# Patient Record
Sex: FEMALE | Race: WHITE | NOT HISPANIC OR LATINO | Employment: OTHER | URBAN - METROPOLITAN AREA
[De-identification: names, ages, dates, MRNs, and addresses within clinical notes are randomized per-mention and may not be internally consistent; named-entity substitution may affect disease eponyms.]

---

## 2017-01-04 ENCOUNTER — TELEPHONE (OUTPATIENT)
Dept: UROLOGY | Facility: CLINIC | Age: 82
End: 2017-01-04

## 2017-01-04 NOTE — TELEPHONE ENCOUNTER
I left a message on the pt's voicemail to please call us back so that an appointment can be scheduled. This appointment would be a f/u from [facility] and in response to the referral that was sent by Dr Christian on 12/30/16. I also called [facility] and requested the records from the consult on 12/19/16.The records are in the folder.

## 2017-12-05 ENCOUNTER — OFFICE VISIT (OUTPATIENT)
Dept: SURGERY | Facility: CLINIC | Age: 82
End: 2017-12-05
Payer: MEDICARE

## 2017-12-05 VITALS
WEIGHT: 229.94 LBS | DIASTOLIC BLOOD PRESSURE: 88 MMHG | SYSTOLIC BLOOD PRESSURE: 155 MMHG | HEIGHT: 61 IN | BODY MASS INDEX: 43.41 KG/M2

## 2017-12-05 DIAGNOSIS — K57.20 DIVERTICULITIS OF LARGE INTESTINE WITH ABSCESS WITHOUT BLEEDING: Primary | ICD-10-CM

## 2017-12-05 PROCEDURE — 99213 OFFICE O/P EST LOW 20 MIN: CPT | Mod: ,,, | Performed by: SURGERY

## 2017-12-05 RX ORDER — GLUCOSAM/CHON-MSM1/C/MANG/BOSW 500-416.6
1 TABLET ORAL 2 TIMES DAILY
COMMUNITY
Start: 2017-10-24

## 2017-12-05 RX ORDER — SIMVASTATIN 20 MG/1
20 TABLET, FILM COATED ORAL
COMMUNITY
End: 2017-12-05 | Stop reason: SDUPTHER

## 2017-12-05 RX ORDER — ALPRAZOLAM 0.25 MG/1
0.25 TABLET ORAL DAILY
Status: ON HOLD | COMMUNITY
Start: 2017-02-10 | End: 2023-11-20 | Stop reason: HOSPADM

## 2017-12-05 RX ORDER — TALC
400 POWDER (GRAM) TOPICAL DAILY
COMMUNITY

## 2017-12-05 RX ORDER — DIGOXIN 250 MCG
125 TABLET ORAL DAILY
COMMUNITY
Start: 2017-11-11 | End: 2020-09-04

## 2017-12-05 RX ORDER — TRAZODONE HYDROCHLORIDE 100 MG/1
50 TABLET ORAL NIGHTLY
Status: ON HOLD | COMMUNITY
End: 2023-11-20 | Stop reason: HOSPADM

## 2017-12-05 RX ORDER — INSULIN ASPART 100 [IU]/ML
20 INJECTION, SUSPENSION SUBCUTANEOUS
COMMUNITY
End: 2017-12-05 | Stop reason: SDUPTHER

## 2017-12-05 RX ORDER — HYDROCODONE BITARTRATE AND ACETAMINOPHEN 5; 325 MG/1; MG/1
TABLET ORAL
COMMUNITY
Start: 2017-11-20 | End: 2018-03-23

## 2017-12-05 RX ORDER — BLOOD-GLUCOSE METER
EACH MISCELLANEOUS
COMMUNITY
Start: 2017-09-05

## 2017-12-05 RX ORDER — PANTOPRAZOLE SODIUM 20 MG/1
20 TABLET, DELAYED RELEASE ORAL DAILY
COMMUNITY

## 2017-12-05 RX ORDER — LEVOTHYROXINE SODIUM 175 UG/1
175 TABLET ORAL
COMMUNITY
End: 2017-12-05 | Stop reason: SDUPTHER

## 2017-12-05 RX ORDER — LANCETS 28 GAUGE
1 EACH MISCELLANEOUS 2 TIMES DAILY
COMMUNITY
Start: 2017-09-27

## 2017-12-05 RX ORDER — ONDANSETRON HYDROCHLORIDE 8 MG/1
TABLET, FILM COATED ORAL
COMMUNITY
End: 2017-12-05 | Stop reason: SDUPTHER

## 2017-12-05 RX ORDER — ALBUTEROL SULFATE 90 UG/1
AEROSOL, METERED RESPIRATORY (INHALATION)
COMMUNITY
End: 2017-12-05 | Stop reason: SDUPTHER

## 2017-12-05 RX ORDER — FUROSEMIDE 40 MG/1
40 TABLET ORAL
COMMUNITY
Start: 2017-10-31 | End: 2017-12-05 | Stop reason: SDUPTHER

## 2017-12-05 RX ORDER — ASPIRIN 81 MG/1
81 TABLET ORAL
COMMUNITY
End: 2017-12-05 | Stop reason: SDUPTHER

## 2017-12-05 RX ORDER — METOPROLOL SUCCINATE 100 MG/1
100 TABLET, EXTENDED RELEASE ORAL
COMMUNITY
End: 2017-12-05 | Stop reason: SDUPTHER

## 2017-12-05 RX ORDER — INSULIN ASPART 100 [IU]/ML
INJECTION, SUSPENSION SUBCUTANEOUS 2 TIMES DAILY
COMMUNITY
Start: 2017-10-21

## 2017-12-05 RX ORDER — ERTAPENEM SODIUM 1 G/1
INJECTION, POWDER, LYOPHILIZED, FOR SOLUTION INTRAMUSCULAR; INTRAVENOUS
COMMUNITY
Start: 2017-11-27 | End: 2018-03-19

## 2017-12-05 RX ORDER — MULTIVIT WITH IRON,MINERALS
3 TABLET ORAL 2 TIMES DAILY
COMMUNITY

## 2017-12-05 RX ORDER — BENAZEPRIL HYDROCHLORIDE 10 MG/1
10 TABLET ORAL
COMMUNITY
End: 2017-12-05 | Stop reason: SDUPTHER

## 2017-12-05 RX ORDER — MONTELUKAST SODIUM 5 MG/1
10 TABLET, CHEWABLE ORAL
COMMUNITY
End: 2018-03-19

## 2017-12-05 RX ORDER — ESCITALOPRAM OXALATE 20 MG/1
20 TABLET ORAL DAILY
Status: ON HOLD | COMMUNITY
End: 2018-02-23

## 2017-12-05 RX ORDER — IPRATROPIUM BROMIDE AND ALBUTEROL SULFATE 2.5; .5 MG/3ML; MG/3ML
SOLUTION RESPIRATORY (INHALATION)
COMMUNITY
End: 2017-12-05 | Stop reason: SDUPTHER

## 2017-12-05 RX ORDER — LEVOTHYROXINE SODIUM 125 UG/1
125 TABLET ORAL
COMMUNITY
Start: 2017-10-12 | End: 2020-12-17

## 2017-12-05 NOTE — PROGRESS NOTES
Subjective:       Patient ID: Nu Lee is a 82 y.o. female.    Chief Complaint: Other (Follow Up Diverticulitis)      HPI:  Patient presents for follow up.  She was admitted with acute sigmoid diverticulitis with 2cm abscess.  She was started on IV antibiotics.  Her symptoms resolved after several days in the hospital.  She was discharged on home IV antibiotics via PICC line.  She is feeling well.  She has no complaints this morning.  She has been afebrile.  She is tolerating diet.  Her last colonoscopy was three years ago.        Past Medical History:   Diagnosis Date    Arthritis     Coronary artery disease     Diverticulitis     GERD (gastroesophageal reflux disease)     Hyperlipidemia     Hypertension      Past Surgical History:   Procedure Laterality Date    CARDIAC PACEMAKER PLACEMENT  2007    CHOLECYSTECTOMY      CORONARY ARTERY BYPASS GRAFT      HYSTERECTOMY      TOTAL KNEE ARTHROPLASTY Right     TOTAL KNEE ARTHROPLASTY Left      Review of patient's allergies indicates:  No Known Allergies  Medication List with Changes/Refills   Current Medications    ALBUTEROL (VENTOLIN HFA) 90 MCG/ACTUATION INHALER    Inhale into the lungs.    ALPRAZOLAM (XANAX) 0.25 MG TABLET        APIXABAN 2.5 MG TAB    Take by mouth.    ASPIRIN (ECOTRIN) 81 MG EC TABLET    Take 81 mg by mouth once daily.    BENAZEPRIL (LOTENSIN) 10 MG TABLET    Take 10 mg by mouth.    CONTOUR TEST STRIPS STRP    USE TO TEST BLOOD SUGAR BID    CYANOCOBALAMIN 500 MCG TABLET    Take 1 tablet by mouth.    DIGOXIN (LANOXIN) 250 MCG TABLET        DOCUSATE SODIUM (COLACE) 50 MG CAPSULE    Take by mouth 2 (two) times daily.    ESCITALOPRAM OXALATE (LEXAPRO) 20 MG TABLET    Take 20 mg by mouth.    FUROSEMIDE (LASIX) 40 MG TABLET    Take 40 mg by mouth.    HYDROCODONE-ACETAMINOPHEN 5-325MG (NORCO) 5-325 MG PER TABLET        INVANZ 1 GRAM INJECTION        IPRATROPIUM-ALBUTEROL (COMBIVENT)  MCG/ACTUATION INHALER    Inhale into  "the lungs.    LEVOTHYROXINE (SYNTHROID) 125 MCG TABLET        MAGNESIUM OXIDE (MAG-OX) 250 MG TAB    Take 400 mg by mouth.    METOPROLOL SUCCINATE (TOPROL-XL) 100 MG 24 HR TABLET    Take 100 mg by mouth.    MONTELUKAST (SINGULAIR) 5 MG CHEWABLE TABLET    Take 10 mg by mouth.    NOVOLOG MIX 70-30 FLEXPEN 100 UNIT/ML (70-30) INPN PEN        ONDANSETRON (ZOFRAN) 8 MG TABLET    Take by mouth every 8 (eight) hours as needed for Nausea.    PANTOPRAZOLE (PROTONIX) 20 MG TABLET    Take 20 mg by mouth.    POTASSIUM GLUCONATE 2.5 MEQ TAB    Take 1 tablet by mouth.    SIMVASTATIN (ZOCOR) 20 MG TABLET    Take 20 mg by mouth.    TRAZODONE (DESYREL) 100 MG TABLET    Take 50 mg by mouth.    TRUE METRIX GLUCOSE METER MISC        TRUEPLUS LANCETS 28 GAUGE MISC        TRUEPLUS PEN NEEDLE 31 GAUGE X 1/4" NDLE       Discontinued Medications    ALBUTEROL 90 MCG/ACTUATION INHALER    Inhale into the lungs.    ALBUTEROL-IPRATROPIUM 2.5MG-0.5MG/3ML (DUO-NEB) 0.5 MG-3 MG(2.5 MG BASE)/3 ML NEBULIZER SOLUTION    Inhale into the lungs.    ASPIRIN (ECOTRIN) 81 MG EC TABLET    Take 81 mg by mouth.    BENAZEPRIL (LOTENSIN) 10 MG TABLET    Take 10 mg by mouth.    BLOOD SUGAR DIAGNOSTIC (BLOOD GLUCOSE TEST) STRP    USE TO TEST BLOOD SUGAR BID    DOCUSATE SODIUM (COLACE) 50 MG CAPSULE    Take by mouth.    FUROSEMIDE (LASIX) 40 MG TABLET    Take 40 mg by mouth.    INSULIN ASP PRT-INSULIN ASPART, NOVOLOG 70/30, (NOVOLOG MIX 70-30) 100 UNIT/ML (70-30) SOLN    Inject 20 Units into the skin.    LEVOTHYROXINE (SYNTHROID, LEVOTHROID) 175 MCG TABLET    Take 175 mcg by mouth once daily.    LEVOTHYROXINE (SYNTHROID, LEVOTHROID) 175 MCG TABLET    Take 175 mcg by mouth.    MAGNESIUM OXIDE (MAG-OX) 250 MG TAB    Take by mouth.    MELOXICAM (MOBIC) 15 MG TABLET    Take 15 mg by mouth once daily.    METOPROLOL SUCCINATE (TOPROL-XL) 100 MG 24 HR TABLET    Take 100 mg by mouth.    ONDANSETRON (ZOFRAN) 8 MG TABLET    Take by mouth.    PANTOPRAZOLE (PROTONIX) 20 MG " TABLET    Take 20 mg by mouth once daily.    SIMVASTATIN (ZOCOR) 20 MG TABLET    Take 20 mg by mouth.     History reviewed. No pertinent family history.  Social History     Social History    Marital status:      Spouse name: N/A    Number of children: N/A    Years of education: N/A     Social History Main Topics    Smoking status: Former Smoker    Smokeless tobacco: None    Alcohol use No    Drug use: No    Sexual activity: Not Asked     Other Topics Concern    None     Social History Narrative    None         Review of Systems   Constitutional: Negative for appetite change, chills, fever and unexpected weight change.   HENT: Negative for hearing loss, rhinorrhea, sore throat and voice change.    Eyes: Negative for photophobia and visual disturbance.   Respiratory: Negative for cough, choking and shortness of breath.    Cardiovascular: Negative for chest pain, palpitations and leg swelling.   Gastrointestinal: Negative for abdominal pain, blood in stool, constipation, diarrhea, nausea and vomiting.   Endocrine: Negative for cold intolerance, heat intolerance and polyphagia.   Genitourinary: Negative for dysuria.   Musculoskeletal: Negative for arthralgias and back pain.   Skin: Negative for color change.   Neurological: Negative for dizziness, seizures, syncope and headaches.   Hematological: Negative for adenopathy. Does not bruise/bleed easily.       Objective:      Physical Exam   Constitutional: She is oriented to person, place, and time. She appears well-developed and well-nourished.  Non-toxic appearance. No distress.   HENT:   Head: Normocephalic and atraumatic. Head is without abrasion and without laceration.   Right Ear: External ear normal.   Left Ear: External ear normal.   Nose: Nose normal.   Mouth/Throat: Oropharynx is clear and moist.   Eyes: EOM are normal. Pupils are equal, round, and reactive to light.   Neck: Trachea normal. Neck supple. No tracheal deviation and normal range of  motion present.   Cardiovascular: Normal rate and regular rhythm.    Pulmonary/Chest: Effort normal. No accessory muscle usage. No tachypnea. No respiratory distress.   Abdominal: Soft. Normal appearance and bowel sounds are normal. She exhibits no distension and no mass. There is no tenderness.   Lymphadenopathy:        Right: No inguinal adenopathy present.        Left: No inguinal adenopathy present.   Neurological: She is alert and oriented to person, place, and time. Coordination and gait normal.   Skin: Skin is warm and intact.   Psychiatric: She has a normal mood and affect. Her speech is normal and behavior is normal.       Assessment/Plan:   Nu was seen today for other.    Diagnoses and all orders for this visit:    Diverticulitis of large intestine with abscess without bleeding  Comments:  She is pain free and afebrile.  She has completed antibiotics.  Will order CT abd and pelvis for follow up evaluation.    Orders:  -     CT Abdomen Pelvis With Contrast; Future      She will return to the office for results and to have PICC removed if no further IV medication is needed.

## 2017-12-14 ENCOUNTER — TELEPHONE (OUTPATIENT)
Dept: SURGERY | Facility: CLINIC | Age: 82
End: 2017-12-14

## 2017-12-14 NOTE — TELEPHONE ENCOUNTER
Per , CT OK    PT Notified.   Ailyn: Home Health nurse notified ok to D/C PICC ([phone])  Left Message w/ [employer] (Pia) ([phone])

## 2018-02-20 ENCOUNTER — DOCUMENTATION ONLY (OUTPATIENT)
Dept: CARDIOLOGY | Facility: CLINIC | Age: 83
End: 2018-02-20

## 2018-02-20 ENCOUNTER — HOSPITAL ENCOUNTER (INPATIENT)
Facility: HOSPITAL | Age: 83
LOS: 3 days | Discharge: HOME | DRG: 287 | End: 2018-02-23
Attending: HOSPITALIST | Admitting: HOSPITALIST
Payer: MEDICARE

## 2018-02-20 DIAGNOSIS — I10 ESSENTIAL HYPERTENSION: ICD-10-CM

## 2018-02-20 DIAGNOSIS — Z95.2 H/O PROSTHETIC AORTIC VALVE REPLACEMENT: ICD-10-CM

## 2018-02-20 DIAGNOSIS — E11.22 TYPE 2 DIABETES MELLITUS WITH STAGE 2 CHRONIC KIDNEY DISEASE, WITHOUT LONG-TERM CURRENT USE OF INSULIN: ICD-10-CM

## 2018-02-20 DIAGNOSIS — I48.21 PERMANENT ATRIAL FIBRILLATION: Primary | ICD-10-CM

## 2018-02-20 DIAGNOSIS — I35.0 AORTIC VALVE STENOSIS, ETIOLOGY OF CARDIAC VALVE DISEASE UNSPECIFIED: ICD-10-CM

## 2018-02-20 DIAGNOSIS — K21.9 GASTROESOPHAGEAL REFLUX DISEASE, ESOPHAGITIS PRESENCE NOT SPECIFIED: ICD-10-CM

## 2018-02-20 DIAGNOSIS — I25.10 CAD (CORONARY ARTERY DISEASE): ICD-10-CM

## 2018-02-20 DIAGNOSIS — I48.91 ATRIAL FIBRILLATION, UNSPECIFIED TYPE: ICD-10-CM

## 2018-02-20 DIAGNOSIS — I35.0 NONRHEUMATIC AORTIC VALVE STENOSIS: ICD-10-CM

## 2018-02-20 DIAGNOSIS — E78.49 OTHER HYPERLIPIDEMIA: ICD-10-CM

## 2018-02-20 DIAGNOSIS — N18.2 TYPE 2 DIABETES MELLITUS WITH STAGE 2 CHRONIC KIDNEY DISEASE, WITHOUT LONG-TERM CURRENT USE OF INSULIN: ICD-10-CM

## 2018-02-20 DIAGNOSIS — I71.21 ASCENDING AORTIC ANEURYSM: Chronic | ICD-10-CM

## 2018-02-20 DIAGNOSIS — I35.0 AORTIC STENOSIS: ICD-10-CM

## 2018-02-20 DIAGNOSIS — I10 ESSENTIAL (PRIMARY) HYPERTENSION: ICD-10-CM

## 2018-02-20 LAB
ALBUMIN SERPL BCP-MCNC: 3.2 G/DL
ALP SERPL-CCNC: 65 U/L
ALT SERPL W/O P-5'-P-CCNC: 23 U/L
ANION GAP SERPL CALC-SCNC: 10 MMOL/L
APTT BLDCRRT: 25.5 SEC
AST SERPL-CCNC: 41 U/L
BASOPHILS # BLD AUTO: 0.08 K/UL
BASOPHILS NFR BLD: 1.1 %
BILIRUB SERPL-MCNC: 0.8 MG/DL
BUN SERPL-MCNC: 15 MG/DL
CALCIUM SERPL-MCNC: 9.5 MG/DL
CHLORIDE SERPL-SCNC: 103 MMOL/L
CO2 SERPL-SCNC: 26 MMOL/L
CREAT SERPL-MCNC: 1 MG/DL
DIFFERENTIAL METHOD: ABNORMAL
EOSINOPHIL # BLD AUTO: 0.2 K/UL
EOSINOPHIL NFR BLD: 2.6 %
ERYTHROCYTE [DISTWIDTH] IN BLOOD BY AUTOMATED COUNT: 15.7 %
EST. GFR  (AFRICAN AMERICAN): >60 ML/MIN/1.73 M^2
EST. GFR  (NON AFRICAN AMERICAN): 53 ML/MIN/1.73 M^2
GLUCOSE SERPL-MCNC: 111 MG/DL
HCT VFR BLD AUTO: 32 %
HGB BLD-MCNC: 10.9 G/DL
IMM GRANULOCYTES # BLD AUTO: 0.04 K/UL
IMM GRANULOCYTES NFR BLD AUTO: 0.6 %
INR PPP: 1
LYMPHOCYTES # BLD AUTO: 2 K/UL
LYMPHOCYTES NFR BLD: 28.1 %
MCH RBC QN AUTO: 29.9 PG
MCHC RBC AUTO-ENTMCNC: 34.1 G/DL
MCV RBC AUTO: 88 FL
MONOCYTES # BLD AUTO: 0.6 K/UL
MONOCYTES NFR BLD: 7.8 %
NEUTROPHILS # BLD AUTO: 4.3 K/UL
NEUTROPHILS NFR BLD: 59.8 %
NRBC BLD-RTO: 0 /100 WBC
PLATELET # BLD AUTO: 190 K/UL
PMV BLD AUTO: 12 FL
POCT GLUCOSE: 137 MG/DL (ref 70–110)
POTASSIUM SERPL-SCNC: 4 MMOL/L
PROT SERPL-MCNC: 6.7 G/DL
PROTHROMBIN TIME: 10.7 SEC
RBC # BLD AUTO: 3.64 M/UL
SODIUM SERPL-SCNC: 139 MMOL/L
WBC # BLD AUTO: 7.22 K/UL

## 2018-02-20 PROCEDURE — 80053 COMPREHEN METABOLIC PANEL: CPT

## 2018-02-20 PROCEDURE — 93010 ELECTROCARDIOGRAM REPORT: CPT | Mod: ,,, | Performed by: INTERNAL MEDICINE

## 2018-02-20 PROCEDURE — 94640 AIRWAY INHALATION TREATMENT: CPT

## 2018-02-20 PROCEDURE — 99223 1ST HOSP IP/OBS HIGH 75: CPT | Mod: ,,, | Performed by: INTERNAL MEDICINE

## 2018-02-20 PROCEDURE — 93005 ELECTROCARDIOGRAM TRACING: CPT

## 2018-02-20 PROCEDURE — B41F1ZZ FLUOROSCOPY OF RIGHT LOWER EXTREMITY ARTERIES USING LOW OSMOLAR CONTRAST: ICD-10-PCS | Performed by: INTERNAL MEDICINE

## 2018-02-20 PROCEDURE — 94761 N-INVAS EAR/PLS OXIMETRY MLT: CPT

## 2018-02-20 PROCEDURE — 85610 PROTHROMBIN TIME: CPT

## 2018-02-20 PROCEDURE — B2111ZZ FLUOROSCOPY OF MULTIPLE CORONARY ARTERIES USING LOW OSMOLAR CONTRAST: ICD-10-PCS | Performed by: INTERNAL MEDICINE

## 2018-02-20 PROCEDURE — 85025 COMPLETE CBC W/AUTO DIFF WBC: CPT

## 2018-02-20 PROCEDURE — 85730 THROMBOPLASTIN TIME PARTIAL: CPT

## 2018-02-20 PROCEDURE — 25000242 PHARM REV CODE 250 ALT 637 W/ HCPCS: Performed by: INTERNAL MEDICINE

## 2018-02-20 PROCEDURE — 25000003 PHARM REV CODE 250: Performed by: INTERNAL MEDICINE

## 2018-02-20 PROCEDURE — B2121ZZ FLUOROSCOPY OF SINGLE CORONARY ARTERY BYPASS GRAFT USING LOW OSMOLAR CONTRAST: ICD-10-PCS | Performed by: INTERNAL MEDICINE

## 2018-02-20 PROCEDURE — 20600001 HC STEP DOWN PRIVATE ROOM

## 2018-02-20 PROCEDURE — 36415 COLL VENOUS BLD VENIPUNCTURE: CPT

## 2018-02-20 RX ORDER — ALPRAZOLAM 0.25 MG/1
0.25 TABLET ORAL 2 TIMES DAILY PRN
Status: DISCONTINUED | OUTPATIENT
Start: 2018-02-20 | End: 2018-02-23 | Stop reason: HOSPADM

## 2018-02-20 RX ORDER — TRAZODONE HYDROCHLORIDE 50 MG/1
50 TABLET ORAL NIGHTLY PRN
Status: DISCONTINUED | OUTPATIENT
Start: 2018-02-20 | End: 2018-02-23 | Stop reason: HOSPADM

## 2018-02-20 RX ORDER — INSULIN ASPART 100 [IU]/ML
6 INJECTION, SOLUTION INTRAVENOUS; SUBCUTANEOUS
Status: DISCONTINUED | OUTPATIENT
Start: 2018-02-21 | End: 2018-02-23 | Stop reason: HOSPADM

## 2018-02-20 RX ORDER — ASPIRIN 81 MG/1
81 TABLET ORAL DAILY
Status: DISCONTINUED | OUTPATIENT
Start: 2018-02-21 | End: 2018-02-23 | Stop reason: HOSPADM

## 2018-02-20 RX ORDER — IPRATROPIUM BROMIDE AND ALBUTEROL SULFATE 2.5; .5 MG/3ML; MG/3ML
3 SOLUTION RESPIRATORY (INHALATION)
Status: DISCONTINUED | OUTPATIENT
Start: 2018-02-20 | End: 2018-02-22

## 2018-02-20 RX ORDER — ESCITALOPRAM OXALATE 5 MG/1
10 TABLET ORAL DAILY
Status: DISCONTINUED | OUTPATIENT
Start: 2018-02-21 | End: 2018-02-23 | Stop reason: HOSPADM

## 2018-02-20 RX ORDER — MONTELUKAST SODIUM 5 MG/1
5 TABLET, CHEWABLE ORAL NIGHTLY
Status: DISCONTINUED | OUTPATIENT
Start: 2018-02-20 | End: 2018-02-23 | Stop reason: HOSPADM

## 2018-02-20 RX ORDER — GLUCAGON 1 MG
1 KIT INJECTION
Status: DISCONTINUED | OUTPATIENT
Start: 2018-02-20 | End: 2018-02-23 | Stop reason: HOSPADM

## 2018-02-20 RX ORDER — ONDANSETRON 8 MG/1
8 TABLET, ORALLY DISINTEGRATING ORAL EVERY 8 HOURS PRN
Status: DISCONTINUED | OUTPATIENT
Start: 2018-02-20 | End: 2018-02-23 | Stop reason: HOSPADM

## 2018-02-20 RX ORDER — HYDROCODONE BITARTRATE AND ACETAMINOPHEN 5; 325 MG/1; MG/1
1 TABLET ORAL EVERY 6 HOURS PRN
Status: DISCONTINUED | OUTPATIENT
Start: 2018-02-20 | End: 2018-02-23 | Stop reason: HOSPADM

## 2018-02-20 RX ORDER — CYANOCOBALAMIN (VITAMIN B-12) 250 MCG
500 TABLET ORAL DAILY
Status: DISCONTINUED | OUTPATIENT
Start: 2018-02-21 | End: 2018-02-23 | Stop reason: HOSPADM

## 2018-02-20 RX ORDER — PANTOPRAZOLE SODIUM 40 MG/1
40 TABLET, DELAYED RELEASE ORAL DAILY
Status: DISCONTINUED | OUTPATIENT
Start: 2018-02-21 | End: 2018-02-23 | Stop reason: HOSPADM

## 2018-02-20 RX ORDER — FUROSEMIDE 40 MG/1
40 TABLET ORAL 2 TIMES DAILY
Status: DISCONTINUED | OUTPATIENT
Start: 2018-02-21 | End: 2018-02-23 | Stop reason: HOSPADM

## 2018-02-20 RX ORDER — POTASSIUM CHLORIDE 20 MEQ/15ML
40 SOLUTION ORAL
Status: DISCONTINUED | OUTPATIENT
Start: 2018-02-20 | End: 2018-02-23 | Stop reason: HOSPADM

## 2018-02-20 RX ORDER — SIMVASTATIN 20 MG/1
20 TABLET, FILM COATED ORAL NIGHTLY
Status: DISCONTINUED | OUTPATIENT
Start: 2018-02-20 | End: 2018-02-22

## 2018-02-20 RX ORDER — DIGOXIN 250 MCG
0.25 TABLET ORAL DAILY
Status: DISCONTINUED | OUTPATIENT
Start: 2018-02-21 | End: 2018-02-23 | Stop reason: HOSPADM

## 2018-02-20 RX ORDER — INSULIN ASPART 100 [IU]/ML
1-10 INJECTION, SOLUTION INTRAVENOUS; SUBCUTANEOUS
Status: DISCONTINUED | OUTPATIENT
Start: 2018-02-20 | End: 2018-02-23 | Stop reason: HOSPADM

## 2018-02-20 RX ORDER — IBUPROFEN 200 MG
24 TABLET ORAL
Status: DISCONTINUED | OUTPATIENT
Start: 2018-02-20 | End: 2018-02-23 | Stop reason: HOSPADM

## 2018-02-20 RX ORDER — IBUPROFEN 200 MG
16 TABLET ORAL
Status: DISCONTINUED | OUTPATIENT
Start: 2018-02-20 | End: 2018-02-23 | Stop reason: HOSPADM

## 2018-02-20 RX ORDER — METOPROLOL SUCCINATE 100 MG/1
100 TABLET, EXTENDED RELEASE ORAL DAILY
Status: DISCONTINUED | OUTPATIENT
Start: 2018-02-21 | End: 2018-02-23 | Stop reason: HOSPADM

## 2018-02-20 RX ORDER — BENAZEPRIL HYDROCHLORIDE 10 MG/1
10 TABLET ORAL DAILY
Status: DISCONTINUED | OUTPATIENT
Start: 2018-02-21 | End: 2018-02-23 | Stop reason: HOSPADM

## 2018-02-20 RX ORDER — SODIUM CHLORIDE 0.9 % (FLUSH) 0.9 %
5 SYRINGE (ML) INJECTION
Status: DISCONTINUED | OUTPATIENT
Start: 2018-02-20 | End: 2018-02-23 | Stop reason: HOSPADM

## 2018-02-20 RX ADMIN — DOCUSATE SODIUM 50 MG: 50 CAPSULE, LIQUID FILLED ORAL at 09:02

## 2018-02-20 RX ADMIN — APIXABAN 2.5 MG: 2.5 TABLET, FILM COATED ORAL at 09:02

## 2018-02-20 RX ADMIN — HYDROCODONE BITARTRATE AND ACETAMINOPHEN 1 TABLET: 5; 325 TABLET ORAL at 09:02

## 2018-02-20 RX ADMIN — SIMVASTATIN 20 MG: 20 TABLET, FILM COATED ORAL at 09:02

## 2018-02-20 RX ADMIN — MAGNESIUM OXIDE TAB 400 MG (241.3 MG ELEMENTAL MG) 400 MG: 400 (241.3 MG) TAB at 09:02

## 2018-02-20 RX ADMIN — IPRATROPIUM BROMIDE AND ALBUTEROL SULFATE 3 ML: .5; 3 SOLUTION RESPIRATORY (INHALATION) at 09:02

## 2018-02-20 RX ADMIN — MONTELUKAST SODIUM 5 MG: 5 TABLET, CHEWABLE ORAL at 09:02

## 2018-02-20 NOTE — PLAN OF CARE
Outside Transfer Acceptance Note    Transferring Physician or Mid Level Provider/Speciality: Dr De La Fuente - hospitalist    Accepting Physician: Denise Walker     Date of Acceptance: 02/20/2018     Code Status: Full    Transferring Facility/Hospital: [facility]    Reason for Transfer to [facility]: TAVR eval    Report from Transferring Physician or Mid-Level provider/Hospital course: 82 F with a PMH of HTN, HLD DM2, CAD s/p CABG, Aortic stenosis s/p prosthetic valve replacement years ago, GERD, and arthritis.  She presented with chest pain.  No changes on EKG. Troponins mildly elevated and flat (at her baseline). Echo showed Aortic valve area 0.61cm.      Dr Romero with Cardiology would like the pt to be transferred for a TAVR evaluation    To do list upon patient arrival: consult interventional cardiology    Please call extension [phone] upon patient arrival to floor for Hospital Medicine admit team assignment and for additional admit orders. If patient is coming from another [facility] facility please also call [phone] to inform the admit team/office that patient has arrived from the [facility] facility to the floor so patient can be evaluated.

## 2018-02-20 NOTE — PROGRESS NOTES
Patient referred to Dr. Romero for TAVR evaluation.  Currently inpatient at [facility].  Transfer center notified and will initiate transfer to hospital medicine service.

## 2018-02-21 PROBLEM — Z95.0 PACEMAKER: Status: ACTIVE | Noted: 2018-02-21

## 2018-02-21 PROBLEM — E11.29 TYPE 2 DIABETES MELLITUS WITH RENAL COMPLICATION: Status: ACTIVE | Noted: 2018-02-21

## 2018-02-21 PROBLEM — Z95.2 H/O PROSTHETIC AORTIC VALVE REPLACEMENT: Status: ACTIVE | Noted: 2018-02-21

## 2018-02-21 PROBLEM — Z95.1 HX OF CABG: Status: ACTIVE | Noted: 2018-02-21

## 2018-02-21 LAB
AORTIC VALVE REGURGITATION: NORMAL
BNP SERPL-MCNC: 284 PG/ML
DIGOXIN SERPL-MCNC: 0.7 NG/ML
ESTIMATED PA SYSTOLIC PRESSURE: 21.15
MITRAL VALVE REGURGITATION: NORMAL
POCT GLUCOSE: 144 MG/DL (ref 70–110)
POCT GLUCOSE: 177 MG/DL (ref 70–110)
POCT GLUCOSE: 188 MG/DL (ref 70–110)
POCT GLUCOSE: 196 MG/DL (ref 70–110)
RETIRED EF AND QEF - SEE NOTES: 55 (ref 55–65)
TRICUSPID VALVE REGURGITATION: NORMAL
TROPONIN I SERPL DL<=0.01 NG/ML-MCNC: 0.04 NG/ML

## 2018-02-21 PROCEDURE — 80162 ASSAY OF DIGOXIN TOTAL: CPT

## 2018-02-21 PROCEDURE — 25000003 PHARM REV CODE 250: Performed by: INTERNAL MEDICINE

## 2018-02-21 PROCEDURE — 36415 COLL VENOUS BLD VENIPUNCTURE: CPT

## 2018-02-21 PROCEDURE — 93306 TTE W/DOPPLER COMPLETE: CPT

## 2018-02-21 PROCEDURE — 25000003 PHARM REV CODE 250: Performed by: NURSE PRACTITIONER

## 2018-02-21 PROCEDURE — 99222 1ST HOSP IP/OBS MODERATE 55: CPT | Mod: AI,,, | Performed by: NURSE PRACTITIONER

## 2018-02-21 PROCEDURE — 93306 TTE W/DOPPLER COMPLETE: CPT | Mod: 26,,, | Performed by: INTERNAL MEDICINE

## 2018-02-21 PROCEDURE — 25000242 PHARM REV CODE 250 ALT 637 W/ HCPCS: Performed by: INTERNAL MEDICINE

## 2018-02-21 PROCEDURE — 63600175 PHARM REV CODE 636 W HCPCS: Performed by: INTERNAL MEDICINE

## 2018-02-21 PROCEDURE — 99223 1ST HOSP IP/OBS HIGH 75: CPT | Mod: ,,, | Performed by: NURSE PRACTITIONER

## 2018-02-21 PROCEDURE — 83880 ASSAY OF NATRIURETIC PEPTIDE: CPT

## 2018-02-21 PROCEDURE — 27000221 HC OXYGEN, UP TO 24 HOURS

## 2018-02-21 PROCEDURE — 99223 1ST HOSP IP/OBS HIGH 75: CPT | Mod: ,,, | Performed by: INTERNAL MEDICINE

## 2018-02-21 PROCEDURE — 84484 ASSAY OF TROPONIN QUANT: CPT

## 2018-02-21 PROCEDURE — 20600001 HC STEP DOWN PRIVATE ROOM

## 2018-02-21 PROCEDURE — 94640 AIRWAY INHALATION TREATMENT: CPT

## 2018-02-21 PROCEDURE — 94761 N-INVAS EAR/PLS OXIMETRY MLT: CPT

## 2018-02-21 RX ORDER — BENZONATATE 100 MG/1
100 CAPSULE ORAL 3 TIMES DAILY PRN
Status: DISCONTINUED | OUTPATIENT
Start: 2018-02-21 | End: 2018-02-23 | Stop reason: HOSPADM

## 2018-02-21 RX ORDER — CLOPIDOGREL 300 MG/1
300 TABLET, FILM COATED ORAL ONCE
Status: COMPLETED | OUTPATIENT
Start: 2018-02-21 | End: 2018-02-21

## 2018-02-21 RX ORDER — CLOPIDOGREL BISULFATE 75 MG/1
75 TABLET ORAL DAILY
Status: DISCONTINUED | OUTPATIENT
Start: 2018-02-22 | End: 2018-02-22

## 2018-02-21 RX ORDER — CLOPIDOGREL BISULFATE 75 MG/1
75 TABLET ORAL DAILY
Status: DISCONTINUED | OUTPATIENT
Start: 2018-02-22 | End: 2018-02-21

## 2018-02-21 RX ADMIN — FUROSEMIDE 40 MG: 40 TABLET ORAL at 06:02

## 2018-02-21 RX ADMIN — INSULIN ASPART 2 UNITS: 100 INJECTION, SOLUTION INTRAVENOUS; SUBCUTANEOUS at 10:02

## 2018-02-21 RX ADMIN — DIGOXIN 0.25 MG: 250 TABLET ORAL at 10:02

## 2018-02-21 RX ADMIN — IPRATROPIUM BROMIDE AND ALBUTEROL SULFATE 3 ML: .5; 3 SOLUTION RESPIRATORY (INHALATION) at 03:02

## 2018-02-21 RX ADMIN — INSULIN DETEMIR 17 UNITS: 100 INJECTION, SOLUTION SUBCUTANEOUS at 10:02

## 2018-02-21 RX ADMIN — IPRATROPIUM BROMIDE AND ALBUTEROL SULFATE 3 ML: .5; 3 SOLUTION RESPIRATORY (INHALATION) at 08:02

## 2018-02-21 RX ADMIN — PANTOPRAZOLE SODIUM 40 MG: 40 TABLET, DELAYED RELEASE ORAL at 10:02

## 2018-02-21 RX ADMIN — APIXABAN 2.5 MG: 2.5 TABLET, FILM COATED ORAL at 10:02

## 2018-02-21 RX ADMIN — MAGNESIUM OXIDE TAB 400 MG (241.3 MG ELEMENTAL MG) 400 MG: 400 (241.3 MG) TAB at 09:02

## 2018-02-21 RX ADMIN — BENAZEPRIL HYDROCHLORIDE 10 MG: 10 TABLET, FILM COATED ORAL at 10:02

## 2018-02-21 RX ADMIN — INSULIN ASPART 6 UNITS: 100 INJECTION, SOLUTION INTRAVENOUS; SUBCUTANEOUS at 06:02

## 2018-02-21 RX ADMIN — DOCUSATE SODIUM 50 MG: 50 CAPSULE, LIQUID FILLED ORAL at 09:02

## 2018-02-21 RX ADMIN — METOPROLOL SUCCINATE 100 MG: 100 TABLET, EXTENDED RELEASE ORAL at 10:02

## 2018-02-21 RX ADMIN — MAGNESIUM OXIDE TAB 400 MG (241.3 MG ELEMENTAL MG) 400 MG: 400 (241.3 MG) TAB at 10:02

## 2018-02-21 RX ADMIN — CLOPIDOGREL BISULFATE 300 MG: 300 TABLET, FILM COATED ORAL at 06:02

## 2018-02-21 RX ADMIN — ASPIRIN 81 MG: 81 TABLET, COATED ORAL at 10:02

## 2018-02-21 RX ADMIN — DOCUSATE SODIUM 50 MG: 50 CAPSULE, LIQUID FILLED ORAL at 10:02

## 2018-02-21 RX ADMIN — LEVOTHYROXINE SODIUM 125 MCG: 25 TABLET ORAL at 05:02

## 2018-02-21 RX ADMIN — SIMVASTATIN 20 MG: 20 TABLET, FILM COATED ORAL at 09:02

## 2018-02-21 RX ADMIN — CYANOCOBALAMIN TAB 250 MCG 500 MCG: 250 TAB at 10:02

## 2018-02-21 RX ADMIN — BENZONATATE 100 MG: 100 CAPSULE ORAL at 01:02

## 2018-02-21 RX ADMIN — MONTELUKAST SODIUM 5 MG: 5 TABLET, CHEWABLE ORAL at 09:02

## 2018-02-21 RX ADMIN — FUROSEMIDE 40 MG: 40 TABLET ORAL at 10:02

## 2018-02-21 RX ADMIN — INSULIN ASPART 6 UNITS: 100 INJECTION, SOLUTION INTRAVENOUS; SUBCUTANEOUS at 10:02

## 2018-02-21 NOTE — CONSULTS
[facility]  Cardiothoracic Surgery  Consult Note    Patient Name: Nu Lee  MRN: [id number]  Admission Date: 2/20/2018  Attending Physician: Katya Charles MD  Referring Provider: Referring, Unknown    Patient information was obtained from patient and past medical records.     Consults  Subjective:     Principal Problem: Severe AS    History of Present Illness: Mrs. Lee is a 82 year old female who presents to [facility] for evaluation for AVR for her severe AS.  PMHx includes HTN, HLD, DM2, CKD II/III, chronic A fib s/p pacemaker, DVT, CAD s/p CABG, Aortic stenosis s/p prosthetic valve replacement in  2009, GERD, and arthritis.  She originally presented with chest pain to [facility] on 2/19/18, described as pressure like sensation, associated with SOB.  No changes on EKG. Troponins mildly elevated and flat (at her baseline). Echo showed Aortic valve area 0.61cm. Dr. Romero was also consulted for transfer for TAVR evaluation, and accepted patient. Patient currently without dyspnea, or chest pain.     No current facility-administered medications on file prior to encounter.      Current Outpatient Prescriptions on File Prior to Encounter   Medication Sig    albuterol (VENTOLIN HFA) 90 mcg/actuation inhaler Inhale into the lungs.    ALPRAZolam (XANAX) 0.25 MG tablet Take 0.25 mg by mouth once daily.     apixaban 2.5 mg Tab Take 2.5 mg by mouth 2 (two) times daily.     aspirin (ECOTRIN) 81 MG EC tablet Take 81 mg by mouth once daily.    benazepril (LOTENSIN) 10 MG tablet Take 10 mg by mouth once daily.     CONTOUR TEST STRIPS Strp USE TO TEST BLOOD SUGAR BID    digoxin (LANOXIN) 250 mcg tablet     furosemide (LASIX) 40 MG tablet Take 40 mg by mouth 2 (two) times daily.     magnesium oxide (MAG-OX) 250 mg Tab Take 400 mg by mouth.    metoprolol succinate (TOPROL-XL) 100 MG 24 hr tablet Take 150 mg by mouth 2 (two) times daily.     NOVOLOG MIX 70-30 FLEXPEN 100 unit/mL (70-30) InPn pen  "Inject into the skin 2 (two) times daily. 35 units if glucose <150  40 units if glucose >150    pantoprazole (PROTONIX) 20 MG tablet Take 20 mg by mouth once daily.     cyanocobalamin 500 MCG tablet Take 1 tablet by mouth.    docusate sodium (COLACE) 50 MG capsule Take by mouth 2 (two) times daily.    escitalopram oxalate (LEXAPRO) 20 MG tablet Take 20 mg by mouth once daily.     hydrocodone-acetaminophen 5-325mg (NORCO) 5-325 mg per tablet     INVANZ 1 gram injection     ipratropium-albuterol (COMBIVENT)  mcg/actuation inhaler Inhale into the lungs.    levothyroxine (SYNTHROID) 125 MCG tablet Take 125 mcg by mouth before breakfast.     montelukast (SINGULAIR) 5 MG chewable tablet Take 10 mg by mouth.    ondansetron (ZOFRAN) 8 MG tablet Take by mouth every 8 (eight) hours as needed for Nausea.    potassium gluconate 2.5 mEq Tab Take 1 tablet by mouth.    simvastatin (ZOCOR) 20 MG tablet Take 20 mg by mouth once daily.     traZODone (DESYREL) 100 MG tablet Take 50 mg by mouth.    TRUE METRIX GLUCOSE METER Misc     TRUEPLUS LANCETS 28 gauge Misc     TRUEPLUS PEN NEEDLE 31 gauge x 1/4" Ndle        Review of patient's allergies indicates:  No Known Allergies    Past Medical History:   Diagnosis Date    Arthritis     Coronary artery disease     Diverticulitis     GERD (gastroesophageal reflux disease)     Hyperlipidemia     Hypertension      Past Surgical History:   Procedure Laterality Date    CARDIAC PACEMAKER PLACEMENT  2007    CHOLECYSTECTOMY      CORONARY ARTERY BYPASS GRAFT      HYSTERECTOMY      TOTAL KNEE ARTHROPLASTY Right     TOTAL KNEE ARTHROPLASTY Left      Family History     None        Social History Main Topics    Smoking status: Former Smoker    Smokeless tobacco: Not on file    Alcohol use No    Drug use: No    Sexual activity: Not on file     Review of Systems   Constitutional: Positive for fatigue. Negative for activity change, appetite change and fever.   HENT: " Negative for congestion, dental problem, sneezing and sore throat.    Eyes: Negative for pain, discharge and visual disturbance.   Respiratory: Positive for shortness of breath. Negative for cough and wheezing.    Cardiovascular: Negative for chest pain, palpitations and leg swelling.   Gastrointestinal: Negative for abdominal distention, abdominal pain, constipation, diarrhea, nausea and vomiting.   Endocrine: Negative for polydipsia, polyphagia and polyuria.   Genitourinary: Negative for dysuria, frequency and urgency.   Musculoskeletal: Negative for back pain and gait problem.   Skin: Negative for rash and wound.   Neurological: Negative for dizziness, seizures, syncope and headaches.   Hematological: Does not bruise/bleed easily.   Psychiatric/Behavioral: Negative for agitation and confusion. The patient is not nervous/anxious.      Objective:     Vital Signs (Most Recent):  Temp: 98 °F (36.7 °C) (02/21/18 1203)  Pulse: 89 (02/21/18 1203)  Resp: 20 (02/21/18 1203)  BP: (!) 124/58 (02/21/18 1203)  SpO2: (!) 94 % (02/21/18 1203) Vital Signs (24h Range):  Temp:  [96.6 °F (35.9 °C)-98.3 °F (36.8 °C)] 98 °F (36.7 °C)  Pulse:  [71-89] 89  Resp:  [16-20] 20  SpO2:  [92 %-99 %] 94 %  BP: (104-124)/(53-58) 124/58     Weight: 88 kg (194 lb 0.1 oz)  Body mass index is 35.48 kg/m².    SpO2: (!) 94 %  O2 Device (Oxygen Therapy): room air     Intake/Output - Last 3 Shifts       02/19 0700 - 02/20 0659 02/20 0700 - 02/21 0659 02/21 0700 - 02/22 0659    P.O.  540     Total Intake(mL/kg)  540 (6.1)     Urine (mL/kg/hr)  650     Stool  0     Total Output   650      Net   -110             Urine Occurrence  2 x     Stool Occurrence  0 x            Lines/Drains/Airways     Peripheral Intravenous Line                 Peripheral IV - Single Lumen 02/20/18 1200 Left Antecubital 1 day                 STS Risk Score: 7.328% without LHC     Physical Exam   Constitutional: She is oriented to person, place, and time. She appears  well-developed and well-nourished.   HENT:   Head: Normocephalic and atraumatic.   Eyes: Pupils are equal, round, and reactive to light.   Neck: Normal range of motion. Neck supple.   Cardiovascular: Normal rate and regular rhythm.    Murmur heard.  Pulmonary/Chest: Effort normal and breath sounds normal.   Abdominal: Soft. Bowel sounds are normal.   Musculoskeletal: Normal range of motion.   Neurological: She is alert and oriented to person, place, and time.   Skin: Skin is warm and dry.   Psychiatric: She has a normal mood and affect. Her behavior is normal.       Significant Labs:  CBC:   Recent Labs  Lab 02/20/18 1957   WBC 7.22   RBC 3.64*   HGB 10.9*   HCT 32.0*      MCV 88   MCH 29.9   MCHC 34.1     CMP:   Recent Labs  Lab 02/20/18 1957   *   CALCIUM 9.5   ALBUMIN 3.2*   PROT 6.7      K 4.0   CO2 26      BUN 15   CREATININE 1.0   ALKPHOS 65   ALT 23   AST 41*   BILITOT 0.8       Significant Diagnostics:  2D ECHO 2/21/18   1 - Eccentric LVH with normal left ventricular systolic function (EF 55-60%).     2 - Severe left atrial enlargement.     3 - Normal right ventricular systolic function .     4 - S/P surgical AVR, [place] = 0.8 cm2, AVAi = 0.42 cm2/m2, peak velocity = 4.7 m/s, mean gradient = 60 mmHg.     5 - Mild aortic regurgitation.     6 - Mild tricuspid regurgitation.     Assessment/Plan:     Aortic stenosis     Mrs. Lee is a 82 year old female who presents to [facility] for evaluation for AVR for her severe AS.  PMHx includes HTN, HLD, DM2, CKD II/III, chronic A fib s/p pacemaker, DVT, CAD s/p CABG, Aortic stenosis s/p prosthetic valve replacement in  2009, GERD, and arthritis.     Pt is considered high risk for SAVR based on STS, age, redo status and comorbidities. Please continue TAVR evaluation.             Thank you for your consult. I will sign off. Please contact us if you have any additional questions.    Francine Ambrose NP  Cardiothoracic Surgery  [facility]  High risk for SAVR

## 2018-02-21 NOTE — PLAN OF CARE
Problem: Patient Care Overview  Goal: Plan of Care Review  Outcome: Ongoing (interventions implemented as appropriate)  Pt is AAOx4 and VSS.  Pt does not have any skin breakdown and position self independently.  Pt is on telemetry A. Fib and A paced.  Lt cw pacer. 2L NC. Nighttime BG was 132, no coverage needed. Bed is in lowest locked position, call light is within reach.  Family is at bedside and assists pt as needed.  Plan for TAVR eval in the morning.  C/o pain once during night and was relieved with Norco PO. Will continue to monitor.

## 2018-02-21 NOTE — H&P
History and Physical  Hospital Medicine       Patient Name: Nu Lee  MRN:  [id number]  Hospital Medicine Team: [facility] [place] [facility] Jonathan Badillo MD  Date of Admission:  2/20/2018     Principal Problem:  <principal problem not specified>   Primary care Physician: Michela Christian MD      History of Present Illness:     Patient information was obtained from patient, past medical records and ER records.     82 F with a PMH of HTN, HLD DM2, CKD II/III, chronic A fib s/p pacemaker, DVT, CAD s/p CABG, Aortic stenosis s/p prosthetic valve replacement 2009, GERD, and arthritis.  She presented with chest pain to [facility] 2/19/18, described as pressure like sensation, associated with SOB.  No changes on EKG. Troponins mildly elevated and flat (at her baseline). Echo showed Aortic valve area 0.61cm. Dr. Romero with cards was consulted for transfer for TAVR evaluation, and accepted patient. Patient currently without dyspnea, or chest pain.      Review of Systems   Constitutional: Negative for chills, fatigue and fever.   HENT: Negative for sore throat and trouble swallowing.    Eyes: Negative for photophobia and visual disturbance.   Respiratory: Negative for cough and positive for shortness of breath.    Cardiovascular: Positive for chest pain, negative for palpitations and negative for leg swelling.   Gastrointestinal: Negative for abdominal pain, constipation, diarrhea, nausea and vomiting.   Endocrine: Negative for cold intolerance and heat intolerance.   Genitourinary: Negative for dysuria and frequency.   Musculoskeletal: Negative for arthralgias and myalgias.   Skin: Negative for rash and wound.   Neurological: Negative for dizziness, syncope, weakness and light-headedness.   Psychiatric/Behavioral: Negative for confusion and hallucinations.   All other systems reviewed and are negative.      Past Medical History: Patient has a past medical history of Arthritis; Coronary artery disease; Diverticulitis; GERD  (gastroesophageal reflux disease); Hyperlipidemia; and Hypertension.    Past Surgical History: Patient has a past surgical history that includes Cardiac pacemaker placement (2007); Coronary artery bypass graft; Hysterectomy; Cholecystectomy; Total knee arthroplasty (Right); and Total knee arthroplasty (Left).    Social History: Patient reports that she has quit smoking. She does not have any smokeless tobacco history on file. She reports that she does not drink alcohol or use drugs.    Family History: family history is not on file.    Medications: Scheduled Meds:   albuterol-ipratropium 2.5mg-0.5mg/3mL  3 mL Nebulization Q6H WAKE    apixaban  2.5 mg Oral BID    [START ON 2/21/2018] aspirin  81 mg Oral Daily    [START ON 2/21/2018] benazepril  10 mg Oral Daily    [START ON 2/21/2018] cyanocobalamin  500 mcg Oral Daily    [START ON 2/21/2018] digoxin  0.25 mg Oral Daily    docusate sodium  50 mg Oral BID    [START ON 2/21/2018] escitalopram oxalate  10 mg Oral Daily    [START ON 2/21/2018] furosemide  40 mg Oral BID    [START ON 2/21/2018] insulin aspart U-100  6 Units Subcutaneous TIDWM    [START ON 2/21/2018] insulin detemir U-100  17 Units Subcutaneous Daily    [START ON 2/21/2018] levothyroxine  125 mcg Oral Before breakfast    magnesium oxide  500 mg Oral BID    [START ON 2/21/2018] metoprolol succinate  100 mg Oral Daily    montelukast  5 mg Oral QHS    [START ON 2/21/2018] pantoprazole  40 mg Oral Daily    simvastatin  20 mg Oral QHS     Continuous Infusions:  PRN Meds:.ALPRAZolam, dextrose 50%, dextrose 50%, glucagon (human recombinant), glucose, glucose, hydrocodone-acetaminophen 5-325mg, insulin aspart U-100, ondansetron, potassium chloride 10%, potassium chloride 10%, sodium chloride 0.9%, traZODone    Allergies: Patient has No Known Allergies.    Physical Exam:     Vital Signs (Most Recent):  Temp: 98.3 °F (36.8 °C) (02/20/18 1953)  Pulse: 75 (02/20/18 1953)  Resp: 18 (02/20/18 1953)  BP:  (!) 117/58 (02/20/18 1953)  SpO2: (!) 94 % (02/20/18 1953) Vital Signs Range (Last 24H):  Temp:  [98.3 °F (36.8 °C)]   Pulse:  [75]   Resp:  [18]   BP: (117)/(58)   SpO2:  [94 %]    There is no height or weight on file to calculate BMI.     Constitutional: Oriented to person, place, and time. Appears well-developed and obese.  Head: Normocephalic and atraumatic.   Mouth/Throat: Oropharynx is clear and moist.   Eyes: EOM are normal. Pupils are equal, round, and reactive to light. No scleral icterus.   Neck: Normal range of motion. Neck supple.   Cardiovascular: Normal rate and regular rhythm.  3/6 systolic murmur noted R upper sternal border.  Pulmonary/Chest: Effort normal and breath sounds normal. No respiratory distress. No wheezes, rales, or rhonchi  Abdominal: Soft. Bowel sounds are normal.  No distension or tenderness  Musculoskeletal: Normal range of motion. No edema.   Neurological: Alert and oriented to person, place, and time.   Skin: Skin is warm and dry.   Psychiatric: Normal mood and affect. Behavior is normal.   Vitals reviewed.    No results for input(s): WBC, HGB, HCT, PLT in the last 168 hours.    No results for input(s): NA, K, CL, CO2, BUN, CREATININE, GLU, CALCIUM, MG, PHOS, LIPASE, AMYLASE in the last 168 hours.  No results for input(s): ALKPHOS, ALT, AST, ALBUMIN, PROT, BILITOT, INR in the last 168 hours.   No results for input(s): POCTGLUCOSE in the last 168 hours.      Assessment and Plan:     Ms. Nu Lee is a 82 y.o. female who presented to [facility] on 2/20/2018 with     Active Hospital Problems    Diagnosis  POA    Aortic stenosis [I35.0]  Yes      Resolved Hospital Problems    Diagnosis Date Resolved POA   No resolved problems to display.     Severe Aortic stenosis:  Per signout patient with Aortic valve area 0.61cm, not transferred with ECHO report or disc, currently HDS, sent for TAVR evaluation  -ECHO in AM  -Request records in AM  -Consult interventional cards in  AM    HTN:  Stable  -Continue benazepril 10mg daily  -Continue metoprolol 100mg daily  -Continue lasix 40mg BID    HLD:  -Continue simvastatin 20mg QHS    H/o HFpEF, ICM s/p CABG  Per chart  -F/u ECHO  -Continue metoprolol, ASA, simvastatin, benazepril    H/o A fib s/p pacemaker  Stable, on anticoagulation  -f/u EKG  -Cont Metoprolol  -continue Eliquis 2.5mg BID    COPD, chronic  Stable  -Continue duonebs Q6H WA  -Continue monteleukast    Hypothyroidism  Stable, check TSH in AM  -Continue levothyroxine 125mcg daily    GERD  Stable  -Continue protonix 40mg daily     DVT Prophylaxis: Eliquis    Disposition:  Pending TAVR james Badillo MD  [facility]  Spectra:  [phone]  Pager:  [phone]

## 2018-02-21 NOTE — NURSING
Pt AA0x3 and VSS.  Two side rails up, bed locked, call light within reach. No falls noted as these precautions remain. Pt free of skin breakdown as the pt moves well independently. Patient had exploratory angioplasty today, to determine  TAVR, Blood glucose remained stabled, continues on 2L O2, no complaints of pain through out the shift, Hourly rounds made and no complaints at this time noted. No adverse events during shift,  will resume with plan of care and continue to monitor.

## 2018-02-21 NOTE — SUBJECTIVE & OBJECTIVE
Past Medical History:   Diagnosis Date    Arthritis     Coronary artery disease     Diverticulitis     GERD (gastroesophageal reflux disease)     Hyperlipidemia     Hypertension        Past Surgical History:   Procedure Laterality Date    CARDIAC PACEMAKER PLACEMENT  2007    CHOLECYSTECTOMY      CORONARY ARTERY BYPASS GRAFT      HYSTERECTOMY      TOTAL KNEE ARTHROPLASTY Right     TOTAL KNEE ARTHROPLASTY Left        Review of patient's allergies indicates:  No Known Allergies    PTA Medications   Medication Sig    albuterol (VENTOLIN HFA) 90 mcg/actuation inhaler Inhale into the lungs.    ALPRAZolam (XANAX) 0.25 MG tablet Take 0.25 mg by mouth once daily.     apixaban 2.5 mg Tab Take 2.5 mg by mouth 2 (two) times daily.     aspirin (ECOTRIN) 81 MG EC tablet Take 81 mg by mouth once daily.    benazepril (LOTENSIN) 10 MG tablet Take 10 mg by mouth once daily.     CONTOUR TEST STRIPS Strp USE TO TEST BLOOD SUGAR BID    digoxin (LANOXIN) 250 mcg tablet     furosemide (LASIX) 40 MG tablet Take 40 mg by mouth 2 (two) times daily.     magnesium oxide (MAG-OX) 250 mg Tab Take 400 mg by mouth.    metoprolol succinate (TOPROL-XL) 100 MG 24 hr tablet Take 150 mg by mouth 2 (two) times daily.     NOVOLOG MIX 70-30 FLEXPEN 100 unit/mL (70-30) InPn pen Inject into the skin 2 (two) times daily. 35 units if glucose <150  40 units if glucose >150    pantoprazole (PROTONIX) 20 MG tablet Take 20 mg by mouth once daily.     cyanocobalamin 500 MCG tablet Take 1 tablet by mouth.    docusate sodium (COLACE) 50 MG capsule Take by mouth 2 (two) times daily.    escitalopram oxalate (LEXAPRO) 20 MG tablet Take 20 mg by mouth once daily.     hydrocodone-acetaminophen 5-325mg (NORCO) 5-325 mg per tablet     INVANZ 1 gram injection     ipratropium-albuterol (COMBIVENT)  mcg/actuation inhaler Inhale into the lungs.    levothyroxine (SYNTHROID) 125 MCG tablet Take 125 mcg by mouth before breakfast.      "montelukast (SINGULAIR) 5 MG chewable tablet Take 10 mg by mouth.    ondansetron (ZOFRAN) 8 MG tablet Take by mouth every 8 (eight) hours as needed for Nausea.    potassium gluconate 2.5 mEq Tab Take 1 tablet by mouth.    simvastatin (ZOCOR) 20 MG tablet Take 20 mg by mouth once daily.     traZODone (DESYREL) 100 MG tablet Take 50 mg by mouth.    TRUE METRIX GLUCOSE METER Misc     TRUEPLUS LANCETS 28 gauge Misc     TRUEPLUS PEN NEEDLE 31 gauge x 1/4" Ndle      Family History     None        Social History Main Topics    Smoking status: Former Smoker    Smokeless tobacco: Not on file    Alcohol use No    Drug use: No    Sexual activity: Not on file     Review of Systems   Constitution: Negative for chills, diaphoresis, fever, weakness, weight gain and weight loss.   HENT: Negative for sore throat.    Eyes: Negative for blurred vision, vision loss in left eye, vision loss in right eye and visual disturbance.   Cardiovascular: Positive for chest pain, dyspnea on exertion and orthopnea. Negative for claudication, leg swelling, near-syncope, palpitations, paroxysmal nocturnal dyspnea and syncope.   Respiratory: Positive for shortness of breath. Negative for cough, hemoptysis, sputum production and wheezing.    Endocrine: Negative for cold intolerance and heat intolerance.   Hematologic/Lymphatic: Negative for adenopathy. Does not bruise/bleed easily.   Skin: Negative for rash.   Musculoskeletal: Negative for falls, muscle weakness and myalgias.        Fibromyalgia    Gastrointestinal: Negative for abdominal pain, change in bowel habit, constipation, diarrhea, melena and nausea.   Genitourinary: Negative for bladder incontinence.   Neurological: Negative for dizziness, focal weakness, headaches, light-headedness and numbness.   Psychiatric/Behavioral: Negative for altered mental status.     Objective:     Vital Signs (Most Recent):  Temp: 98 °F (36.7 °C) (02/21/18 1203)  Pulse: 75 (02/21/18 1534)  Resp: 20 " (02/21/18 1534)  BP: (!) 124/58 (02/21/18 1203)  SpO2: 95 % (02/21/18 1534) Vital Signs (24h Range):  Temp:  [96.6 °F (35.9 °C)-98.3 °F (36.8 °C)] 98 °F (36.7 °C)  Pulse:  [71-89] 75  Resp:  [16-20] 20  SpO2:  [92 %-99 %] 95 %  BP: (104-124)/(53-58) 124/58     Weight: 88 kg (194 lb 0.1 oz)  Body mass index is 35.48 kg/m².    SpO2: 95 %  O2 Device (Oxygen Therapy): nasal cannula w/ humidification      Intake/Output Summary (Last 24 hours) at 02/21/18 1546  Last data filed at 02/21/18 0500   Gross per 24 hour   Intake              540 ml   Output              650 ml   Net             -110 ml       Lines/Drains/Airways     Peripheral Intravenous Line                 Peripheral IV - Single Lumen 02/20/18 1200 Left Antecubital 1 day                Physical Exam   Constitutional: She is oriented to person, place, and time. She appears well-developed and well-nourished. No distress.   HENT:   Head: Normocephalic and atraumatic.   Mouth/Throat: Oropharynx is clear and moist.   Eyes: Conjunctivae and EOM are normal. Pupils are equal, round, and reactive to light. No scleral icterus.   Neck: Neck supple. No JVD present. No tracheal deviation present.   Cardiovascular: Exam reveals no gallop and no friction rub.    Murmur heard.  Pulmonary/Chest: Effort normal. No respiratory distress. She has no wheezes. She has rales. She exhibits no tenderness.   Abdominal: Soft. Bowel sounds are normal. She exhibits no distension. There is no hepatosplenomegaly. There is no tenderness.   Musculoskeletal: She exhibits no edema or tenderness.   Neurological: She is alert and oriented to person, place, and time.   Skin: Skin is warm and dry. No rash noted. No erythema.   Psychiatric: She has a normal mood and affect. Her behavior is normal.       Significant Labs:   ABG: No results for input(s): PH, PCO2, HCO3, POCSATURATED, BE in the last 48 hours., BMP:   Recent Labs  Lab 02/20/18 1957   *      K 4.0      CO2 26   BUN 15    CREATININE 1.0   CALCIUM 9.5    and CMP   Recent Labs  Lab 02/20/18 1957      K 4.0      CO2 26   *   BUN 15   CREATININE 1.0   CALCIUM 9.5   PROT 6.7   ALBUMIN 3.2*   BILITOT 0.8   ALKPHOS 65   AST 41*   ALT 23   ANIONGAP 10   ESTGFRAFRICA >60.0   EGFRNONAA 53.0*       Significant Imaging: CXR  Portable AP view of the chest was obtained.  Comparison -- none. A pacemaker is present on the left with electrodes to right atrium and ventricle. Postoperative changes from sternotomy are present, likely aortic valve replacement. The heart size is mildly enlarged. Mediastinum shows aortic atherosclerosis. The lungs are expanded and clear. No lung consolidation or pleural fluid is identified. Skeletal structures show no acute finding, noting wire sutures in the sternum which are aligned.   Impression      Status post cardiac surgery. Mild cardiomegaly. No acute process.      Electronically signed by: MARK CARBONE MD  Date: 02/21/18  Time: 07:30

## 2018-02-21 NOTE — CONSULTS
[facility]  Interventional Cardiology  Consult Note    Patient Name: Nu Lee  MRN: [id number]  Admission Date: 2/20/2018  Hospital Length of Stay: 1 days  Code Status: Full Code   Attending Provider: Katya Charles MD  Consulting Provider: Pat Frank NP  Primary Care Physician: Michela Christian MD  Principal Problem:<principal problem not specified>    Patient information was obtained from patient, relative(s), past medical records and ER records.     Inpatient consult to Interventional Cardiology  Consult performed by: PAT FRANK.  Consult ordered by: CASTRO GUO        Subjective:     Chief Complaint:  SOB and CP     HPI:  82 F with a PMH of HTN, HLD DM2, CKD II/III, chronic A fib (on eliquis) s/p pacemaker, DVT (although patient is unsure if she ever had a DVT), CAD s/p CABG, Aortic stenosis s/p AVR (25mm Magna) by Dr. Ramos 2009,  Asc Aortic Aneurysm (4.8cm per patient) s/p repair (2009), GERD, fibromyalgia, bilateral knee replacement and arthritis.     This past Sunday she was SOB and her home health RN called Dr. Hooper who suggested that she go to the ED. She presented with chest pain to [facility] 2/19/18, described as pressure like sensation, associated with SOB.  No changes on EKG. Troponins mildly elevated and flat (at her baseline). Echo showed Aortic valve area 0.61cm. Dr. Romero  was consulted for transfer for TAVR evaluation, and accepted patient. Patient currently without dyspnea, or chest pain.     She was hospitalized in November of 2017 with diverticulitis when echo revealed severe AS of bioprosthetic valve.  She formerly followed with Dr. Benitez but now follows with Dr. Hooper. Dr. Hooper referred her for Delisa TAVR evaluation. She reports she has been increasingly SOB and experiences PAINTER when ambulating ~100ft. She has 2 pillow orthopnea. She uses a walker at home due to instability and fear of falling. She reports CP diffuse retrosternal  with radiation to both sides of her neck and jaw with taking deep breath. She denies current SOB or CP. She is currently on 2 L O2 though she is not on home O2. She denies syncope, pre-syncope, palpitations, LE edema. She denies coronary angiogram since CABG. Notably she also has a 4.8 cm Ascending Aortic Aneurysm per patient.     Her AVR was performed by Dr. Ramos in [facility] in [place] and operative report will be scanned into [facility].        Past Medical History:   Diagnosis Date    Arthritis     Coronary artery disease     Diverticulitis     GERD (gastroesophageal reflux disease)     Hyperlipidemia     Hypertension        Past Surgical History:   Procedure Laterality Date    CARDIAC PACEMAKER PLACEMENT  2007    CHOLECYSTECTOMY      CORONARY ARTERY BYPASS GRAFT      HYSTERECTOMY      TOTAL KNEE ARTHROPLASTY Right     TOTAL KNEE ARTHROPLASTY Left        Review of patient's allergies indicates:  No Known Allergies    PTA Medications   Medication Sig    albuterol (VENTOLIN HFA) 90 mcg/actuation inhaler Inhale into the lungs.    ALPRAZolam (XANAX) 0.25 MG tablet Take 0.25 mg by mouth once daily.     apixaban 2.5 mg Tab Take 2.5 mg by mouth 2 (two) times daily.     aspirin (ECOTRIN) 81 MG EC tablet Take 81 mg by mouth once daily.    benazepril (LOTENSIN) 10 MG tablet Take 10 mg by mouth once daily.     CONTOUR TEST STRIPS Strp USE TO TEST BLOOD SUGAR BID    digoxin (LANOXIN) 250 mcg tablet     furosemide (LASIX) 40 MG tablet Take 40 mg by mouth 2 (two) times daily.     magnesium oxide (MAG-OX) 250 mg Tab Take 400 mg by mouth.    metoprolol succinate (TOPROL-XL) 100 MG 24 hr tablet Take 150 mg by mouth 2 (two) times daily.     NOVOLOG MIX 70-30 FLEXPEN 100 unit/mL (70-30) InPn pen Inject into the skin 2 (two) times daily. 35 units if glucose <150  40 units if glucose >150    pantoprazole (PROTONIX) 20 MG tablet Take 20 mg by mouth once daily.     cyanocobalamin 500 MCG tablet Take 1 tablet by  "mouth.    docusate sodium (COLACE) 50 MG capsule Take by mouth 2 (two) times daily.    escitalopram oxalate (LEXAPRO) 20 MG tablet Take 20 mg by mouth once daily.     hydrocodone-acetaminophen 5-325mg (NORCO) 5-325 mg per tablet     INVANZ 1 gram injection     ipratropium-albuterol (COMBIVENT)  mcg/actuation inhaler Inhale into the lungs.    levothyroxine (SYNTHROID) 125 MCG tablet Take 125 mcg by mouth before breakfast.     montelukast (SINGULAIR) 5 MG chewable tablet Take 10 mg by mouth.    ondansetron (ZOFRAN) 8 MG tablet Take by mouth every 8 (eight) hours as needed for Nausea.    potassium gluconate 2.5 mEq Tab Take 1 tablet by mouth.    simvastatin (ZOCOR) 20 MG tablet Take 20 mg by mouth once daily.     traZODone (DESYREL) 100 MG tablet Take 50 mg by mouth.    TRUE METRIX GLUCOSE METER Misc     TRUEPLUS LANCETS 28 gauge Misc     TRUEPLUS PEN NEEDLE 31 gauge x 1/4" Ndle      Family History     None        Social History Main Topics    Smoking status: Former Smoker    Smokeless tobacco: Not on file    Alcohol use No    Drug use: No    Sexual activity: Not on file     Review of Systems   Constitution: Negative for chills, diaphoresis, fever, weakness, weight gain and weight loss.   HENT: Negative for sore throat.    Eyes: Negative for blurred vision, vision loss in left eye, vision loss in right eye and visual disturbance.   Cardiovascular: Positive for chest pain, dyspnea on exertion and orthopnea. Negative for claudication, leg swelling, near-syncope, palpitations, paroxysmal nocturnal dyspnea and syncope.   Respiratory: Positive for shortness of breath. Negative for cough, hemoptysis, sputum production and wheezing.    Endocrine: Negative for cold intolerance and heat intolerance.   Hematologic/Lymphatic: Negative for adenopathy. Does not bruise/bleed easily.   Skin: Negative for rash.   Musculoskeletal: Negative for falls, muscle weakness and myalgias.        Fibromyalgia  "   Gastrointestinal: Negative for abdominal pain, change in bowel habit, constipation, diarrhea, melena and nausea.   Genitourinary: Negative for bladder incontinence.   Neurological: Negative for dizziness, focal weakness, headaches, light-headedness and numbness.   Psychiatric/Behavioral: Negative for altered mental status.     Objective:     Vital Signs (Most Recent):  Temp: 98 °F (36.7 °C) (02/21/18 1203)  Pulse: 75 (02/21/18 1534)  Resp: 20 (02/21/18 1534)  BP: (!) 124/58 (02/21/18 1203)  SpO2: 95 % (02/21/18 1534) Vital Signs (24h Range):  Temp:  [96.6 °F (35.9 °C)-98.3 °F (36.8 °C)] 98 °F (36.7 °C)  Pulse:  [71-89] 75  Resp:  [16-20] 20  SpO2:  [92 %-99 %] 95 %  BP: (104-124)/(53-58) 124/58     Weight: 88 kg (194 lb 0.1 oz)  Body mass index is 35.48 kg/m².    SpO2: 95 %  O2 Device (Oxygen Therapy): nasal cannula w/ humidification      Intake/Output Summary (Last 24 hours) at 02/21/18 1546  Last data filed at 02/21/18 0500   Gross per 24 hour   Intake              540 ml   Output              650 ml   Net             -110 ml       Lines/Drains/Airways     Peripheral Intravenous Line                 Peripheral IV - Single Lumen 02/20/18 1200 Left Antecubital 1 day                Physical Exam   Constitutional: She is oriented to person, place, and time. She appears well-developed and well-nourished. No distress.   HENT:   Head: Normocephalic and atraumatic.   Mouth/Throat: Oropharynx is clear and moist.   Eyes: Conjunctivae and EOM are normal. Pupils are equal, round, and reactive to light. No scleral icterus.   Neck: Neck supple. No JVD present. No tracheal deviation present.   Cardiovascular: Exam reveals no gallop and no friction rub.    Murmur heard.  Pulmonary/Chest: Effort normal. No respiratory distress. She has no wheezes. She has rales. She exhibits no tenderness.   Abdominal: Soft. Bowel sounds are normal. She exhibits no distension. There is no hepatosplenomegaly. There is no tenderness.    Musculoskeletal: She exhibits no edema or tenderness.   Neurological: She is alert and oriented to person, place, and time.   Skin: Skin is warm and dry. No rash noted. No erythema.   Psychiatric: She has a normal mood and affect. Her behavior is normal.       Significant Labs:   ABG: No results for input(s): PH, PCO2, HCO3, POCSATURATED, BE in the last 48 hours., BMP:   Recent Labs  Lab 02/20/18 1957   *      K 4.0      CO2 26   BUN 15   CREATININE 1.0   CALCIUM 9.5    and CMP   Recent Labs  Lab 02/20/18 1957      K 4.0      CO2 26   *   BUN 15   CREATININE 1.0   CALCIUM 9.5   PROT 6.7   ALBUMIN 3.2*   BILITOT 0.8   ALKPHOS 65   AST 41*   ALT 23   ANIONGAP 10   ESTGFRAFRICA >60.0   EGFRNONAA 53.0*       Significant Imaging: CXR  Portable AP view of the chest was obtained.  Comparison -- none. A pacemaker is present on the left with electrodes to right atrium and ventricle. Postoperative changes from sternotomy are present, likely aortic valve replacement. The heart size is mildly enlarged. Mediastinum shows aortic atherosclerosis. The lungs are expanded and clear. No lung consolidation or pleural fluid is identified. Skeletal structures show no acute finding, noting wire sutures in the sternum which are aligned.   Impression      Status post cardiac surgery. Mild cardiomegaly. No acute process.      Electronically signed by: MARK CARBONE MD  Date: 02/21/18  Time: 07:30          Assessment and Plan:     Aortic stenosis    Nu Lee is a 82 y.o. female referred by Dr Hooper for evaluation of severe AS (NYHA Class III sx).    she has undergone the following Delisa TAVR work-up:   S/p AVR (25mm Magna) by Dr. Ramos 2009  ECHO (Date 2/21/2018): CHARLOTTE= 0.8 cm2, iAVA 0.42 cm2/m2, MG= 60 mmHg, Peak Tyler= 4.7m/s, EF= 55%.   [facility] (Date): Needs   STS: 7.4%   Frailty: 2/4   Iliacs are pending  LVOT area by CTA is pending   CTS risk assessment: Pending   PFTs: Needs      CTS consult today, appreciate their assistance     Will need the following to complete Delisa TAVR work-up:  -TAVR CTA  -Coronary Angiogram +/- PCI per Dr. Romero  -PFTs (ordered)                    VTE Risk Mitigation         Ordered     apixaban tablet 2.5 mg  2 times daily     Route:  Oral        02/20/18 2011          Thank you for your consult. I will follow-up with patient. Please contact us if you have any additional questions.    Hayde Frank NP  Interventional Cardiology   [facility]

## 2018-02-21 NOTE — HPI
Mrs. Lee is a 82 year old female who presents to [facility] for evaluation for AVR for her severe AS.  PMHx includes HTN, HLD, DM2, CKD II/III, chronic A fib s/p pacemaker, DVT, CAD s/p CABG, Aortic stenosis s/p prosthetic valve replacement in  2009, GERD, and arthritis.  She originally presented with chest pain to [facility] on 2/19/18, described as pressure like sensation, associated with SOB.  No changes on EKG. Troponins mildly elevated and flat (at her baseline). Echo showed Aortic valve area 0.61cm. Dr. Romero was also consulted for transfer for TAVR evaluation, and accepted patient. Patient currently without dyspnea, or chest pain.

## 2018-02-21 NOTE — SUBJECTIVE & OBJECTIVE
No current facility-administered medications on file prior to encounter.      Current Outpatient Prescriptions on File Prior to Encounter   Medication Sig    albuterol (VENTOLIN HFA) 90 mcg/actuation inhaler Inhale into the lungs.    ALPRAZolam (XANAX) 0.25 MG tablet Take 0.25 mg by mouth once daily.     apixaban 2.5 mg Tab Take 2.5 mg by mouth 2 (two) times daily.     aspirin (ECOTRIN) 81 MG EC tablet Take 81 mg by mouth once daily.    benazepril (LOTENSIN) 10 MG tablet Take 10 mg by mouth once daily.     CONTOUR TEST STRIPS Str USE TO TEST BLOOD SUGAR BID    digoxin (LANOXIN) 250 mcg tablet     furosemide (LASIX) 40 MG tablet Take 40 mg by mouth 2 (two) times daily.     magnesium oxide (MAG-OX) 250 mg Tab Take 400 mg by mouth.    metoprolol succinate (TOPROL-XL) 100 MG 24 hr tablet Take 150 mg by mouth 2 (two) times daily.     NOVOLOG MIX 70-30 FLEXPEN 100 unit/mL (70-30) InPn pen Inject into the skin 2 (two) times daily. 35 units if glucose <150  40 units if glucose >150    pantoprazole (PROTONIX) 20 MG tablet Take 20 mg by mouth once daily.     cyanocobalamin 500 MCG tablet Take 1 tablet by mouth.    docusate sodium (COLACE) 50 MG capsule Take by mouth 2 (two) times daily.    escitalopram oxalate (LEXAPRO) 20 MG tablet Take 20 mg by mouth once daily.     hydrocodone-acetaminophen 5-325mg (NORCO) 5-325 mg per tablet     INVANZ 1 gram injection     ipratropium-albuterol (COMBIVENT)  mcg/actuation inhaler Inhale into the lungs.    levothyroxine (SYNTHROID) 125 MCG tablet Take 125 mcg by mouth before breakfast.     montelukast (SINGULAIR) 5 MG chewable tablet Take 10 mg by mouth.    ondansetron (ZOFRAN) 8 MG tablet Take by mouth every 8 (eight) hours as needed for Nausea.    potassium gluconate 2.5 mEq Tab Take 1 tablet by mouth.    simvastatin (ZOCOR) 20 MG tablet Take 20 mg by mouth once daily.     traZODone (DESYREL) 100 MG tablet Take 50 mg by mouth.    TRUE METRIX GLUCOSE METER  "Misc     TRUEPLUS LANCETS 28 gauge Misc     TRUEPLUS PEN NEEDLE 31 gauge x 1/4" Ndle        Review of patient's allergies indicates:  No Known Allergies    Past Medical History:   Diagnosis Date    Arthritis     Coronary artery disease     Diverticulitis     GERD (gastroesophageal reflux disease)     Hyperlipidemia     Hypertension      Past Surgical History:   Procedure Laterality Date    CARDIAC PACEMAKER PLACEMENT  2007    CHOLECYSTECTOMY      CORONARY ARTERY BYPASS GRAFT      HYSTERECTOMY      TOTAL KNEE ARTHROPLASTY Right     TOTAL KNEE ARTHROPLASTY Left      Family History     None        Social History Main Topics    Smoking status: Former Smoker    Smokeless tobacco: Not on file    Alcohol use No    Drug use: No    Sexual activity: Not on file     Review of Systems   Constitutional: Positive for fatigue. Negative for activity change, appetite change and fever.   HENT: Negative for congestion, dental problem, sneezing and sore throat.    Eyes: Negative for pain, discharge and visual disturbance.   Respiratory: Positive for shortness of breath. Negative for cough and wheezing.    Cardiovascular: Negative for chest pain, palpitations and leg swelling.   Gastrointestinal: Negative for abdominal distention, abdominal pain, constipation, diarrhea, nausea and vomiting.   Endocrine: Negative for polydipsia, polyphagia and polyuria.   Genitourinary: Negative for dysuria, frequency and urgency.   Musculoskeletal: Negative for back pain and gait problem.   Skin: Negative for rash and wound.   Neurological: Negative for dizziness, seizures, syncope and headaches.   Hematological: Does not bruise/bleed easily.   Psychiatric/Behavioral: Negative for agitation and confusion. The patient is not nervous/anxious.      Objective:     Vital Signs (Most Recent):  Temp: 98 °F (36.7 °C) (02/21/18 1203)  Pulse: 89 (02/21/18 1203)  Resp: 20 (02/21/18 1203)  BP: (!) 124/58 (02/21/18 1203)  SpO2: (!) 94 % (02/21/18 " 1203) Vital Signs (24h Range):  Temp:  [96.6 °F (35.9 °C)-98.3 °F (36.8 °C)] 98 °F (36.7 °C)  Pulse:  [71-89] 89  Resp:  [16-20] 20  SpO2:  [92 %-99 %] 94 %  BP: (104-124)/(53-58) 124/58     Weight: 88 kg (194 lb 0.1 oz)  Body mass index is 35.48 kg/m².    SpO2: (!) 94 %  O2 Device (Oxygen Therapy): room air     Intake/Output - Last 3 Shifts       02/19 0700 - 02/20 0659 02/20 0700 - 02/21 0659 02/21 0700 - 02/22 0659    P.O.  540     Total Intake(mL/kg)  540 (6.1)     Urine (mL/kg/hr)  650     Stool  0     Total Output   650      Net   -110             Urine Occurrence  2 x     Stool Occurrence  0 x            Lines/Drains/Airways     Peripheral Intravenous Line                 Peripheral IV - Single Lumen 02/20/18 1200 Left Antecubital 1 day                 STS Risk Score:     Physical Exam   Constitutional: She is oriented to person, place, and time. She appears well-developed and well-nourished.   HENT:   Head: Normocephalic and atraumatic.   Eyes: Pupils are equal, round, and reactive to light.   Neck: Normal range of motion. Neck supple.   Cardiovascular: Normal rate and regular rhythm.    Murmur heard.  Pulmonary/Chest: Effort normal and breath sounds normal.   Abdominal: Soft. Bowel sounds are normal.   Musculoskeletal: Normal range of motion.   Neurological: She is alert and oriented to person, place, and time.   Skin: Skin is warm and dry.   Psychiatric: She has a normal mood and affect. Her behavior is normal.       Significant Labs:  CBC:   Recent Labs  Lab 02/20/18 1957   WBC 7.22   RBC 3.64*   HGB 10.9*   HCT 32.0*      MCV 88   MCH 29.9   MCHC 34.1     CMP:   Recent Labs  Lab 02/20/18 1957   *   CALCIUM 9.5   ALBUMIN 3.2*   PROT 6.7      K 4.0   CO2 26      BUN 15   CREATININE 1.0   ALKPHOS 65   ALT 23   AST 41*   BILITOT 0.8       Significant Diagnostics:  2D ECHO 2/21/18   1 - Eccentric LVH with normal left ventricular systolic function (EF 55-60%).     2 - Severe left  atrial enlargement.     3 - Normal right ventricular systolic function .     4 - S/P surgical AVR, CHARLOTTE = 0.8 cm2, AVAi = 0.42 cm2/m2, peak velocity = 4.7 m/s, mean gradient = 60 mmHg.     5 - Mild aortic regurgitation.     6 - Mild tricuspid regurgitation.

## 2018-02-21 NOTE — PLAN OF CARE
Spoke to Anai at [facility] medical records ([phone]). She did fax the Echo report to this CM and they are sending the disc. Report placed in pt's chart.

## 2018-02-21 NOTE — HPI
82 F with a PMH of HTN, HLD DM2, CKD II/III, chronic A fib (on eliquis) s/p pacemaker, DVT (although patient is unsure if she ever had a DVT), CAD s/p CABG, Aortic stenosis s/p AVR (25mm Magna) by Dr. Ramos 2009, Asc Aortic Aneurysm (4.8cm per patient) s/p repair (2009), GERD, fibromyalgia, bilateral knee replacement and arthritis.     This past Sunday she was SOB and her home health RN called Dr. Hooper who suggested that she go to the ED. She presented with chest pain to [facility] 2/19/18, described as pressure like sensation, associated with SOB.  No changes on EKG. Troponins mildly elevated and flat (at her baseline). Echo showed Aortic valve area 0.61cm. Dr. Romero  was consulted for transfer for TAVR evaluation, and accepted patient. Patient currently without dyspnea, or chest pain.     She was hospitalized in November of 2017 with diverticulitis when echo revealed severe AS of bioprosthetic valve.  She formerly followed with Dr. Benitez but now follows with Dr. Hooper. Dr. Hooper referred her for Delisa TAVR evaluation. She reports she has been increasingly SOB and experiences PAINTRE when ambulating ~100ft. She has 2 pillow orthopnea. She uses a walker at home due to instability and fear of falling. She reports CP diffuse retrosternal with radiation to both sides of her neck and jaw with taking deep breath. She denies current SOB or CP. She is currently on 2 L O2 though she is not on home O2. She denies syncope, pre-syncope, palpitations, LE edema. She denies coronary angiogram since CABG. Notably she also has a 4.8 cm Ascending Aortic Aneurysm per patient.     Her AVR was performed by Dr. Ramos in [facility] in [place] and operative report will be scanned into [facility].

## 2018-02-21 NOTE — ASSESSMENT & PLAN NOTE
Nu Lee is a 82 y.o. female referred by Dr Hooper for evaluation of severe AS (NYHA Class III sx).    she has undergone the following Delisa TAVR work-up:   S/p AVR (25mm Magna) by Dr. Ramos 2009  ECHO (Date 2/21/2018): CHARLOTTE= 0.8 cm2, iAVA 0.42 cm2/m2, MG= 60 mmHg, Peak Tyler= 4.7m/s, EF= 55%.   [facility] (Date): Needs   STS: 7.4%   Frailty: 2/4   Iliacs are pending  LVOT area by CTA is pending   CTS risk assessment: Pending   PFTs: Needs     CTS consult today, appreciate their assistance     Will need the following to complete Delisa TAVR work-up:  -TAVR CTA  -Coronary Angiogram +/- PCI per Dr. Romero  -PFTs (ordered)

## 2018-02-22 PROBLEM — I71.21 ASCENDING AORTIC ANEURYSM: Chronic | Status: ACTIVE | Noted: 2018-02-22

## 2018-02-22 LAB
ABO + RH BLD: NORMAL
ANION GAP SERPL CALC-SCNC: 13 MMOL/L
BLD GP AB SCN CELLS X3 SERPL QL: NORMAL
BUN SERPL-MCNC: 16 MG/DL
CALCIUM SERPL-MCNC: 10 MG/DL
CHLORIDE SERPL-SCNC: 99 MMOL/L
CHOLEST SERPL-MCNC: 130 MG/DL
CHOLEST/HDLC SERPL: 4.1 {RATIO}
CO2 SERPL-SCNC: 27 MMOL/L
CREAT SERPL-MCNC: 1 MG/DL
EST. GFR  (AFRICAN AMERICAN): >60 ML/MIN/1.73 M^2
EST. GFR  (NON AFRICAN AMERICAN): 53 ML/MIN/1.73 M^2
GLUCOSE SERPL-MCNC: 159 MG/DL
HDLC SERPL-MCNC: 32 MG/DL
HDLC SERPL: 24.6 %
LDLC SERPL CALC-MCNC: 65.8 MG/DL
MAGNESIUM SERPL-MCNC: 1.4 MG/DL
MAGNESIUM SERPL-MCNC: 2.2 MG/DL
NONHDLC SERPL-MCNC: 98 MG/DL
PLATELET RESPONSE PLAVIX: 324 PRU
PLATELET RESPONSE PLAVIX: 324 PRU
POCT GLUCOSE: 166 MG/DL (ref 70–110)
POCT GLUCOSE: 174 MG/DL (ref 70–110)
POCT GLUCOSE: 180 MG/DL (ref 70–110)
POCT GLUCOSE: 224 MG/DL (ref 70–110)
POTASSIUM SERPL-SCNC: 3.9 MMOL/L
SODIUM SERPL-SCNC: 139 MMOL/L
TRIGL SERPL-MCNC: 161 MG/DL

## 2018-02-22 PROCEDURE — 25000003 PHARM REV CODE 250: Performed by: INTERNAL MEDICINE

## 2018-02-22 PROCEDURE — 25000003 PHARM REV CODE 250: Performed by: NURSE PRACTITIONER

## 2018-02-22 PROCEDURE — 36415 COLL VENOUS BLD VENIPUNCTURE: CPT

## 2018-02-22 PROCEDURE — 99152 MOD SED SAME PHYS/QHP 5/>YRS: CPT | Mod: ,,, | Performed by: INTERNAL MEDICINE

## 2018-02-22 PROCEDURE — 99232 SBSQ HOSP IP/OBS MODERATE 35: CPT | Mod: ,,, | Performed by: NURSE PRACTITIONER

## 2018-02-22 PROCEDURE — 63600175 PHARM REV CODE 636 W HCPCS

## 2018-02-22 PROCEDURE — 25000003 PHARM REV CODE 250

## 2018-02-22 PROCEDURE — 25000242 PHARM REV CODE 250 ALT 637 W/ HCPCS: Performed by: INTERNAL MEDICINE

## 2018-02-22 PROCEDURE — 80061 LIPID PANEL: CPT

## 2018-02-22 PROCEDURE — C1894 INTRO/SHEATH, NON-LASER: HCPCS

## 2018-02-22 PROCEDURE — 83735 ASSAY OF MAGNESIUM: CPT

## 2018-02-22 PROCEDURE — 99233 SBSQ HOSP IP/OBS HIGH 50: CPT | Mod: ,,, | Performed by: INTERNAL MEDICINE

## 2018-02-22 PROCEDURE — 85576 BLOOD PLATELET AGGREGATION: CPT

## 2018-02-22 PROCEDURE — 27000221 HC OXYGEN, UP TO 24 HOURS

## 2018-02-22 PROCEDURE — 93455 CORONARY ART/GRFT ANGIO S&I: CPT | Mod: 26,,, | Performed by: INTERNAL MEDICINE

## 2018-02-22 PROCEDURE — 94640 AIRWAY INHALATION TREATMENT: CPT

## 2018-02-22 PROCEDURE — 86901 BLOOD TYPING SEROLOGIC RH(D): CPT

## 2018-02-22 PROCEDURE — 83735 ASSAY OF MAGNESIUM: CPT | Mod: 91

## 2018-02-22 PROCEDURE — S5010 5% DEXTROSE AND 0.45% SALINE: HCPCS | Performed by: INTERNAL MEDICINE

## 2018-02-22 PROCEDURE — 80048 BASIC METABOLIC PNL TOTAL CA: CPT

## 2018-02-22 PROCEDURE — 99152 MOD SED SAME PHYS/QHP 5/>YRS: CPT

## 2018-02-22 PROCEDURE — 63600175 PHARM REV CODE 636 W HCPCS: Performed by: NURSE PRACTITIONER

## 2018-02-22 PROCEDURE — 20600001 HC STEP DOWN PRIVATE ROOM

## 2018-02-22 RX ORDER — SODIUM CHLORIDE 9 MG/ML
INJECTION, SOLUTION INTRAVENOUS CONTINUOUS
Status: ACTIVE | OUTPATIENT
Start: 2018-02-22 | End: 2018-02-22

## 2018-02-22 RX ORDER — ACETAMINOPHEN 325 MG/1
650 TABLET ORAL EVERY 4 HOURS PRN
Status: DISCONTINUED | OUTPATIENT
Start: 2018-02-22 | End: 2018-02-23 | Stop reason: HOSPADM

## 2018-02-22 RX ORDER — ALBUTEROL SULFATE 0.83 MG/ML
2.5 SOLUTION RESPIRATORY (INHALATION) EVERY 6 HOURS PRN
Status: DISCONTINUED | OUTPATIENT
Start: 2018-02-22 | End: 2018-02-23 | Stop reason: HOSPADM

## 2018-02-22 RX ORDER — DIPHENHYDRAMINE HCL 50 MG
50 CAPSULE ORAL ONCE
Status: COMPLETED | OUTPATIENT
Start: 2018-02-22 | End: 2018-02-22

## 2018-02-22 RX ORDER — MAGNESIUM SULFATE HEPTAHYDRATE 40 MG/ML
4 INJECTION, SOLUTION INTRAVENOUS ONCE
Status: COMPLETED | OUTPATIENT
Start: 2018-02-22 | End: 2018-02-22

## 2018-02-22 RX ORDER — ONDANSETRON 8 MG/1
8 TABLET, ORALLY DISINTEGRATING ORAL EVERY 8 HOURS PRN
Status: DISCONTINUED | OUTPATIENT
Start: 2018-02-22 | End: 2018-02-22 | Stop reason: SDUPTHER

## 2018-02-22 RX ORDER — ATORVASTATIN CALCIUM 20 MG/1
80 TABLET, FILM COATED ORAL DAILY
Status: DISCONTINUED | OUTPATIENT
Start: 2018-02-23 | End: 2018-02-23 | Stop reason: HOSPADM

## 2018-02-22 RX ORDER — IPRATROPIUM BROMIDE 0.5 MG/2.5ML
0.5 SOLUTION RESPIRATORY (INHALATION) EVERY 6 HOURS PRN
Status: DISCONTINUED | OUTPATIENT
Start: 2018-02-22 | End: 2018-02-23 | Stop reason: HOSPADM

## 2018-02-22 RX ORDER — DEXTROSE MONOHYDRATE AND SODIUM CHLORIDE 5; .45 G/100ML; G/100ML
INJECTION, SOLUTION INTRAVENOUS CONTINUOUS
Status: DISCONTINUED | OUTPATIENT
Start: 2018-02-22 | End: 2018-02-23 | Stop reason: HOSPADM

## 2018-02-22 RX ADMIN — ACETAMINOPHEN 650 MG: 325 TABLET ORAL at 10:02

## 2018-02-22 RX ADMIN — MAGNESIUM SULFATE HEPTAHYDRATE 2 G: 40 INJECTION, SOLUTION INTRAVENOUS at 09:02

## 2018-02-22 RX ADMIN — METOPROLOL SUCCINATE 100 MG: 100 TABLET, EXTENDED RELEASE ORAL at 09:02

## 2018-02-22 RX ADMIN — FUROSEMIDE 40 MG: 40 TABLET ORAL at 06:02

## 2018-02-22 RX ADMIN — BENZONATATE 100 MG: 100 CAPSULE ORAL at 12:02

## 2018-02-22 RX ADMIN — MAGNESIUM OXIDE TAB 400 MG (241.3 MG ELEMENTAL MG) 800 MG: 400 (241.3 MG) TAB at 10:02

## 2018-02-22 RX ADMIN — INSULIN ASPART 1 UNITS: 100 INJECTION, SOLUTION INTRAVENOUS; SUBCUTANEOUS at 10:02

## 2018-02-22 RX ADMIN — FUROSEMIDE 40 MG: 40 TABLET ORAL at 09:02

## 2018-02-22 RX ADMIN — PANTOPRAZOLE SODIUM 40 MG: 40 TABLET, DELAYED RELEASE ORAL at 09:02

## 2018-02-22 RX ADMIN — DEXTROSE AND SODIUM CHLORIDE: 5; .45 INJECTION, SOLUTION INTRAVENOUS at 09:02

## 2018-02-22 RX ADMIN — MAGNESIUM OXIDE TAB 400 MG (241.3 MG ELEMENTAL MG) 800 MG: 400 (241.3 MG) TAB at 09:02

## 2018-02-22 RX ADMIN — INSULIN DETEMIR 17 UNITS: 100 INJECTION, SOLUTION SUBCUTANEOUS at 09:02

## 2018-02-22 RX ADMIN — BENAZEPRIL HYDROCHLORIDE 10 MG: 10 TABLET, FILM COATED ORAL at 09:02

## 2018-02-22 RX ADMIN — IPRATROPIUM BROMIDE AND ALBUTEROL SULFATE 3 ML: .5; 3 SOLUTION RESPIRATORY (INHALATION) at 07:02

## 2018-02-22 RX ADMIN — DOCUSATE SODIUM 50 MG: 50 CAPSULE, LIQUID FILLED ORAL at 10:02

## 2018-02-22 RX ADMIN — SODIUM CHLORIDE 1000 ML: 0.9 INJECTION, SOLUTION INTRAVENOUS at 06:02

## 2018-02-22 RX ADMIN — INSULIN ASPART 2 UNITS: 100 INJECTION, SOLUTION INTRAVENOUS; SUBCUTANEOUS at 12:02

## 2018-02-22 RX ADMIN — TICAGRELOR 90 MG: 90 TABLET ORAL at 10:02

## 2018-02-22 RX ADMIN — DOCUSATE SODIUM 50 MG: 50 CAPSULE, LIQUID FILLED ORAL at 09:02

## 2018-02-22 RX ADMIN — TICAGRELOR 180 MG: 90 TABLET ORAL at 09:02

## 2018-02-22 RX ADMIN — Medication 1 CAPSULE: at 02:02

## 2018-02-22 RX ADMIN — HYDROCODONE BITARTRATE AND ACETAMINOPHEN 1 TABLET: 5; 325 TABLET ORAL at 11:02

## 2018-02-22 RX ADMIN — ASPIRIN 81 MG: 81 TABLET, COATED ORAL at 09:02

## 2018-02-22 RX ADMIN — DIPHENHYDRAMINE HYDROCHLORIDE 50 MG: 50 CAPSULE ORAL at 01:02

## 2018-02-22 RX ADMIN — INSULIN ASPART 4 UNITS: 100 INJECTION, SOLUTION INTRAVENOUS; SUBCUTANEOUS at 09:02

## 2018-02-22 RX ADMIN — CYANOCOBALAMIN TAB 250 MCG 500 MCG: 250 TAB at 09:02

## 2018-02-22 RX ADMIN — HYDROCODONE BITARTRATE AND ACETAMINOPHEN 1 TABLET: 5; 325 TABLET ORAL at 05:02

## 2018-02-22 RX ADMIN — LEVOTHYROXINE SODIUM 125 MCG: 25 TABLET ORAL at 06:02

## 2018-02-22 RX ADMIN — MONTELUKAST SODIUM 5 MG: 5 TABLET, CHEWABLE ORAL at 10:02

## 2018-02-22 RX ADMIN — DIGOXIN 0.25 MG: 250 TABLET ORAL at 09:02

## 2018-02-22 RX ADMIN — TRAZODONE HYDROCHLORIDE 50 MG: 50 TABLET ORAL at 10:02

## 2018-02-22 RX ADMIN — ESCITALOPRAM 10 MG: 5 TABLET, FILM COATED ORAL at 02:02

## 2018-02-22 NOTE — ASSESSMENT & PLAN NOTE
- I have no documentation, Hayde the TAVR NP had verbal discussions with her surgeon Dr Ramos and Dr. Lozano stated it was 4.8 cm.  We are getting a CTA and LHC today which should reveal size and shape/ location.

## 2018-02-22 NOTE — SUBJECTIVE & OBJECTIVE
Interval History:  Patient without c/o or questions.     Review of Systems   Constitutional: Negative for appetite change.   Respiratory: Positive for shortness of breath. Negative for cough and wheezing.    Cardiovascular: Negative for chest pain, palpitations and leg swelling.   Gastrointestinal: Negative for abdominal pain, constipation, diarrhea and nausea.   Genitourinary: Negative for difficulty urinating.   Musculoskeletal: Negative for back pain.   Skin: Negative.    Neurological: Negative for dizziness, weakness, light-headedness, numbness and headaches.   Psychiatric/Behavioral: Negative for sleep disturbance. The patient is not nervous/anxious.      Objective:     Vital Signs (Most Recent):  Temp: 97.4 °F (36.3 °C) (02/22/18 0743)  Pulse: 94 (02/22/18 0750)  Resp: 20 (02/22/18 0750)  BP: (!) 105/57 (02/22/18 0743)  SpO2: 97 % (02/22/18 0750) Vital Signs (24h Range):  Temp:  [97.3 °F (36.3 °C)-98.3 °F (36.8 °C)] 97.4 °F (36.3 °C)  Pulse:  [72-97] 94  Resp:  [16-20] 20  SpO2:  [94 %-98 %] 97 %  BP: (104-124)/(53-59) 105/57     Weight: 88 kg (194 lb 0.1 oz)  Body mass index is 35.48 kg/m².    Intake/Output Summary (Last 24 hours) at 02/22/18 1102  Last data filed at 02/22/18 0400   Gross per 24 hour   Intake             1200 ml   Output             1675 ml   Net             -475 ml      Physical Exam   Constitutional: She is oriented to person, place, and time. She appears well-developed and well-nourished. No distress.   HENT:   Head: Normocephalic and atraumatic.   Right Ear: External ear normal.   Left Ear: External ear normal.   Nose: Nose normal.   Mouth/Throat: Oropharynx is clear and moist.   Eyes: Conjunctivae and EOM are normal.   Neck: Normal range of motion. Neck supple. No JVD present.   Cardiovascular: Normal rate, regular rhythm and intact distal pulses.    Murmur (systolic high pitched blowing aortic murmer in the aortic position, III/VI) heard.  No audible murmer appreciated     Pulmonary/Chest: Effort normal and breath sounds normal. No respiratory distress. She has no wheezes. She has no rales.        Abdominal: Soft. Bowel sounds are normal. She exhibits no distension and no mass. There is no tenderness. There is no guarding.   Musculoskeletal: Normal range of motion. She exhibits no edema.   Neurological: She is alert and oriented to person, place, and time. No cranial nerve deficit or sensory deficit. Coordination normal.   Skin: Skin is warm and dry. Capillary refill takes less than 2 seconds. No rash noted. She is not diaphoretic. No erythema.   Psychiatric: She has a normal mood and affect. Her behavior is normal. Judgment and thought content normal.   Nursing note and vitals reviewed.      Significant Labs: All pertinent labs within the past 24 hours have been reviewed.    Significant Imaging: Status post cardiac surgery. Mild cardiomegaly. No acute process.

## 2018-02-22 NOTE — HOSPITAL COURSE
Admitted to hospital medicine for TAVR work up for valve in valve as she is s/p bioprosthetic AVR done at [facility] which has since closed.  TAVR team undergoing evaluation. CTS surgery has turned down for SAVR.  Dr. Romero performed LHC, no intervention performed, CTA and PFT's completed. She will f/u with Dr. Romero and they will call her with a f/u appointment once they culminated their data, she is cleared for discharge.

## 2018-02-22 NOTE — PLAN OF CARE
Problem: Patient Care Overview  Goal: Plan of Care Review  Outcome: Ongoing (interventions implemented as appropriate)  Pt transferred from [facility] for TAVR eval. CTS following; Dr. Romero stated pt high risk for SAVR but recs to cont TAVR eval. NPO after MN for angiogram in AM. Plan of care discussed with pt. Pt and family verbalize understanding. Pt remain free from falls/ injury. Will continue to monitor.

## 2018-02-22 NOTE — ASSESSMENT & PLAN NOTE
Nu Lee is a 82 y.o. female referred by Dr Hooper for evaluation of severe AS (NYHA Class III sx). Op note from CABGx1/AVR/Asc Ao repair by Dr. Ramos in 2009 obtained from Dr. Su.     she has undergone the following Delisa TAVR work-up:   ECHO (Date 2/21/2018): CHARLOTTE= 0.8 cm2, iAVA 0.42 cm2/m2, MG= 60 mmHg, Peak Tyler= 4.7m/s, EF= 55%.   S/p AVR (25mm Magna) by Dr. Ramos 2009: True ID 23mm   [facility] (Date): Needs   STS: 7.4%   Frailty: 2/4   Iliacs are pending  LVOT area by CTA is pending   CTS risk assessment: High risk for due to STS score, age, redo status and co-morbidities per Dr. Bueno   PFTs: Needs       Will need the following to complete Delisa TAVR work-up:  -TAVR CTA ordered   -Coronary Angiogram +/- PCI per Dr. Romero today  - Risks, Benefits, and alternatives of the procedure were discussed in detail with the patient by Dr. White. Questions were answered and patient has voiced understanding. He is agreeable to proceed. Informed consent obtained. Plavix d/c'd as PRU is 324 and have loaded brilinta.   -PFTs (ordered)

## 2018-02-22 NOTE — ASSESSMENT & PLAN NOTE
-  S/p prosthetic valve with severe AS, valve area 0.61, unknown type of valve at present time.  - TAVR team eval underway  - Need to obtain recent LHC/ Echo's  - repeated echo since no documents or CD's upon transfer   - due for angiogram today, loaded with plavix, platelet assay not inhibitory, changed to brilinta for now, awaiting results of LHC  - Due for CTA as well.

## 2018-02-22 NOTE — HPI
81 y/o F with a PMH of HTN, HLD DM2, CKD II/III, chronic A fib s/p pacemaker, DVT, CAD s/p CABG, Aortic stenosis s/p prosthetic valve replacement 2009, GERD, and arthritis.  She presented with chest pain to [facility] 2/19/18, described as pressure like sensation, associated with SOB.  No changes on EKG. Troponins mildly elevated and flat (at her baseline). Echo showed Aortic valve area 0.61cm. Dr. Romero with cards was consulted for transfer for TAVR evaluation, and accepted patient. Patient currently without dyspnea, or chest pain.

## 2018-02-22 NOTE — SUBJECTIVE & OBJECTIVE
Interval History: Patient just back from echo, is understanding plan and is looking forward to work up.  Patient without c/o or questions.     Review of Systems   Constitutional: Positive for activity change. Negative for appetite change, chills, fatigue and fever.   HENT: Negative for congestion, sore throat and trouble swallowing.    Respiratory: Positive for shortness of breath. Negative for cough and wheezing.    Cardiovascular: Positive for chest pain. Negative for palpitations and leg swelling.   Gastrointestinal: Negative for abdominal pain, constipation, diarrhea and nausea.   Genitourinary: Negative for decreased urine volume, difficulty urinating and hematuria.   Neurological: Negative for dizziness, weakness, light-headedness, numbness and headaches.   Hematological: Does not bruise/bleed easily.   Psychiatric/Behavioral: Negative for sleep disturbance. The patient is not nervous/anxious.      Objective:     Vital Signs (Most Recent):  Temp: 98 °F (36.7 °C) (02/21/18 2030)  Pulse: 88 (02/21/18 2056)  Resp: 16 (02/21/18 2056)  BP: (!) 104/55 (02/21/18 2030)  SpO2: 98 % (02/21/18 2056) Vital Signs (24h Range):  Temp:  [96.6 °F (35.9 °C)-98.3 °F (36.8 °C)] 98 °F (36.7 °C)  Pulse:  [71-93] 88  Resp:  [16-20] 16  SpO2:  [92 %-99 %] 98 %  BP: (104-124)/(53-59) 104/55     Weight: 88 kg (194 lb 0.1 oz)  Body mass index is 35.48 kg/m².    Intake/Output Summary (Last 24 hours) at 02/21/18 2214  Last data filed at 02/21/18 1300   Gross per 24 hour   Intake             1020 ml   Output              650 ml   Net              370 ml      Physical Exam   Constitutional: She is oriented to person, place, and time. She appears well-developed and well-nourished. No distress.   HENT:   Head: Normocephalic and atraumatic.   Right Ear: External ear normal.   Left Ear: External ear normal.   Nose: Nose normal.   Eyes: Conjunctivae and EOM are normal.   Neck: Normal range of motion. Neck supple. No JVD present.    Cardiovascular: Normal rate, regular rhythm, normal heart sounds and intact distal pulses.    No murmur heard.  No audible murmer appreciated    Pulmonary/Chest: Effort normal. No respiratory distress. She has no wheezes. She has no rales.   Diminished breath sounds     Abdominal: Soft. Bowel sounds are normal. She exhibits no distension and no mass. There is no tenderness. There is no guarding.   Musculoskeletal: Normal range of motion. She exhibits no edema.   Neurological: She is alert and oriented to person, place, and time. No cranial nerve deficit or sensory deficit. Coordination normal.   Skin: Skin is warm and dry. Capillary refill takes less than 2 seconds. No rash noted. She is not diaphoretic. No erythema.   Psychiatric: She has a normal mood and affect. Her behavior is normal. Judgment and thought content normal.   Nursing note and vitals reviewed.      Significant Labs: All pertinent labs within the past 24 hours have been reviewed.    Significant Imaging: Status post cardiac surgery. Mild cardiomegaly. No acute process.

## 2018-02-22 NOTE — SUBJECTIVE & OBJECTIVE
Interval History: No acute events overnight. NPO for LHC +/- PCI with Dr. Romero and TAVR CTA today.     Objective:     Vital Signs (Most Recent):  Temp: 97.4 °F (36.3 °C) (02/22/18 0743)  Pulse: 94 (02/22/18 0750)  Resp: 20 (02/22/18 0750)  BP: (!) 105/57 (02/22/18 0743)  SpO2: 97 % (02/22/18 0750) Vital Signs (24h Range):  Temp:  [97.3 °F (36.3 °C)-98.3 °F (36.8 °C)] 97.4 °F (36.3 °C)  Pulse:  [72-97] 94  Resp:  [16-20] 20  SpO2:  [94 %-99 %] 97 %  BP: (104-124)/(53-59) 105/57     Weight: 88 kg (194 lb 0.1 oz)  Body mass index is 35.48 kg/m².    SpO2: 97 %  O2 Device (Oxygen Therapy): nasal cannula      Intake/Output Summary (Last 24 hours) at 02/22/18 0844  Last data filed at 02/22/18 0400   Gross per 24 hour   Intake             1200 ml   Output             1675 ml   Net             -475 ml       Lines/Drains/Airways     Peripheral Intravenous Line                 Peripheral IV - Single Lumen 02/20/18 1200 Left Antecubital 1 day         Peripheral IV - Single Lumen 02/22/18 0815 Left Wrist less than 1 day         Peripheral IV - Single Lumen 02/22/18 0829 Left Wrist less than 1 day                Physical Exam   Constitutional: She is oriented to person, place, and time. She appears well-developed and well-nourished. No distress.   HENT:   Head: Normocephalic and atraumatic.   Mouth/Throat: Oropharynx is clear and moist.   Eyes: Conjunctivae and EOM are normal. Pupils are equal, round, and reactive to light. No scleral icterus.   Neck: Neck supple. No JVD present. No tracheal deviation present.   Cardiovascular: Normal rate and regular rhythm.  Exam reveals no gallop and no friction rub.    Murmur heard.  Pulmonary/Chest: Effort normal. No respiratory distress. She has no wheezes. She has rales. She exhibits no tenderness.   On supplemental O2 via NC    Abdominal: Soft. Bowel sounds are normal. She exhibits no distension. There is no hepatosplenomegaly. There is no tenderness.   Musculoskeletal: She exhibits no  edema or tenderness.   Neurological: She is alert and oriented to person, place, and time.   Skin: Skin is warm and dry. No rash noted. No erythema.   Psychiatric: She has a normal mood and affect. Her behavior is normal.       Significant Labs:   BMP:   Recent Labs  Lab 02/20/18 1957 02/22/18 0357   * 159*    139   K 4.0 3.9    99   CO2 26 27   BUN 15 16   CREATININE 1.0 1.0   CALCIUM 9.5 10.0   MG  --  1.4*   , CMP   Recent Labs  Lab 02/20/18 1957 02/22/18 0357    139   K 4.0 3.9    99   CO2 26 27   * 159*   BUN 15 16   CREATININE 1.0 1.0   CALCIUM 9.5 10.0   PROT 6.7  --    ALBUMIN 3.2*  --    BILITOT 0.8  --    ALKPHOS 65  --    AST 41*  --    ALT 23  --    ANIONGAP 10 13   ESTGFRAFRICA >60.0 >60.0   EGFRNONAA 53.0* 53.0*   , CBC   Recent Labs  Lab 02/20/18 1957   WBC 7.22   HGB 10.9*   HCT 32.0*       and Troponin   Recent Labs  Lab 02/21/18  0511   TROPONINI 0.035*       Significant Imaging: Echocardiogram:   2D echo with color flow doppler:   Results for orders placed or performed during the hospital encounter of 02/20/18   2D echo with color flow doppler   Result Value Ref Range    EF 55 55 - 65    Mitral Valve Regurgitation TRIVIAL     Aortic Valve Regurgitation MILD     Est. PA Systolic Pressure 21.15     Tricuspid Valve Regurgitation MILD

## 2018-02-22 NOTE — ASSESSMENT & PLAN NOTE
- on apixaban 2.5 bid however only meets one of the three criteria > 80 yrs, CR normal, weight 88kg, post procedure will resume at regular 5mg bid dose  - afib controlled rate, incomplete RBBB

## 2018-02-22 NOTE — ASSESSMENT & PLAN NOTE
4.8 cm per patient, TAVR CTA today to better evaluate  S/p Aortic wrap repair in 2009 by Dr. Ramos

## 2018-02-22 NOTE — PROGRESS NOTES
[facility]  Interventional Cardiology  Progress Note    Patient Name: Nu Lee  MRN: [id number]  Admission Date: 2/20/2018  Hospital Length of Stay: 2 days  Code Status: Full Code   Attending Physician: Katya Charles MD   Primary Care Physician: Michela Christian MD  Principal Problem:Nonrheumatic aortic valve stenosis    Subjective:     Interval History: No acute events overnight. NPO for LHC +/- PCI with Dr. Romero and TAVR CTA today.     Objective:     Vital Signs (Most Recent):  Temp: 97.4 °F (36.3 °C) (02/22/18 0743)  Pulse: 94 (02/22/18 0750)  Resp: 20 (02/22/18 0750)  BP: (!) 105/57 (02/22/18 0743)  SpO2: 97 % (02/22/18 0750) Vital Signs (24h Range):  Temp:  [97.3 °F (36.3 °C)-98.3 °F (36.8 °C)] 97.4 °F (36.3 °C)  Pulse:  [72-97] 94  Resp:  [16-20] 20  SpO2:  [94 %-99 %] 97 %  BP: (104-124)/(53-59) 105/57     Weight: 88 kg (194 lb 0.1 oz)  Body mass index is 35.48 kg/m².    SpO2: 97 %  O2 Device (Oxygen Therapy): nasal cannula      Intake/Output Summary (Last 24 hours) at 02/22/18 0844  Last data filed at 02/22/18 0400   Gross per 24 hour   Intake             1200 ml   Output             1675 ml   Net             -475 ml       Lines/Drains/Airways     Peripheral Intravenous Line                 Peripheral IV - Single Lumen 02/20/18 1200 Left Antecubital 1 day         Peripheral IV - Single Lumen 02/22/18 0815 Left Wrist less than 1 day         Peripheral IV - Single Lumen 02/22/18 0829 Left Wrist less than 1 day                Physical Exam   Constitutional: She is oriented to person, place, and time. She appears well-developed and well-nourished. No distress.   HENT:   Head: Normocephalic and atraumatic.   Mouth/Throat: Oropharynx is clear and moist.   Eyes: Conjunctivae and EOM are normal. Pupils are equal, round, and reactive to light. No scleral icterus.   Neck: Neck supple. No JVD present. No tracheal deviation present.   Cardiovascular: Normal rate and regular rhythm.   Exam reveals no gallop and no friction rub.    Murmur heard.  Pulmonary/Chest: Effort normal. No respiratory distress. She has no wheezes. She has rales. She exhibits no tenderness.   On supplemental O2 via NC    Abdominal: Soft. Bowel sounds are normal. She exhibits no distension. There is no hepatosplenomegaly. There is no tenderness.   Musculoskeletal: She exhibits no edema or tenderness.   Neurological: She is alert and oriented to person, place, and time.   Skin: Skin is warm and dry. No rash noted. No erythema.   Psychiatric: She has a normal mood and affect. Her behavior is normal.       Significant Labs:   BMP:   Recent Labs  Lab 02/20/18 1957 02/22/18 0357   * 159*    139   K 4.0 3.9    99   CO2 26 27   BUN 15 16   CREATININE 1.0 1.0   CALCIUM 9.5 10.0   MG  --  1.4*   , CMP   Recent Labs  Lab 02/20/18 1957 02/22/18 0357    139   K 4.0 3.9    99   CO2 26 27   * 159*   BUN 15 16   CREATININE 1.0 1.0   CALCIUM 9.5 10.0   PROT 6.7  --    ALBUMIN 3.2*  --    BILITOT 0.8  --    ALKPHOS 65  --    AST 41*  --    ALT 23  --    ANIONGAP 10 13   ESTGFRAFRICA >60.0 >60.0   EGFRNONAA 53.0* 53.0*   , CBC   Recent Labs  Lab 02/20/18 1957   WBC 7.22   HGB 10.9*   HCT 32.0*       and Troponin   Recent Labs  Lab 02/21/18  0511   TROPONINI 0.035*       Significant Imaging: Echocardiogram:   2D echo with color flow doppler:   Results for orders placed or performed during the hospital encounter of 02/20/18   2D echo with color flow doppler   Result Value Ref Range    EF 55 55 - 65    Mitral Valve Regurgitation TRIVIAL     Aortic Valve Regurgitation MILD     Est. PA Systolic Pressure 21.15     Tricuspid Valve Regurgitation MILD      Assessment and Plan:     Patient is a 82 y.o. female presenting with:    * Nonrheumatic severe aortic valve stenosis s/p prosthetic avr 2009    Nu Lee is a 82 y.o. female referred by Dr Hooper for evaluation of severe AS (NYHA  Class III sx). Op note from CABGx1/AVR/Asc Ao repair by Dr. Ramos in 2009 obtained from Dr. Su.     she has undergone the following Delisa TAVR work-up:   ECHO (Date 2/21/2018): CHARLOTTE= 0.8 cm2, iAVA 0.42 cm2/m2, MG= 60 mmHg, Peak Tyler= 4.7m/s, EF= 55%.   S/p AVR (25mm Magna) by Dr. Ramos 2009: True ID 23mm   [facility] (Date): Needs   STS: 7.4%   Frailty: 2/4   Iliacs are pending  LVOT area by CTA is pending   CTS risk assessment: High risk for due to STS score, age, redo status and co-morbidities per Dr. Bueno   PFTs: Needs       Will need the following to complete Delisa TAVR work-up:  -TAVR CTA ordered   -Coronary Angiogram +/- PCI per Dr. Romero today  - Risks, Benefits, and alternatives of the procedure were discussed in detail with the patient by Dr. White. Questions were answered and patient has voiced understanding. He is agreeable to proceed. Informed consent obtained. Plavix d/c'd as PRU is 324 and have loaded brilinta.   -PFTs (ordered)                Ascending aortic aneurysm    4.8 cm per patient, TAVR CTA today to better evaluate  S/p Aortic wrap repair in 2009 by Dr. Ramos        Hx of CABG    S/p CABGx1 (SVG-RCA) 2009           H/O prosthetic aortic valve replacement    S/p 25mm Magna (2009)             VTE Risk Mitigation     None        PLAN:  -Op note obtained from prior cardiologist, Dr. Su, and scanned into Epic   -Eliquis held for [facility] +/- PCI today with Dr. Romero  -Plavix d/c'd as  with brilinta loaded and BID  -TAVR CTA ordered  -PFTs ordered  -NPO      Hayde Frank NP  Interventional Cardiology  [facility]

## 2018-02-22 NOTE — PROGRESS NOTES
[facility] Hospital Medicine  Progress Note    Patient Name: Nu Lee  MRN: [id number]  Patient Class: IP- Inpatient   Admission Date: 2/20/2018  Length of Stay: 1 days  Attending Physician: Katya Charles MD  Primary Care Provider: Michela Christian MD    [facility] MED J Suzette Mike NP    Subjective:     Principal Problem:<principal problem not specified>    HPI:  81 y/o F with a PMH of HTN, HLD DM2, CKD II/III, chronic A fib s/p pacemaker, DVT, CAD s/p CABG, Aortic stenosis s/p prosthetic valve replacement 2009, GERD, and arthritis.  She presented with chest pain to [facility] 2/19/18, described as pressure like sensation, associated with SOB.  No changes on EKG. Troponins mildly elevated and flat (at her baseline). Echo showed Aortic valve area 0.61cm. Dr. Romero with cards was consulted for transfer for TAVR evaluation, and accepted patient. Patient currently without dyspnea, or chest pain.     Hospital Course:  Admitted to hospital medicine for TAVR work up for valve in valve as she is s/p prosthetic AVR from [facility].  TAVR team undergoing evaluation. CTS surgery has turned down for SAVR.  Planning [facility] per Dr. Romero +/- pci, pft's ordered.     Interval History: Patient just back from echo, is understanding plan and is looking forward to work up.  Patient without c/o or questions.     Review of Systems   Constitutional: Positive for activity change. Negative for appetite change, chills, fatigue and fever.   HENT: Negative for congestion, sore throat and trouble swallowing.    Respiratory: Positive for shortness of breath. Negative for cough and wheezing.    Cardiovascular: Positive for chest pain. Negative for palpitations and leg swelling.   Gastrointestinal: Negative for abdominal pain, constipation, diarrhea and nausea.   Genitourinary: Negative for decreased urine volume, difficulty urinating and hematuria.   Neurological: Negative for  dizziness, weakness, light-headedness, numbness and headaches.   Hematological: Does not bruise/bleed easily.   Psychiatric/Behavioral: Negative for sleep disturbance. The patient is not nervous/anxious.      Objective:     Vital Signs (Most Recent):  Temp: 98 °F (36.7 °C) (02/21/18 2030)  Pulse: 88 (02/21/18 2056)  Resp: 16 (02/21/18 2056)  BP: (!) 104/55 (02/21/18 2030)  SpO2: 98 % (02/21/18 2056) Vital Signs (24h Range):  Temp:  [96.6 °F (35.9 °C)-98.3 °F (36.8 °C)] 98 °F (36.7 °C)  Pulse:  [71-93] 88  Resp:  [16-20] 16  SpO2:  [92 %-99 %] 98 %  BP: (104-124)/(53-59) 104/55     Weight: 88 kg (194 lb 0.1 oz)  Body mass index is 35.48 kg/m².    Intake/Output Summary (Last 24 hours) at 02/21/18 2214  Last data filed at 02/21/18 1300   Gross per 24 hour   Intake             1020 ml   Output              650 ml   Net              370 ml      Physical Exam   Constitutional: She is oriented to person, place, and time. She appears well-developed and well-nourished. No distress.   HENT:   Head: Normocephalic and atraumatic.   Right Ear: External ear normal.   Left Ear: External ear normal.   Nose: Nose normal.   Eyes: Conjunctivae and EOM are normal.   Neck: Normal range of motion. Neck supple. No JVD present.   Cardiovascular: Normal rate, regular rhythm, normal heart sounds and intact distal pulses.    No murmur heard.  No audible murmer appreciated    Pulmonary/Chest: Effort normal. No respiratory distress. She has no wheezes. She has no rales.   Diminished breath sounds     Abdominal: Soft. Bowel sounds are normal. She exhibits no distension and no mass. There is no tenderness. There is no guarding.   Musculoskeletal: Normal range of motion. She exhibits no edema.   Neurological: She is alert and oriented to person, place, and time. No cranial nerve deficit or sensory deficit. Coordination normal.   Skin: Skin is warm and dry. Capillary refill takes less than 2 seconds. No rash noted. She is not diaphoretic. No  erythema.   Psychiatric: She has a normal mood and affect. Her behavior is normal. Judgment and thought content normal.   Nursing note and vitals reviewed.      Significant Labs: All pertinent labs within the past 24 hours have been reviewed.    Significant Imaging: Status post cardiac surgery. Mild cardiomegaly. No acute process.    Assessment/Plan:      Type 2 diabetes mellitus with renal complication    - CKD II/III  - monitor daily renal panel  - avoid nephrotoxic agents.  - CR baseline 1.0          Pacemaker 2007    - will need to be interrogated as outpatient if not done recently prior to TAVR          Hx of CABG    - cad    - planning LHC in am +/- PCI          H/O prosthetic aortic valve replacement    - researching size/ device  - TAVR team has contacted Dr. Lozano          Gastroesophageal reflux disease    - protonix          Atrial fibrillation    - on apixaban 2.5 bid however only meets one of the three criteria > 80 yrs, CR normal, weight 88kg  - afib controlled rate, incomplete RBBB          Other hyperlipidemia    - check lipid panel, in interim cont simvastatin for now.          Essential hypertension    - benazepril, metoprolol, lasix, b/p controlled          Nonrheumatic severe aortic valve stenosis s/p prosthetic avr 2009    -  S/p prosthetic valve with severe AS, valve area 0.61, unknown type of valve at present time.  - TAVR team eval underway  - Need to obtain recent LHC/ Echo's  - repeated echo since no documents or CD's upon transfer   - planning angiogram in am, loaded with plavix, check platelet assay in am.           VTE Risk Mitigation     None              Suzette Mike NP  Department of Hospital Medicine   [facility]

## 2018-02-22 NOTE — ASSESSMENT & PLAN NOTE
- on apixaban 2.5 bid however only meets one of the three criteria > 80 yrs, CR normal, weight 88kg  - afib controlled rate, incomplete RBBB

## 2018-02-22 NOTE — ASSESSMENT & PLAN NOTE
-  S/p prosthetic valve with severe AS, valve area 0.61, unknown type of valve at present time.  - TAVR team eval underway  - Need to obtain recent LHC/ Echo's  - repeated echo since no documents or CD's upon transfer   - planning angiogram in am, loaded with plavix, check platelet assay in am.

## 2018-02-22 NOTE — PROCEDURES
Procedure performed: Coronary Angiogram  Access: R CFA, 6Fr sheath  Findings: no coronary disease, SVG to non-dominant RCA without disease  PCI: no  Complications: none  Blood loss: minimal, < 10 cc  Closure: Collagen plug, hemostasis achieved, femoral pulse 2+, distally perfused  Activity restrictions: 2 hr flat, 6 hr bedrest  Continue TAVR w/u inpatient, CTA pending    Thomas Manley MD  PGY-4 Cardiology Fellow

## 2018-02-23 VITALS
TEMPERATURE: 98 F | OXYGEN SATURATION: 98 % | SYSTOLIC BLOOD PRESSURE: 116 MMHG | HEIGHT: 62 IN | RESPIRATION RATE: 16 BRPM | BODY MASS INDEX: 35.7 KG/M2 | DIASTOLIC BLOOD PRESSURE: 58 MMHG | WEIGHT: 194 LBS | HEART RATE: 69 BPM

## 2018-02-23 LAB
ALBUMIN SERPL BCP-MCNC: 3.3 G/DL
ALP SERPL-CCNC: 69 U/L
ALT SERPL W/O P-5'-P-CCNC: 20 U/L
ANION GAP SERPL CALC-SCNC: 14 MMOL/L
ANION GAP SERPL CALC-SCNC: 14 MMOL/L
AST SERPL-CCNC: 34 U/L
BASOPHILS # BLD AUTO: 0.08 K/UL
BASOPHILS NFR BLD: 0.9 %
BILIRUB SERPL-MCNC: 0.6 MG/DL
BUN SERPL-MCNC: 19 MG/DL
BUN SERPL-MCNC: 19 MG/DL
CALCIUM SERPL-MCNC: 9.4 MG/DL
CALCIUM SERPL-MCNC: 9.4 MG/DL
CHLORIDE SERPL-SCNC: 99 MMOL/L
CHLORIDE SERPL-SCNC: 99 MMOL/L
CO2 SERPL-SCNC: 23 MMOL/L
CO2 SERPL-SCNC: 23 MMOL/L
CREAT SERPL-MCNC: 1.1 MG/DL
CREAT SERPL-MCNC: 1.1 MG/DL
DIFFERENTIAL METHOD: ABNORMAL
EOSINOPHIL # BLD AUTO: 0.2 K/UL
EOSINOPHIL NFR BLD: 2.2 %
ERYTHROCYTE [DISTWIDTH] IN BLOOD BY AUTOMATED COUNT: 15.7 %
EST. GFR  (AFRICAN AMERICAN): 54.4 ML/MIN/1.73 M^2
EST. GFR  (AFRICAN AMERICAN): 54.4 ML/MIN/1.73 M^2
EST. GFR  (NON AFRICAN AMERICAN): 47.2 ML/MIN/1.73 M^2
EST. GFR  (NON AFRICAN AMERICAN): 47.2 ML/MIN/1.73 M^2
GLUCOSE SERPL-MCNC: 189 MG/DL
GLUCOSE SERPL-MCNC: 189 MG/DL
HCT VFR BLD AUTO: 33.9 %
HGB BLD-MCNC: 11.4 G/DL
IMM GRANULOCYTES # BLD AUTO: 0.04 K/UL
IMM GRANULOCYTES NFR BLD AUTO: 0.5 %
LYMPHOCYTES # BLD AUTO: 1.5 K/UL
LYMPHOCYTES NFR BLD: 17.3 %
MAGNESIUM SERPL-MCNC: 2 MG/DL
MCH RBC QN AUTO: 29.8 PG
MCHC RBC AUTO-ENTMCNC: 33.6 G/DL
MCV RBC AUTO: 89 FL
MONOCYTES # BLD AUTO: 0.7 K/UL
MONOCYTES NFR BLD: 8.2 %
NEUTROPHILS # BLD AUTO: 6.2 K/UL
NEUTROPHILS NFR BLD: 70.9 %
NRBC BLD-RTO: 0 /100 WBC
PLATELET # BLD AUTO: 196 K/UL
PMV BLD AUTO: 12.1 FL
POCT GLUCOSE: 146 MG/DL (ref 70–110)
POCT GLUCOSE: 173 MG/DL (ref 70–110)
POTASSIUM SERPL-SCNC: 3.9 MMOL/L
POTASSIUM SERPL-SCNC: 3.9 MMOL/L
PROT SERPL-MCNC: 6.9 G/DL
RBC # BLD AUTO: 3.82 M/UL
SODIUM SERPL-SCNC: 136 MMOL/L
SODIUM SERPL-SCNC: 136 MMOL/L
WBC # BLD AUTO: 8.69 K/UL

## 2018-02-23 PROCEDURE — 94729 DIFFUSING CAPACITY: CPT

## 2018-02-23 PROCEDURE — 82803 BLOOD GASES ANY COMBINATION: CPT

## 2018-02-23 PROCEDURE — 25000003 PHARM REV CODE 250: Performed by: INTERNAL MEDICINE

## 2018-02-23 PROCEDURE — 94010 BREATHING CAPACITY TEST: CPT | Performed by: INTERNAL MEDICINE

## 2018-02-23 PROCEDURE — 36600 WITHDRAWAL OF ARTERIAL BLOOD: CPT

## 2018-02-23 PROCEDURE — 25500020 PHARM REV CODE 255: Performed by: HOSPITALIST

## 2018-02-23 PROCEDURE — 85025 COMPLETE CBC W/AUTO DIFF WBC: CPT

## 2018-02-23 PROCEDURE — 80053 COMPREHEN METABOLIC PANEL: CPT

## 2018-02-23 PROCEDURE — 36415 COLL VENOUS BLD VENIPUNCTURE: CPT

## 2018-02-23 PROCEDURE — 25000003 PHARM REV CODE 250: Performed by: NURSE PRACTITIONER

## 2018-02-23 PROCEDURE — 83735 ASSAY OF MAGNESIUM: CPT

## 2018-02-23 PROCEDURE — 99239 HOSP IP/OBS DSCHRG MGMT >30: CPT | Mod: ,,, | Performed by: NURSE PRACTITIONER

## 2018-02-23 RX ORDER — METOPROLOL SUCCINATE 100 MG/1
100 TABLET, EXTENDED RELEASE ORAL DAILY
Start: 2018-02-23

## 2018-02-23 RX ORDER — POLYETHYLENE GLYCOL 3350 17 G/17G
17 POWDER, FOR SOLUTION ORAL ONCE
Status: COMPLETED | OUTPATIENT
Start: 2018-02-23 | End: 2018-02-23

## 2018-02-23 RX ORDER — ESCITALOPRAM OXALATE 20 MG/1
10 TABLET ORAL DAILY
Status: ON HOLD
Start: 2018-02-23 | End: 2024-03-06 | Stop reason: HOSPADM

## 2018-02-23 RX ADMIN — INSULIN DETEMIR 17 UNITS: 100 INJECTION, SOLUTION SUBCUTANEOUS at 09:02

## 2018-02-23 RX ADMIN — BENZONATATE 100 MG: 100 CAPSULE ORAL at 11:02

## 2018-02-23 RX ADMIN — MAGNESIUM OXIDE TAB 400 MG (241.3 MG ELEMENTAL MG) 800 MG: 400 (241.3 MG) TAB at 09:02

## 2018-02-23 RX ADMIN — FUROSEMIDE 40 MG: 40 TABLET ORAL at 09:02

## 2018-02-23 RX ADMIN — LEVOTHYROXINE SODIUM 125 MCG: 25 TABLET ORAL at 05:02

## 2018-02-23 RX ADMIN — PANTOPRAZOLE SODIUM 40 MG: 40 TABLET, DELAYED RELEASE ORAL at 09:02

## 2018-02-23 RX ADMIN — CYANOCOBALAMIN TAB 250 MCG 500 MCG: 250 TAB at 09:02

## 2018-02-23 RX ADMIN — INSULIN ASPART 6 UNITS: 100 INJECTION, SOLUTION INTRAVENOUS; SUBCUTANEOUS at 01:02

## 2018-02-23 RX ADMIN — HYDROCODONE BITARTRATE AND ACETAMINOPHEN 1 TABLET: 5; 325 TABLET ORAL at 05:02

## 2018-02-23 RX ADMIN — POLYETHYLENE GLYCOL 3350 17 G: 17 POWDER, FOR SOLUTION ORAL at 01:02

## 2018-02-23 RX ADMIN — DIGOXIN 0.25 MG: 250 TABLET ORAL at 09:02

## 2018-02-23 RX ADMIN — ATORVASTATIN CALCIUM 80 MG: 20 TABLET, FILM COATED ORAL at 09:02

## 2018-02-23 RX ADMIN — Medication 1 CAPSULE: at 09:02

## 2018-02-23 RX ADMIN — IOHEXOL 100 ML: 350 INJECTION, SOLUTION INTRAVENOUS at 10:02

## 2018-02-23 RX ADMIN — ASPIRIN 81 MG: 81 TABLET, COATED ORAL at 09:02

## 2018-02-23 RX ADMIN — TICAGRELOR 90 MG: 90 TABLET ORAL at 09:02

## 2018-02-23 RX ADMIN — APIXABAN 5 MG: 5 TABLET, FILM COATED ORAL at 05:02

## 2018-02-23 RX ADMIN — ESCITALOPRAM 10 MG: 5 TABLET, FILM COATED ORAL at 09:02

## 2018-02-23 RX ADMIN — HYDROCODONE BITARTRATE AND ACETAMINOPHEN 1 TABLET: 5; 325 TABLET ORAL at 11:02

## 2018-02-23 RX ADMIN — DOCUSATE SODIUM 50 MG: 50 CAPSULE, LIQUID FILLED ORAL at 09:02

## 2018-02-23 RX ADMIN — METOPROLOL SUCCINATE 100 MG: 100 TABLET, EXTENDED RELEASE ORAL at 09:02

## 2018-02-23 RX ADMIN — BENAZEPRIL HYDROCHLORIDE 10 MG: 10 TABLET, FILM COATED ORAL at 09:02

## 2018-02-23 NOTE — PROGRESS NOTES
Pt returned to room. Pt stable, no complaints of pain or signs of distress. Bed in lowest position, call bell within reach, and siderails up x2. Will continue to monitor pt.

## 2018-02-23 NOTE — DISCHARGE SUMMARY
[facility] Hospital Medicine  Discharge Summary      Patient Name: Nu Lee  MRN: [id number]  Admission Date: 2/20/2018  Hospital Length of Stay: 3 days  Discharge Date and Time:  02/23/2018 5:17 PM  Attending Physician: Katya Charles MD   Discharging Provider: Suzette Mike NP  Primary Care Provider: Michela Christian MD  [facility] Medicine Team: [facility] J Suzette Mike NP    HPI:   83 y/o F with a PMH of HTN, HLD DM2, CKD II/III, chronic A fib s/p pacemaker, DVT, CAD s/p CABG, Aortic stenosis s/p prosthetic valve replacement 2009, GERD, and arthritis.  She presented with chest pain to [facility] 2/19/18, described as pressure like sensation, associated with SOB.  No changes on EKG. Troponins mildly elevated and flat (at her baseline). Echo showed Aortic valve area 0.61cm. Dr. Romero with cards was consulted for transfer for TAVR evaluation, and accepted patient. Patient currently without dyspnea, or chest pain.     Procedure(s) (LRB):  Left heart cath (Left)      Hospital Course:   Admitted to hospital medicine for TAVR work up for valve in valve as she is s/p bioprosthetic AVR done at [facility] which has since closed.  TAVR team undergoing evaluation. CTS surgery has turned down for SAVR.  Dr. Romero performed LHC, no intervention performed, CTA and PFT's completed. She will f/u with Dr. Romero and they will call her with a f/u appointment once they culminated their data, she is cleared for discharge.      Consults:   Consults         Status Ordering Provider     Inpatient consult to Interventional Cardiology  Once     Provider:  Ovidio Romero MD    Completed SUZETTE MIKE      Review of Systems   Constitutional: Negative for appetite change.   Respiratory: Positive for shortness of breath. Negative for cough and wheezing.    Cardiovascular: Negative for chest pain, palpitations and leg swelling.   Gastrointestinal: Negative for abdominal  pain, constipation, diarrhea and nausea.   Genitourinary: Negative for difficulty urinating.   Musculoskeletal: Negative for back pain.   Skin: Negative.    Neurological: Negative for dizziness, weakness, light-headedness, numbness and headaches.   Psychiatric/Behavioral: Negative for sleep disturbance. The patient is not nervous/anxious.      Physical Exam   Constitutional: She is oriented to person, place, and time. She appears well-developed and well-nourished. No distress.   HENT:   Head: Normocephalic and atraumatic.   Right Ear: External ear normal.   Left Ear: External ear normal.   Nose: Nose normal.   Mouth/Throat: Oropharynx is clear and moist.   Eyes: Conjunctivae and EOM are normal.   Neck: Normal range of motion. Neck supple. No JVD present.   Cardiovascular: Normal rate, regular rhythm and intact distal pulses.    Murmur (systolic high pitched blowing aortic murmer in the aortic position, III/VI) heard.  No audible murmer appreciated    Pulmonary/Chest: Effort normal and breath sounds normal. No respiratory distress. She has no wheezes. She has no rales.   Abdominal: Soft. Bowel sounds are normal. She exhibits no distension and no mass. There is no tenderness. There is no guarding.   Musculoskeletal: Normal range of motion. She exhibits no edema.   Neurological: She is alert and oriented to person, place, and time. No cranial nerve deficit or sensory deficit. Coordination normal.   Skin: Skin is warm and dry. Capillary refill takes less than 2 seconds. No rash noted. She is not diaphoretic. No erythema.   Psychiatric: She has a normal mood and affect. Her behavior is normal. Judgment and thought content normal.       * Aortic valve stenosis    -  S/p bioprosthetic valve with severe AS, valve area 0.61, unknown type of valve at present time.  - TAVR team eval underway  - repeated echo since no documents or CD's upon transfer   - s/p CTA and PFT's on discharge  CTA performed for procedural purposes with  "relative vascular dimensions as above.  Cardiomegaly and dense aortic valve calcification.  Interlobular septal thickening and patchy ground glass density, compatible with pulmonary edema.  Colonic diverticulosis without evidence of acute diverticulitis.  Status post hysterectomy.  Aortic bifurcation 1.1 cm   Right common iliac artery 0.9 cm  Right external iliac artery 0.6 cm  Right common femoral artery 0.7 cm  Left common iliac artery 0.7 cm  Left external iliac artery 0.7 cm  Left common femoral artery 0.7 cm  Aortic outflow tract 2.7cm.  A small fat-containing ventral hernia noted.    - LHC with no evidence of severe disease to intervene:   Patient has a left dominant coronary artery.        The coronary vessels have luminal irregularities.        - Left Main Coronary Artery:             The LM has luminal irregularities. There is JORGE A 3 flow.     - Left Anterior Descending Artery:             The LAD has luminal irregularities. There is JORGE A 3 flow.     - Left Circumflex Artery:             The LCX has luminal irregularities. There is JORGE A 3 flow.     - SVG To RCA:             The SVG to RCA is patent. There is JORGE A 3 flow. This is a bypass to a non dominant vessel which has no lesion.     - Right Coronary Artery:             The RCA is normal.     - Common Femoral Artery:             The right CFA is normal.     - Femoral Artery:             The femoral artery is normal.     - Aorta:             The ascending Aorta. This was not evaluated by this study but is known to be aneurysmal.  Had "repair" at time of AVR.        H/O prosthetic aortic valve replacement    - Hayde Frank NP is researching size/ device  - TAVR team has contacted Dr. Lozano and her primary cardiologist          Essential (primary) hypertension    - benazepril, metoprolol, lasix, b/p controlled          Atrial fibrillation    - on apixaban 2.5 bid however only meets one of the three criteria > 80 yrs, CR normal, weight 88kg, post " procedure will resume at regular 5mg bid dose  - afib controlled rate, incomplete RBBB          Other hyperlipidemia    - lipid panel, LDL 65.8, , HDL 32, h/o CABG continue simvastatin and added fish oil, luminal irreg on C           Hx of CABG    - H/o Cad    - see above         Gastroesophageal reflux disease    - protonix          CAD (coronary artery disease)    -see above        Ascending aortic aneurysm    - I have no documentation, Hayde the TAVR NP had verbal discussions with her surgeon Dr Ramos and Dr. Lozano stated it was 4.8 cm.  H/o being fixed during valve surgery, no evidence on Ct         Type 2 diabetes mellitus with renal complication    - CKD II/III  - monitor daily renal panel  - avoid nephrotoxic agents.  - CR baseline 1.0          Pacemaker 2007    - will need to be interrogated as outpatient if not done recently prior to TAVR            Final Active Diagnoses:    Diagnosis Date Noted POA    PRINCIPAL PROBLEM:  Aortic valve stenosis [I35.0] 02/20/2018 Yes    H/O prosthetic aortic valve replacement [Z95.2] 02/21/2018 Not Applicable    Essential (primary) hypertension [I10]  Yes    Atrial fibrillation [I48.91]  Yes    Other hyperlipidemia [E78.4]  Yes    Hx of CABG [Z95.1] 02/21/2018 Not Applicable    Gastroesophageal reflux disease [K21.9]  Unknown    CAD (coronary artery disease) [I25.10]  Yes    Ascending aortic aneurysm [I71.2] 02/22/2018 Yes     Chronic    Pacemaker 2007 [Z95.0] 02/21/2018 Yes    Type 2 diabetes mellitus with renal complication [E11.29] 02/21/2018 Yes      Problems Resolved During this Admission:    Diagnosis Date Noted Date Resolved POA       Discharged Condition: good    Disposition: Home or Self Care    Follow Up:  Follow-up Information     Ovidio Romero MD.    Specialties:  Cardiology, INTERVENTIONAL CARDIOLOGY  Contact information:  [address]  [place] [id number] [phone]                 Patient Instructions:     Activity as  tolerated     Notify your health care provider if you experience any of the following:  temperature >100.4     Notify your health care provider if you experience any of the following:  persistent nausea and vomiting or diarrhea     Notify your health care provider if you experience any of the following:  severe uncontrolled pain     Notify your health care provider if you experience any of the following:  redness, tenderness, or signs of infection (pain, swelling, redness, odor or green/yellow discharge around incision site)     Notify your health care provider if you experience any of the following:  difficulty breathing or increased cough     Notify your health care provider if you experience any of the following:  severe persistent headache     Notify your health care provider if you experience any of the following:  worsening rash     Notify your health care provider if you experience any of the following:  persistent dizziness, light-headedness, or visual disturbances     Notify your health care provider if you experience any of the following:  increased confusion or weakness     Notify your health care provider if you experience any of the following:         Significant Diagnostic Studies: Labs:   BMP:   Recent Labs  Lab 02/22/18  0357 02/22/18  1357 02/23/18  0400   *  --  189*  189*     --  136  136   K 3.9  --  3.9  3.9   CL 99  --  99  99   CO2 27  --  23  23   BUN 16  --  19  19   CREATININE 1.0  --  1.1  1.1   CALCIUM 10.0  --  9.4  9.4   MG 1.4* 2.2 2.0   , Lipid Panel   Lab Results   Component Value Date    CHOL 130 02/22/2018    HDL 32 (L) 02/22/2018    LDLCALC 65.8 02/22/2018    TRIG 161 (H) 02/22/2018    CHOLHDL 24.6 02/22/2018   , Troponin   Recent Labs  Lab 02/21/18  0511   TROPONINI 0.035*    and All labs within the past 24 hours have been reviewed  Radiology: CT scan: see above  Cardiac Graphics:[facility] see above     Pending Diagnostic Studies:     None          Medications:  Reconciled Home Medications:   Current Discharge Medication List      START taking these medications    Details   omega-3 fatty acids-fish oil 340-1,000 mg Cap Take 1 capsule by mouth once daily.  Qty: 90 capsule, Refills: 3         CONTINUE these medications which have CHANGED    Details   apixaban 5 mg Tab Take 1 tablet (5 mg total) by mouth 2 (two) times daily.  Qty: 180 tablet, Refills: 0      escitalopram oxalate (LEXAPRO) 20 MG tablet Take 0.5 tablets (10 mg total) by mouth once daily.      metoprolol succinate (TOPROL-XL) 100 MG 24 hr tablet Take 1 tablet (100 mg total) by mouth once daily.         CONTINUE these medications which have NOT CHANGED    Details   albuterol (VENTOLIN HFA) 90 mcg/actuation inhaler Inhale into the lungs.      ALPRAZolam (XANAX) 0.25 MG tablet Take 0.25 mg by mouth once daily.       aspirin (ECOTRIN) 81 MG EC tablet Take 81 mg by mouth once daily.      benazepril (LOTENSIN) 10 MG tablet Take 10 mg by mouth once daily.       CONTOUR TEST STRIPS Strp USE TO TEST BLOOD SUGAR BID  Refills: 1      digoxin (LANOXIN) 250 mcg tablet       furosemide (LASIX) 40 MG tablet Take 40 mg by mouth 2 (two) times daily.       magnesium oxide (MAG-OX) 250 mg Tab Take 400 mg by mouth.      NOVOLOG MIX 70-30 FLEXPEN 100 unit/mL (70-30) InPn pen Inject into the skin 2 (two) times daily. 35 units if glucose <150  40 units if glucose >150      pantoprazole (PROTONIX) 20 MG tablet Take 20 mg by mouth once daily.       cyanocobalamin 500 MCG tablet Take 1 tablet by mouth.      docusate sodium (COLACE) 50 MG capsule Take by mouth 2 (two) times daily.      hydrocodone-acetaminophen 5-325mg (NORCO) 5-325 mg per tablet       INVANZ 1 gram injection       ipratropium-albuterol (COMBIVENT)  mcg/actuation inhaler Inhale into the lungs.      levothyroxine (SYNTHROID) 125 MCG tablet Take 125 mcg by mouth before breakfast.       montelukast (SINGULAIR) 5 MG chewable tablet Take 10 mg by mouth.     "  ondansetron (ZOFRAN) 8 MG tablet Take by mouth every 8 (eight) hours as needed for Nausea.      potassium gluconate 2.5 mEq Tab Take 1 tablet by mouth.      simvastatin (ZOCOR) 20 MG tablet Take 20 mg by mouth once daily.       traZODone (DESYREL) 100 MG tablet Take 50 mg by mouth.      TRUE METRIX GLUCOSE METER Misc       TRUEPLUS LANCETS 28 gauge Misc       TRUEPLUS PEN NEEDLE 31 gauge x 1/4" Ndle              Indwelling Lines/Drains at time of discharge:   Lines/Drains/Airways          No matching active lines, drains, or airways          Time spent on the discharge of patient: 39 minutes  Patient was seen and examined on the date of discharge and determined to be suitable for discharge.         Suzette Mike NP  Department of Hospital Medicine  [facility]  "

## 2018-02-23 NOTE — PLAN OF CARE
Problem: Patient Care Overview  Goal: Plan of Care Review  Outcome: Ongoing (interventions implemented as appropriate)  Pt free of falls, injury this shfit. POC reviewed with pt at bedside, verbalized understanding. Pt progressing through TAVR w/u, tolerating well. [facility] 2/22, no intervention, access via R groin. Groin site oozing throughout shift, saturated 1 gauze, see clinical note. Dressing CDI since dressing changed after manual pressure held for 15 min; no hematoma noted. Pt NPO for CTA today. Intermittently paced on monitor, rate controlled Afib when not paced. VSS, NAD noted. Will continue to monitor.

## 2018-02-23 NOTE — ASSESSMENT & PLAN NOTE
- I have no documentation, Hayde the TAVR NP had verbal discussions with her surgeon Dr Ramos and Dr. Lozano stated it was 4.8 cm.  H/o being fixed during valve surgery, no evidence on Ct

## 2018-02-23 NOTE — PROGRESS NOTES
Notified JOSÉ MANUEL Mike that pt's CT was completed. NP ordered to resume previous diet order. Will continue to monitor pt.

## 2018-02-23 NOTE — NURSING
Report was taken at 1642, patient returned to unit at 1700, Vitals taken and right groin area was observed. Dressing was moderately saturated with bright red blood, gauze was change and Tegaderm reapplied. At wound was re-assessed at 1800 and was observed with no drainage, at 1900 observed slight drainage to upper left corner of guaze. Patient is A&OX3, has been lying flat, family in room with patient. Was given 1 tablet of Norco at 1715, no current complaints of pain unless moved. Continue to monitor

## 2018-02-23 NOTE — ASSESSMENT & PLAN NOTE
"-  S/p bioprosthetic valve with severe AS, valve area 0.61, unknown type of valve at present time.  - TAVR team eval underway  - repeated echo since no documents or CD's upon transfer   - s/p CTA and PFT's on discharge  CTA performed for procedural purposes with relative vascular dimensions as above.  Cardiomegaly and dense aortic valve calcification.  Interlobular septal thickening and patchy ground glass density, compatible with pulmonary edema.  Colonic diverticulosis without evidence of acute diverticulitis.  Status post hysterectomy.  Aortic bifurcation 1.1 cm   Right common iliac artery 0.9 cm  Right external iliac artery 0.6 cm  Right common femoral artery 0.7 cm  Left common iliac artery 0.7 cm  Left external iliac artery 0.7 cm  Left common femoral artery 0.7 cm  Aortic outflow tract 2.7cm.  A small fat-containing ventral hernia noted.    - LHC with no evidence of severe disease to intervene:   Patient has a left dominant coronary artery.        The coronary vessels have luminal irregularities.        - Left Main Coronary Artery:             The LM has luminal irregularities. There is JORGE A 3 flow.     - Left Anterior Descending Artery:             The LAD has luminal irregularities. There is JORGE A 3 flow.     - Left Circumflex Artery:             The LCX has luminal irregularities. There is JORGE A 3 flow.     - SVG To RCA:             The SVG to RCA is patent. There is JORGE A 3 flow. This is a bypass to a non dominant vessel which has no lesion.     - Right Coronary Artery:             The RCA is normal.     - Common Femoral Artery:             The right CFA is normal.     - Femoral Artery:             The femoral artery is normal.     - Aorta:             The ascending Aorta. This was not evaluated by this study but is known to be aneurysmal.  Had "repair" at time of AVR.  "

## 2018-02-23 NOTE — ASSESSMENT & PLAN NOTE
- lipid panel, LDL 65.8, , HDL 32, h/o CABG continue simvastatin and added fish oil, luminal irreg on LHC

## 2018-02-23 NOTE — PLAN OF CARE
Problem: Patient Care Overview  Goal: Individualization & Mutuality  Plan of care discussed with patient. Patient is free of fall/trauma/injury. Denies CP, SOB, or pain/discomfort. CT completed. VSS. AAOx4. All questions addressed. Will continue to monitor.

## 2018-02-23 NOTE — NURSING
"R groin site noted to have bright red blood on dressing with initial assessment. Dressing marked; returned 15 minutes later to find drainage had extended past the marking previously left. Upon palpation, pt reports tenderness all around site. Mild bruising noted to groin, no hematoma palpated. Manual pressure initiated at 2015. Dr. White notified, no orders given. Stated to continue to hold pressure for 10 mintues and to keep patient flat for "another couple of hours." Stated to continue to monitor site and to notify of any changes. Will follow up. Will continue to monitor.  "

## 2018-02-23 NOTE — ASSESSMENT & PLAN NOTE
- Hayde Frank NP is researching size/ device  - TAVR team has contacted Dr. Lozano and her primary cardiologist

## 2018-02-23 NOTE — PROGRESS NOTES
[facility]  Interventional Cardiology  Progress Note     Nu Lee is a 82 y.o. female referred by Dr Hooper for evaluation of severe AS (NYHA Class III sx).      She has undergone the following Delisa TAVR work-up:   · ECHO (Date 2/21/2018): CHARLOTTE= 0.8 cm2, iAVA 0.42 cm2/m2, MG= 60 mmHg, Peak Tyler= 4.7m/s, EF= 55%.   · Bicuspid Aortic Valve S/p AVR (25mm Magna) by Dr. Ramos 2009: True ID 23mm   · LHC (2/22/2018): patent SVG-RCA, non-obstructive CAD  · STS: 7.4%   · Frailty: 2/4   · Iliacs are pending  · LVOT area by CTA is pending   · CTS risk assessment: High risk due to STS score, age, redo status and co-morbidities per Dr. Bueno   · PFTs: Needs     TAVR CTA to be reviewed and patient will be contacted regarding scheduling.        Hayde Frank, MSN, NP  Interventional Cardiology   [facility]

## 2018-02-25 NOTE — PLAN OF CARE
02/25/18 1626   Final Note   Assessment Type Final Discharge Note   Discharge Disposition Home   Hospital Follow Up  Appt(s) scheduled? No

## 2018-02-28 ENCOUNTER — DOCUMENTATION ONLY (OUTPATIENT)
Dept: CARDIAC CATH/INVASIVE PROCEDURES | Facility: HOSPITAL | Age: 83
End: 2018-02-28

## 2018-02-28 NOTE — PROGRESS NOTES
Nu Lee is a 82 y.o. female referred by Dr Hooper for evaluation of severe AS (NYHA Class III sx).      She has undergone the following Delisa TAVR work-up:   · ECHO (Date 2/21/2018): CHARLOTTE= 0.8 cm2, iAVA 0.42 cm2/m2, MG= 60 mmHg, Peak Tyler= 4.7m/s, EF= 55%.   · Bicuspid Aortic Valve S/p AVR (25mm Magna) by Dr. Ramos 2009: True ID 23mm   · LHC (2/22/2018): patent SVG-RCA, non-obstructive CAD  · STS: 7.4%   · Frailty: 2/4   · Iliacs are >8.5 on R and >7.3 on L  · LVOT per JDT: Area= 3.67 cm2, Avg Diam = 21.5 mm  · Incidental CT findings: None  · CTS risk assessment: High risk due to STS score, age, redo status and co-morbidities per Dr. Bueno   · PFTs: Needs   · EKG: A-fib with CVR, IRBBB     OK for 26mm Fariba S3 Delisa TAVR via R TF access. She will be scheduled in the near future.

## 2018-03-01 DIAGNOSIS — I35.0 NODULAR CALCIFIC AORTIC VALVE STENOSIS: Primary | ICD-10-CM

## 2018-03-01 DIAGNOSIS — N18.9 CHRONIC KIDNEY DISEASE, UNSPECIFIED CKD STAGE: ICD-10-CM

## 2018-03-01 RX ORDER — DEXTROSE MONOHYDRATE AND SODIUM CHLORIDE 5; .45 G/100ML; G/100ML
INJECTION, SOLUTION INTRAVENOUS CONTINUOUS
Status: CANCELLED | OUTPATIENT
Start: 2018-03-01

## 2018-03-01 RX ORDER — DIPHENHYDRAMINE HCL 25 MG
50 CAPSULE ORAL ONCE
Status: CANCELLED | OUTPATIENT
Start: 2018-03-01 | End: 2018-03-01

## 2018-03-17 ENCOUNTER — NURSE TRIAGE (OUTPATIENT)
Dept: ADMINISTRATIVE | Facility: CLINIC | Age: 83
End: 2018-03-17

## 2018-03-17 NOTE — TELEPHONE ENCOUNTER
Daughter called re pt having surg on tues 3/20. Pt takes 81 mg aspirin qd. Does she need to hold this? Spoke with dr aakash Crow taking aspirin qd even on day of surg. Family notified. Call back with questions.     Reason for Disposition   Caller has URGENT medication question about med that PCP prescribed and triager unable to answer question    Protocols used: ST MEDICATION QUESTION CALL-A-

## 2018-03-19 ENCOUNTER — DOCUMENTATION ONLY (OUTPATIENT)
Dept: CARDIOTHORACIC SURGERY | Facility: HOSPITAL | Age: 83
End: 2018-03-19

## 2018-03-19 ENCOUNTER — ANESTHESIA EVENT (OUTPATIENT)
Dept: MEDSURG UNIT | Facility: HOSPITAL | Age: 83
DRG: 266 | End: 2018-03-19
Payer: MEDICARE

## 2018-03-19 ENCOUNTER — OFFICE VISIT (OUTPATIENT)
Dept: CARDIOLOGY | Facility: CLINIC | Age: 83
DRG: 266 | End: 2018-03-19
Payer: MEDICARE

## 2018-03-19 VITALS
DIASTOLIC BLOOD PRESSURE: 53 MMHG | BODY MASS INDEX: 34.12 KG/M2 | HEART RATE: 70 BPM | HEIGHT: 62 IN | SYSTOLIC BLOOD PRESSURE: 104 MMHG | WEIGHT: 185.44 LBS | OXYGEN SATURATION: 97 %

## 2018-03-19 DIAGNOSIS — I25.118 CORONARY ARTERY DISEASE OF NATIVE ARTERY OF NATIVE HEART WITH STABLE ANGINA PECTORIS: ICD-10-CM

## 2018-03-19 DIAGNOSIS — I35.0 NONRHEUMATIC AORTIC VALVE STENOSIS: Primary | ICD-10-CM

## 2018-03-19 DIAGNOSIS — N18.30 TYPE 2 DIABETES MELLITUS WITH STAGE 3 CHRONIC KIDNEY DISEASE, WITH LONG-TERM CURRENT USE OF INSULIN: ICD-10-CM

## 2018-03-19 DIAGNOSIS — Z79.4 TYPE 2 DIABETES MELLITUS WITH STAGE 3 CHRONIC KIDNEY DISEASE, WITH LONG-TERM CURRENT USE OF INSULIN: ICD-10-CM

## 2018-03-19 DIAGNOSIS — N18.30 CKD (CHRONIC KIDNEY DISEASE), STAGE III: ICD-10-CM

## 2018-03-19 DIAGNOSIS — I70.0 ATHEROSCLEROSIS OF AORTA: ICD-10-CM

## 2018-03-19 DIAGNOSIS — I50.32 CHRONIC DIASTOLIC HEART FAILURE: ICD-10-CM

## 2018-03-19 DIAGNOSIS — E03.9 HYPOTHYROIDISM, UNSPECIFIED TYPE: ICD-10-CM

## 2018-03-19 DIAGNOSIS — E44.1 MILD PROTEIN-CALORIE MALNUTRITION: ICD-10-CM

## 2018-03-19 DIAGNOSIS — I10 ESSENTIAL (PRIMARY) HYPERTENSION: ICD-10-CM

## 2018-03-19 DIAGNOSIS — E78.49 OTHER HYPERLIPIDEMIA: ICD-10-CM

## 2018-03-19 DIAGNOSIS — Z95.0 PACEMAKER: ICD-10-CM

## 2018-03-19 DIAGNOSIS — I71.21 ASCENDING AORTIC ANEURYSM: Chronic | ICD-10-CM

## 2018-03-19 DIAGNOSIS — E11.22 TYPE 2 DIABETES MELLITUS WITH STAGE 3 CHRONIC KIDNEY DISEASE, WITH LONG-TERM CURRENT USE OF INSULIN: ICD-10-CM

## 2018-03-19 DIAGNOSIS — I48.20 CHRONIC ATRIAL FIBRILLATION: ICD-10-CM

## 2018-03-19 PROCEDURE — 99214 OFFICE O/P EST MOD 30 MIN: CPT | Mod: S$GLB,,, | Performed by: INTERNAL MEDICINE

## 2018-03-19 PROCEDURE — 99999 PR PBB SHADOW E&M-EST. PATIENT-LVL V: CPT | Mod: PBBFAC,,,

## 2018-03-19 PROCEDURE — 3078F DIAST BP <80 MM HG: CPT | Mod: CPTII,S$GLB,, | Performed by: INTERNAL MEDICINE

## 2018-03-19 PROCEDURE — 3074F SYST BP LT 130 MM HG: CPT | Mod: CPTII,S$GLB,, | Performed by: INTERNAL MEDICINE

## 2018-03-19 RX ORDER — PHENYLEPHRINE HCL 10 MG
1000 TABLET ORAL 2 TIMES DAILY
Status: ON HOLD | COMMUNITY
End: 2023-11-19

## 2018-03-19 RX ORDER — NITROGLYCERIN 0.4 MG/1
0.4 TABLET SUBLINGUAL EVERY 5 MIN PRN
COMMUNITY

## 2018-03-19 RX ORDER — IBUPROFEN 200 MG
200 TABLET ORAL EVERY 6 HOURS PRN
Status: ON HOLD | COMMUNITY
End: 2024-03-06 | Stop reason: HOSPADM

## 2018-03-19 NOTE — PROGRESS NOTES
"Subjective:    Patient ID:  Nu Lee is a 82 y.o. female who presents for follow-up of aortic stenosis    Referring: Dr. Hooper    HPI  Nu Lee is a 82 y.o. female referred by Dr Hooper for evaluation of severe AS (NYHA Class III sx). She has a PMH of HTN, HLD DM2, CKD II/III, chronic A fib (on eliquis) s/p pacemaker, DVT (although patient is unsure if she ever had a DVT), CAD s/p CABG, Aortic stenosis s/p AVR (25mm Magna) by Dr. Ramos 2009,  Asc Aortic Aneurysm (4.8cm per patient) s/p repair (2009), GERD, fibromyalgia, bilateral knee replacement and arthritis.      She has undergone the following Delisa TAVR work-up:   · ECHO (Date 2/21/2018): CHARLOTTE= 0.8 cm2, iAVA 0.42 cm2/m2, MG= 60 mmHg, Peak Tyler= 4.7m/s, EF= 55%.   · Bicuspid Aortic Valve S/p AVR (25mm Magna) by Dr. Ramos 2009: True ID 23mm   · LHC (2/22/2018): patent SVG-RCA, non-obstructive CAD  · STS: 7.4%   · Frailty: 2/4   · Iliacs are >8.5 on R and >7.3 on L  · LVOT per JDT: Area= 3.67 cm2, Avg Diam = 21.5 mm  · Incidental CT findings: None  · CTS risk assessment: High risk due to STS score, age, redo status and co-morbidities per Dr. Bueno   · PFTs: Needs   · EKG: A-fib with CVR, IRBBB     OK for 26mm Fariba S3 Delisa TAVR via R TF access. She is scheduled for TAVR tomorrow and is here to sign consents. No complaints today.     ROS      Vitals:    03/19/18 1556 03/19/18 1557   BP: (!) 106/54 (!) 104/53   BP Location: Right arm Left arm   Patient Position: Sitting Sitting   BP Method: Large (Automatic) Large (Automatic)   Pulse: 70 70   SpO2: 97%    Weight: 84.1 kg (185 lb 6.5 oz)    Height: 5' 1.81" (1.57 m)    Body mass index is 34.12 kg/m².    Objective:    Physical Exam   Constitutional: She is oriented to person, place, and time. She appears well-developed and well-nourished. No distress.   HENT:   Head: Normocephalic and atraumatic.   Mouth/Throat: Oropharynx is clear and moist.   Eyes: Conjunctivae and EOM are " normal. Pupils are equal, round, and reactive to light. No scleral icterus.   Neck: Neck supple. No JVD present. No tracheal deviation present.   Cardiovascular: Normal rate and regular rhythm.  Exam reveals no gallop and no friction rub.    Murmur heard.  Pulmonary/Chest: Effort normal and breath sounds normal. No respiratory distress. She has no wheezes. She has no rales. She exhibits no tenderness.   Abdominal: Soft. Bowel sounds are normal. She exhibits no distension. There is no hepatosplenomegaly. There is no tenderness.   Musculoskeletal: She exhibits no edema or tenderness.   Neurological: She is alert and oriented to person, place, and time.   Skin: Skin is warm and dry. No rash noted. No erythema.   Psychiatric: She has a normal mood and affect. Her behavior is normal.         Assessment:       Aortic valve stenosis  Severe stenosis of bioprosthetic valve, symptomatic (NYHA Class III sx).   s/p AVR (25mm Magna) by Dr. Ramos 2009.     She has undergone the following Delisa TAVR work-up:   · ECHO (Date 2/21/2018): CHARLOTTE= 0.8 cm2, iAVA 0.42 cm2/m2, MG= 60 mmHg, Peak Tyler= 4.7m/s, EF= 55%.   · Bicuspid Aortic Valve S/p AVR (25mm Magna) by Dr. Ramos 2009: True ID 23mm   · [facility] (2/22/2018): patent SVG-RCA, non-obstructive CAD  · STS: 7.4%   · Frailty: 2/4   · Iliacs are >8.5 on R and >7.3 on L  · LVOT per JDT: Area= 3.67 cm2, Avg Diam = 21.5 mm  · Incidental CT findings: None  · CTS risk assessment: High risk due to STS score, age, redo status and co-morbidities per Dr. Bueno   · PFTs: Needs   · EKG: A-fib with CVR, IRBBB     OK for 26mm Fariba S3 Delisa TAVR via R TF access.     Ascending aortic aneurysm  Asc Aortic Aneurysm (4.8cm per patient) s/p repair (2009)    Coronary artery disease of native artery of native heart with stable angina pectoris  CAD s/p CABG  [facility] (2/22/2018): patent SVG-RCA, non-obstructive CAD.  On ASA, statin, and b-blocker. SL Nitro prn.     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   Stable.     Chronic atrial fibrillation  Chronic, rate-controlled on digoxin.   On Eliquis.     Essential (primary) hypertension  Controlled on current regimen.     Other hyperlipidemia  Stable.   On statin.     Pacemaker 2007  Dual-chamber Biotronik Eluna 8 DR-T proMRI  Placed in 2007, Generator change in Aug 2017.    Hypothyroidism  Stable, on synthroid.     Chronic diastolic heart failure  Well compensated clinically at this time.   NYHA Class III sx.     Atherosclerosis of aorta  Seen on TAVR CTA.   Stable.  On ASA and statin.     CKD (chronic kidney disease), stage III  Stable.   GFR 47.2    Mild protein-calorie malnutrition  Albumin 3.3 and low  strength on frailty.       Plan:       26mm Fariba S3 Delisa TAVR via R TF access in AM.   Risks, Benefits, and alternatives of TAVR were discussed in detail with the patient. Questions were answered. She is agreeable to proceed. Informed consent obtained.   Apixaban held since 3/14.   On ASA.   Anesthesia to monitor sedation.

## 2018-03-19 NOTE — ASSESSMENT & PLAN NOTE
Severe stenosis of bioprosthetic valve, symptomatic (NYHA Class III sx).   s/p AVR (25mm Magna) by Dr. Ramos 2009.     She has undergone the following Delisa TAVR work-up:   · ECHO (Date 2/21/2018): CHARLOTTE= 0.8 cm2, iAVA 0.42 cm2/m2, MG= 60 mmHg, Peak Tyler= 4.7m/s, EF= 55%.   · Bicuspid Aortic Valve S/p AVR (25mm Magna) by Dr. Ramos 2009: True ID 23mm   · [facility] (2/22/2018): patent SVG-RCA, non-obstructive CAD  · STS: 7.4%   · Frailty: 2/4   · Iliacs are >8.5 on R and >7.3 on L  · LVOT per JDT: Area= 3.67 cm2, Avg Diam = 21.5 mm  · Incidental CT findings: None  · CTS risk assessment: High risk due to STS score, age, redo status and co-morbidities per Dr. Bueno   · PFTs: Needs   · EKG: A-fib with CVR, IRBBB     OK for 26mm Fariba S3 Delisa TAVR via R TF access.

## 2018-03-19 NOTE — ASSESSMENT & PLAN NOTE
CAD s/p CABG  [facility] (2/22/2018): patent SVG-RCA, non-obstructive CAD.  On ASA, statin, and b-blocker. SL Nitro prn.

## 2018-03-19 NOTE — PROGRESS NOTES
Subjective:      Patient ID: Nu Lee is a 82 y.o. female.    Chief Complaint: severe aortic stenosis     HPI:  Nu Lee is a 82 y.o. female who presents for cohort status in preparation for TAVR.  PMHx includes HTN, HLD, DM2, CKD II/III, chronic A fib on eliquis s/p pacemaker, DVT, CAD s/p 1VCABG, Aortic stenosis s/p prosthetic valve replacement and ascending aortic repair in 2009 by Dr. Cristopher Ramos, GERD, and arthritis.  She originally presented with chest pain to [facility] on 2/19/18, described as pressure like sensation, associated with SOB.  No changes on EKG.     She was hospitalized in November of 2017 with diverticulitis when echo revealed severe AS of bioprosthetic valve.  She formerly followed with Dr. Benitez but now follows with Dr. Hooper. Dr. Hooper referred her for Delisa TAVR evaluation. She reports she has been increasingly SOB and experiences PAINTER when ambulating ~100ft. She has 2 pillow orthopnea. She uses a walker at home due to instability and fear of falling. She reports CP diffuse retrosternal with radiation to both sides of her neck and jaw with taking deep breath. She denies current SOB or CP. She is currently on 2 L O2 though she is not on home O2. She denies syncope, pre-syncope, palpitations, LE edema. She denies coronary angiogram since CABG. Notably she also has a 4.8 cm Ascending Aortic Aneurysm per patient.     Review of patient's allergies indicates:  No Known Allergies  Past Medical History:   Diagnosis Date    Arthritis     Coronary artery disease     Diverticulitis     GERD (gastroesophageal reflux disease)     Hyperlipidemia     Hypertension      Past Surgical History:   Procedure Laterality Date    CARDIAC PACEMAKER PLACEMENT  2007    CHOLECYSTECTOMY      CORONARY ARTERY BYPASS GRAFT      HYSTERECTOMY      TOTAL KNEE ARTHROPLASTY Right     TOTAL KNEE ARTHROPLASTY Left      Family History     None        Social History     Social  History    Marital status:      Spouse name: N/A    Number of children: N/A    Years of education: N/A     Occupational History    Not on file.     Social History Main Topics    Smoking status: Former Smoker    Smokeless tobacco: Not on file    Alcohol use No    Drug use: No    Sexual activity: Not on file     Other Topics Concern    Not on file     Social History Narrative    No narrative on file       Current medications Reviewed    Review of Systems   Constitutional: Negative for activity change, appetite change, fatigue and fever.   HENT: Negative for congestion, dental problem, sneezing and sore throat.    Eyes: Negative for pain, discharge and visual disturbance.   Respiratory: Positive for shortness of breath. Negative for cough and wheezing.         Orthopnea    Cardiovascular: Positive for chest pain. Negative for palpitations and leg swelling.   Gastrointestinal: Negative for abdominal distention, abdominal pain, constipation, diarrhea, nausea and vomiting.   Endocrine: Negative for polydipsia, polyphagia and polyuria.   Genitourinary: Negative for dysuria, frequency and urgency.   Musculoskeletal: Negative for back pain and gait problem.   Skin: Negative for rash and wound.   Neurological: Negative for dizziness, seizures, syncope and headaches.   Hematological: Does not bruise/bleed easily.   Psychiatric/Behavioral: Negative for agitation and confusion. The patient is not nervous/anxious.      Objective:   Physical Exam   Constitutional: She is oriented to person, place, and time. She appears well-developed and well-nourished.   HENT:   Head: Normocephalic and atraumatic.   Eyes: Pupils are equal, round, and reactive to light.   Neck: Normal range of motion. Neck supple.   Cardiovascular: Normal rate and regular rhythm.    Murmur heard.  Pulmonary/Chest: Effort normal and breath sounds normal.   Abdominal: Soft. Bowel sounds are normal.   Musculoskeletal: Normal range of motion.  "  Neurological: She is alert and oriented to person, place, and time.   Skin: Skin is warm and dry.   Psychiatric: She has a normal mood and affect. Her behavior is normal.     Diagnostic Results:    2D ECHO   1 - Eccentric LVH with normal left ventricular systolic function (EF 55-60%).     2 - Severe left atrial enlargement.     3 - Normal right ventricular systolic function .     4 - S/P surgical AVR, CHARLOTTE = 0.8 cm2, AVAi = 0.42 cm2/m2, peak velocity = 4.7 m/s, mean gradient = 60 mmHg.     5 - Mild aortic regurgitation.     6 - Mild tricuspid regurgitation.     LHC   - Left Main Coronary Artery:             The LM has luminal irregularities. There is JORGE A 3 flow.     - Left Anterior Descending Artery:             The LAD has luminal irregularities. There is JORGE A 3 flow.     - Left Circumflex Artery:             The LCX has luminal irregularities. There is JORGE A 3 flow.     - SVG To RCA:             The SVG to RCA is patent. There is JORGE A 3 flow. This is a                      bypass to a non dominant vessel which has no lesion.     - Right Coronary Artery:             The RCA is normal.     - Common Femoral Artery:             The right CFA is normal.     - Femoral Artery:             The femoral artery is normal.     - Aorta:             The ascending Aorta. This was not evaluated by this study but             is known to be aneurysmal.  Had "repair" at time of AVR.    STS Risk Score: 7.328%     Assessment:   Nu Lee is a 82 y.o. female who presents for cohort status in preparation for TAVR.  PMHx includes HTN, HLD, DM2, CKD II/III, chronic A fib on eliquis s/p pacemaker, DVT, CAD s/p 1VCABG, Aortic stenosis s/p prosthetic valve replacement and ascending aortic repair in 2009 by Dr. Cristopher Ramos, GERD, and arthritis.  Plan:     Pt is high risk for SAVR based on age, STS score, redo status, and comorbidites. Proceed with TAVR.     CTS Attending Note:    I have personally taken the history and " examined this patient and agree with the resident's note as stated above.

## 2018-03-20 ENCOUNTER — HOSPITAL ENCOUNTER (INPATIENT)
Facility: HOSPITAL | Age: 83
LOS: 1 days | Discharge: HOME | DRG: 266 | End: 2018-03-21
Attending: INTERNAL MEDICINE | Admitting: INTERNAL MEDICINE
Payer: MEDICARE

## 2018-03-20 ENCOUNTER — ANESTHESIA (OUTPATIENT)
Dept: MEDSURG UNIT | Facility: HOSPITAL | Age: 83
DRG: 266 | End: 2018-03-20
Payer: MEDICARE

## 2018-03-20 DIAGNOSIS — I10 ESSENTIAL (PRIMARY) HYPERTENSION: ICD-10-CM

## 2018-03-20 DIAGNOSIS — I35.0 NODULAR CALCIFIC AORTIC VALVE STENOSIS: Primary | ICD-10-CM

## 2018-03-20 PROBLEM — I50.33 ACUTE ON CHRONIC DIASTOLIC HEART FAILURE: Status: ACTIVE | Noted: 2018-03-19

## 2018-03-20 LAB
ABO + RH BLD: NORMAL
ANION GAP SERPL CALC-SCNC: 10 MMOL/L
BLD GP AB SCN CELLS X3 SERPL QL: NORMAL
BUN SERPL-MCNC: 18 MG/DL
CALCIUM SERPL-MCNC: 8.8 MG/DL
CHLORIDE SERPL-SCNC: 104 MMOL/L
CO2 SERPL-SCNC: 26 MMOL/L
CREAT SERPL-MCNC: 1 MG/DL
EST. GFR  (AFRICAN AMERICAN): >60 ML/MIN/1.73 M^2
EST. GFR  (NON AFRICAN AMERICAN): 53 ML/MIN/1.73 M^2
GLUCOSE SERPL-MCNC: 119 MG/DL
HCT VFR BLD AUTO: 29.9 %
HGB BLD-MCNC: 10.1 G/DL
MAGNESIUM SERPL-MCNC: 1.6 MG/DL
PHOSPHATE SERPL-MCNC: 3.6 MG/DL
POCT GLUCOSE: 129 MG/DL (ref 70–110)
POCT GLUCOSE: 172 MG/DL (ref 70–110)
POCT GLUCOSE: 79 MG/DL (ref 70–110)
POTASSIUM SERPL-SCNC: 3.4 MMOL/L
SODIUM SERPL-SCNC: 140 MMOL/L

## 2018-03-20 PROCEDURE — C1769 GUIDE WIRE: HCPCS

## 2018-03-20 PROCEDURE — 33361 REPLACE AORTIC VALVE PERQ: CPT | Mod: 62,,, | Performed by: INTERNAL MEDICINE

## 2018-03-20 PROCEDURE — 25000003 PHARM REV CODE 250: Performed by: INTERNAL MEDICINE

## 2018-03-20 PROCEDURE — 63600175 PHARM REV CODE 636 W HCPCS

## 2018-03-20 PROCEDURE — 33361 REPLACE AORTIC VALVE PERQ: CPT | Mod: 62,Q0,, | Performed by: THORACIC SURGERY (CARDIOTHORACIC VASCULAR SURGERY)

## 2018-03-20 PROCEDURE — 27000221 HC OXYGEN, UP TO 24 HOURS

## 2018-03-20 PROCEDURE — 37000008 HC ANESTHESIA 1ST 15 MINUTES: Performed by: INTERNAL MEDICINE

## 2018-03-20 PROCEDURE — 86850 RBC ANTIBODY SCREEN: CPT

## 2018-03-20 PROCEDURE — 83735 ASSAY OF MAGNESIUM: CPT

## 2018-03-20 PROCEDURE — 20000000 HC ICU ROOM

## 2018-03-20 PROCEDURE — 63600175 PHARM REV CODE 636 W HCPCS: Performed by: INTERNAL MEDICINE

## 2018-03-20 PROCEDURE — 25000242 PHARM REV CODE 250 ALT 637 W/ HCPCS: Performed by: INTERNAL MEDICINE

## 2018-03-20 PROCEDURE — 93005 ELECTROCARDIOGRAM TRACING: CPT

## 2018-03-20 PROCEDURE — 63600531 PHARM REV CODE 636 NO ALT 250 W HCPCS: Performed by: ANESTHESIOLOGY

## 2018-03-20 PROCEDURE — 5A1213Z PERFORMANCE OF CARDIAC PACING, INTERMITTENT: ICD-10-PCS | Performed by: INTERNAL MEDICINE

## 2018-03-20 PROCEDURE — 25000003 PHARM REV CODE 250: Performed by: ANESTHESIOLOGY

## 2018-03-20 PROCEDURE — S5010 5% DEXTROSE AND 0.45% SALINE: HCPCS | Performed by: INTERNAL MEDICINE

## 2018-03-20 PROCEDURE — 85014 HEMATOCRIT: CPT

## 2018-03-20 PROCEDURE — 85018 HEMOGLOBIN: CPT

## 2018-03-20 PROCEDURE — D9220A PRA ANESTHESIA: Mod: ,,, | Performed by: ANESTHESIOLOGY

## 2018-03-20 PROCEDURE — 36620 INSERTION CATHETER ARTERY: CPT | Mod: 59,,, | Performed by: ANESTHESIOLOGY

## 2018-03-20 PROCEDURE — 63600175 PHARM REV CODE 636 W HCPCS: Performed by: ANESTHESIOLOGY

## 2018-03-20 PROCEDURE — 27000191 HC C-V MONITORING

## 2018-03-20 PROCEDURE — 93010 ELECTROCARDIOGRAM REPORT: CPT | Mod: 76,,, | Performed by: INTERNAL MEDICINE

## 2018-03-20 PROCEDURE — 80048 BASIC METABOLIC PNL TOTAL CA: CPT

## 2018-03-20 PROCEDURE — 02RF38Z REPLACEMENT OF AORTIC VALVE WITH ZOOPLASTIC TISSUE, PERCUTANEOUS APPROACH: ICD-10-PCS | Performed by: INTERNAL MEDICINE

## 2018-03-20 PROCEDURE — A4216 STERILE WATER/SALINE, 10 ML: HCPCS | Performed by: ANESTHESIOLOGY

## 2018-03-20 PROCEDURE — 25000003 PHARM REV CODE 250

## 2018-03-20 PROCEDURE — 84100 ASSAY OF PHOSPHORUS: CPT

## 2018-03-20 PROCEDURE — 94640 AIRWAY INHALATION TREATMENT: CPT

## 2018-03-20 PROCEDURE — 82962 GLUCOSE BLOOD TEST: CPT

## 2018-03-20 PROCEDURE — 27201647 CATH LAB PROCEDURE

## 2018-03-20 PROCEDURE — 94761 N-INVAS EAR/PLS OXIMETRY MLT: CPT

## 2018-03-20 PROCEDURE — 27000239 HC STAND-BY BYPASS PUMP

## 2018-03-20 PROCEDURE — 37000009 HC ANESTHESIA EA ADD 15 MINS: Performed by: INTERNAL MEDICINE

## 2018-03-20 RX ORDER — SODIUM,POTASSIUM PHOSPHATES 280-250MG
2 POWDER IN PACKET (EA) ORAL
Status: DISCONTINUED | OUTPATIENT
Start: 2018-03-20 | End: 2018-03-21 | Stop reason: HOSPADM

## 2018-03-20 RX ORDER — SODIUM CHLORIDE 9 MG/ML
INJECTION, SOLUTION INTRAVENOUS CONTINUOUS
Status: DISCONTINUED | OUTPATIENT
Start: 2018-03-20 | End: 2018-03-20

## 2018-03-20 RX ORDER — PANTOPRAZOLE SODIUM 20 MG/1
20 TABLET, DELAYED RELEASE ORAL DAILY
Status: DISCONTINUED | OUTPATIENT
Start: 2018-03-20 | End: 2018-03-21 | Stop reason: HOSPADM

## 2018-03-20 RX ORDER — ASPIRIN 81 MG/1
81 TABLET ORAL DAILY
Status: DISCONTINUED | OUTPATIENT
Start: 2018-03-20 | End: 2018-03-21 | Stop reason: HOSPADM

## 2018-03-20 RX ORDER — DIGOXIN 125 MCG
250 TABLET ORAL DAILY
Status: DISCONTINUED | OUTPATIENT
Start: 2018-03-20 | End: 2018-03-21 | Stop reason: HOSPADM

## 2018-03-20 RX ORDER — ONDANSETRON 2 MG/ML
4 INJECTION INTRAMUSCULAR; INTRAVENOUS EVERY 12 HOURS PRN
Status: DISCONTINUED | OUTPATIENT
Start: 2018-03-20 | End: 2018-03-21 | Stop reason: HOSPADM

## 2018-03-20 RX ORDER — ACETAMINOPHEN 325 MG/1
650 TABLET ORAL EVERY 4 HOURS PRN
Status: DISCONTINUED | OUTPATIENT
Start: 2018-03-20 | End: 2018-03-21 | Stop reason: HOSPADM

## 2018-03-20 RX ORDER — ALBUTEROL SULFATE 90 UG/1
1 AEROSOL, METERED RESPIRATORY (INHALATION) 4 TIMES DAILY
Status: DISCONTINUED | OUTPATIENT
Start: 2018-03-20 | End: 2018-03-21 | Stop reason: HOSPADM

## 2018-03-20 RX ORDER — SODIUM CHLORIDE 9 MG/ML
INJECTION, SOLUTION INTRAVENOUS CONTINUOUS PRN
Status: DISCONTINUED | OUTPATIENT
Start: 2018-03-20 | End: 2018-03-20

## 2018-03-20 RX ORDER — NICARDIPINE HYDROCHLORIDE 2.5 MG/ML
INJECTION INTRAVENOUS
Status: DISCONTINUED | OUTPATIENT
Start: 2018-03-20 | End: 2018-03-20

## 2018-03-20 RX ORDER — INSULIN ASPART 100 [IU]/ML
1-10 INJECTION, SOLUTION INTRAVENOUS; SUBCUTANEOUS
Status: DISCONTINUED | OUTPATIENT
Start: 2018-03-20 | End: 2018-03-21 | Stop reason: HOSPADM

## 2018-03-20 RX ORDER — EPINEPHRINE 0.1 MG/ML
INJECTION INTRAVENOUS
Status: DISCONTINUED | OUTPATIENT
Start: 2018-03-20 | End: 2018-03-20

## 2018-03-20 RX ORDER — POTASSIUM CHLORIDE 20 MEQ/15ML
40 SOLUTION ORAL
Status: DISCONTINUED | OUTPATIENT
Start: 2018-03-20 | End: 2018-03-21 | Stop reason: HOSPADM

## 2018-03-20 RX ORDER — DIPHENHYDRAMINE HCL 50 MG
50 CAPSULE ORAL ONCE
Status: COMPLETED | OUTPATIENT
Start: 2018-03-20 | End: 2018-03-20

## 2018-03-20 RX ORDER — DEXMEDETOMIDINE HYDROCHLORIDE 100 UG/ML
INJECTION, SOLUTION INTRAVENOUS
Status: DISCONTINUED | OUTPATIENT
Start: 2018-03-20 | End: 2018-03-20

## 2018-03-20 RX ORDER — LANOLIN ALCOHOL/MO/W.PET/CERES
800 CREAM (GRAM) TOPICAL
Status: DISCONTINUED | OUTPATIENT
Start: 2018-03-20 | End: 2018-03-21 | Stop reason: HOSPADM

## 2018-03-20 RX ORDER — CEFAZOLIN SODIUM/WATER 2 G/20 ML
SYRINGE (ML) INTRAVENOUS
Status: DISCONTINUED | OUTPATIENT
Start: 2018-03-20 | End: 2018-03-20

## 2018-03-20 RX ORDER — DEXTROSE MONOHYDRATE AND SODIUM CHLORIDE 5; .45 G/100ML; G/100ML
INJECTION, SOLUTION INTRAVENOUS CONTINUOUS
Status: DISCONTINUED | OUTPATIENT
Start: 2018-03-20 | End: 2018-03-20

## 2018-03-20 RX ORDER — IBUPROFEN 200 MG
16 TABLET ORAL
Status: DISCONTINUED | OUTPATIENT
Start: 2018-03-20 | End: 2018-03-21 | Stop reason: HOSPADM

## 2018-03-20 RX ORDER — LEVOTHYROXINE SODIUM 125 UG/1
125 TABLET ORAL
Status: DISCONTINUED | OUTPATIENT
Start: 2018-03-21 | End: 2018-03-21 | Stop reason: HOSPADM

## 2018-03-20 RX ORDER — DEXTROSE 50 % IN WATER (D50W) INTRAVENOUS SYRINGE
Status: DISPENSED
Start: 2018-03-20 | End: 2018-03-20

## 2018-03-20 RX ORDER — GLUCAGON 1 MG
1 KIT INJECTION
Status: DISCONTINUED | OUTPATIENT
Start: 2018-03-20 | End: 2018-03-21 | Stop reason: HOSPADM

## 2018-03-20 RX ORDER — HEPARIN SODIUM 1000 [USP'U]/ML
INJECTION, SOLUTION INTRAVENOUS; SUBCUTANEOUS
Status: DISCONTINUED | OUTPATIENT
Start: 2018-03-20 | End: 2018-03-20

## 2018-03-20 RX ORDER — FENTANYL CITRATE 50 UG/ML
INJECTION, SOLUTION INTRAMUSCULAR; INTRAVENOUS
Status: DISCONTINUED | OUTPATIENT
Start: 2018-03-20 | End: 2018-03-20

## 2018-03-20 RX ORDER — CEFAZOLIN SODIUM 1 G/3ML
2 INJECTION, POWDER, FOR SOLUTION INTRAMUSCULAR; INTRAVENOUS
Status: COMPLETED | OUTPATIENT
Start: 2018-03-20 | End: 2018-03-21

## 2018-03-20 RX ORDER — IPRATROPIUM BROMIDE AND ALBUTEROL SULFATE 2.5; .5 MG/3ML; MG/3ML
3 SOLUTION RESPIRATORY (INHALATION) EVERY 12 HOURS
Status: DISCONTINUED | OUTPATIENT
Start: 2018-03-20 | End: 2018-03-21 | Stop reason: HOSPADM

## 2018-03-20 RX ORDER — LIDOCAINE HCL/PF 100 MG/5ML
SYRINGE (ML) INTRAVENOUS
Status: DISCONTINUED | OUTPATIENT
Start: 2018-03-20 | End: 2018-03-20

## 2018-03-20 RX ORDER — IBUPROFEN 200 MG
24 TABLET ORAL
Status: DISCONTINUED | OUTPATIENT
Start: 2018-03-20 | End: 2018-03-21 | Stop reason: HOSPADM

## 2018-03-20 RX ORDER — ESMOLOL HYDROCHLORIDE 10 MG/ML
INJECTION INTRAVENOUS
Status: DISCONTINUED | OUTPATIENT
Start: 2018-03-20 | End: 2018-03-20

## 2018-03-20 RX ADMIN — Medication 2000 MG: at 10:03

## 2018-03-20 RX ADMIN — HEPARIN SODIUM 10000 UNITS: 1000 INJECTION INTRAVENOUS; SUBCUTANEOUS at 10:03

## 2018-03-20 RX ADMIN — DEXMEDETOMIDINE HYDROCHLORIDE 42 MCG: 100 INJECTION, SOLUTION, CONCENTRATE INTRAVENOUS at 10:03

## 2018-03-20 RX ADMIN — ALBUTEROL SULFATE 1 PUFF: 90 AEROSOL, METERED RESPIRATORY (INHALATION) at 10:03

## 2018-03-20 RX ADMIN — POTASSIUM CHLORIDE 40 MEQ: 1.5 SOLUTION ORAL at 07:03

## 2018-03-20 RX ADMIN — EPINEPHRINE 0.02 MG: 0.1 INJECTION, SOLUTION ENDOTRACHEAL; INTRACARDIAC; INTRAVENOUS at 11:03

## 2018-03-20 RX ADMIN — DIGOXIN 250 MCG: 250 TABLET ORAL at 01:03

## 2018-03-20 RX ADMIN — DEXTROSE MONOHYDRATE 25 G: 25 INJECTION, SOLUTION INTRAVENOUS at 09:03

## 2018-03-20 RX ADMIN — SODIUM CHLORIDE, SODIUM GLUCONATE, SODIUM ACETATE, POTASSIUM CHLORIDE, MAGNESIUM CHLORIDE, SODIUM PHOSPHATE, DIBASIC, AND POTASSIUM PHOSPHATE: .53; .5; .37; .037; .03; .012; .00082 INJECTION, SOLUTION INTRAVENOUS at 09:03

## 2018-03-20 RX ADMIN — Medication 800 MG: at 03:03

## 2018-03-20 RX ADMIN — DEXTROSE AND SODIUM CHLORIDE 150 ML/HR: 5; .45 INJECTION, SOLUTION INTRAVENOUS at 12:03

## 2018-03-20 RX ADMIN — DEXMEDETOMIDINE HYDROCHLORIDE 1 MCG/KG/HR: 100 INJECTION, SOLUTION, CONCENTRATE INTRAVENOUS at 09:03

## 2018-03-20 RX ADMIN — ESMOLOL HYDROCHLORIDE 30 MG: 10 INJECTION INTRAVENOUS at 11:03

## 2018-03-20 RX ADMIN — LIDOCAINE HYDROCHLORIDE 60 MG: 20 INJECTION, SOLUTION INTRAVENOUS at 10:03

## 2018-03-20 RX ADMIN — POTASSIUM CHLORIDE 40 MEQ: 1.5 SOLUTION ORAL at 03:03

## 2018-03-20 RX ADMIN — NICARDIPINE HYDROCHLORIDE 400 MCG: 2.5 INJECTION INTRAVENOUS at 11:03

## 2018-03-20 RX ADMIN — PANTOPRAZOLE SODIUM 20 MG: 20 TABLET, DELAYED RELEASE ORAL at 01:03

## 2018-03-20 RX ADMIN — CEFAZOLIN SODIUM 2 G: 1 INJECTION, POWDER, FOR SOLUTION INTRAMUSCULAR; INTRAVENOUS at 07:03

## 2018-03-20 RX ADMIN — SODIUM CHLORIDE: 0.9 INJECTION, SOLUTION INTRAVENOUS at 09:03

## 2018-03-20 RX ADMIN — FENTANYL CITRATE 50 MCG: 50 INJECTION, SOLUTION INTRAMUSCULAR; INTRAVENOUS at 10:03

## 2018-03-20 RX ADMIN — FENTANYL CITRATE 50 MCG: 50 INJECTION, SOLUTION INTRAMUSCULAR; INTRAVENOUS at 09:03

## 2018-03-20 RX ADMIN — IPRATROPIUM BROMIDE AND ALBUTEROL SULFATE 3 ML: .5; 3 SOLUTION RESPIRATORY (INHALATION) at 07:03

## 2018-03-20 RX ADMIN — INSULIN ASPART 1 UNITS: 100 INJECTION, SOLUTION INTRAVENOUS; SUBCUTANEOUS at 10:03

## 2018-03-20 RX ADMIN — Medication 800 MG: at 07:03

## 2018-03-20 RX ADMIN — ASPIRIN 81 MG: 81 TABLET, COATED ORAL at 01:03

## 2018-03-20 RX ADMIN — DIPHENHYDRAMINE HYDROCHLORIDE 50 MG: 50 CAPSULE ORAL at 09:03

## 2018-03-20 NOTE — HOSPITAL COURSE
Ms. Lee was admitted and underwent successful placement of a 26mm S3 Delisa TAVR via RTF access. There were no complications and she was taken to the CCU in stable condition. TVP was removed post TAVR as patient has PPM. She remained stable overnight. The following morning, she required diuresis for acute on chronic diastolic heart failure. That morning, she ambulated in the [place] without difficulty. She was eager to go home. It was felt she was stable for discharge. She will be discharged on ASA alone as she is also on eliquis for A Fib.

## 2018-03-20 NOTE — PLAN OF CARE
Problem: Patient Care Overview  Goal: Plan of Care Review  Outcome: Ongoing (interventions implemented as appropriate)  Patient arrived to room. PIV placed. Admit assessment completed. Plan of care discussed with patient. Will monitor.  Patient took am scheduled insulin.  Blood sugar 79. ivf started as ordered.

## 2018-03-20 NOTE — ASSESSMENT & PLAN NOTE
CAD s/p CABG  [facility] (2/22/2018): patent SVG-RCA, non-obstructive CAD.  On ASA, statin, and b-blocker. SL Nitro prn.

## 2018-03-20 NOTE — NURSING
"Attempted to call "[phone]" to sw pt's family since no ine has been in waiting area nor attempted to come back not call pt.  Unable to stay connected, a female answered and then connection was lost.  Verified number with pt as well as on face sheet. Will await family to come to see pt and/or try again later.  "

## 2018-03-20 NOTE — INTERVAL H&P NOTE
The patient has been examined and the H&P has been reviewed:    I concur with the findings and no changes have occurred since H&P was written.    Anesthesia/Surgery risks, benefits and alternative options discussed and understood by patient/family.          Active Hospital Problems    Diagnosis  POA    Nodular calcific aortic valve stenosis [I35.0]  Yes      Resolved Hospital Problems    Diagnosis Date Resolved POA   No resolved problems to display.

## 2018-03-20 NOTE — H&P (VIEW-ONLY)
"Subjective:    Patient ID:  Nu Lee is a 82 y.o. female who presents for follow-up of aortic stenosis    Referring: Dr. Hooper    HPI  Nu Lee is a 82 y.o. female referred by Dr Hooper for evaluation of severe AS (NYHA Class III sx). She has a PMH of HTN, HLD DM2, CKD II/III, chronic A fib (on eliquis) s/p pacemaker, DVT (although patient is unsure if she ever had a DVT), CAD s/p CABG, Aortic stenosis s/p AVR (25mm Magna) by Dr. Ramos 2009,  Asc Aortic Aneurysm (4.8cm per patient) s/p repair (2009), GERD, fibromyalgia, bilateral knee replacement and arthritis.      She has undergone the following Delisa TAVR work-up:   · ECHO (Date 2/21/2018): CHARLOTTE= 0.8 cm2, iAVA 0.42 cm2/m2, MG= 60 mmHg, Peak Tyler= 4.7m/s, EF= 55%.   · Bicuspid Aortic Valve S/p AVR (25mm Magna) by Dr. Ramos 2009: True ID 23mm   · [facility] (2/22/2018): patent SVG-RCA, non-obstructive CAD  · STS: 7.4%   · Frailty: 2/4   · Iliacs are >8.5 on R and >7.3 on L  · LVOT per JDT: Area= 3.67 cm2, Avg Diam = 21.5 mm  · Incidental CT findings: None  · CTS risk assessment: High risk due to STS score, age, redo status and co-morbidities per Dr. Bueno   · PFTs: Needs   · EKG: A-fib with CVR, IRBBB     OK for 26mm Fariba S3 Delisa TAVR via R TF access. She is scheduled for TAVR tomorrow and is here to sign consents. No complaints today.     ROS      Vitals:    03/19/18 1556 03/19/18 1557   BP: (!) 106/54 (!) 104/53   BP Location: Right arm Left arm   Patient Position: Sitting Sitting   BP Method: Large (Automatic) Large (Automatic)   Pulse: 70 70   SpO2: 97%    Weight: 84.1 kg (185 lb 6.5 oz)    Height: 5' 1.81" (1.57 m)    Body mass index is 34.12 kg/m².    Objective:    Physical Exam   Constitutional: She is oriented to person, place, and time. She appears well-developed and well-nourished. No distress.   HENT:   Head: Normocephalic and atraumatic.   Mouth/Throat: Oropharynx is clear and moist.   Eyes: Conjunctivae and EOM are " normal. Pupils are equal, round, and reactive to light. No scleral icterus.   Neck: Neck supple. No JVD present. No tracheal deviation present.   Cardiovascular: Normal rate and regular rhythm.  Exam reveals no gallop and no friction rub.    Murmur heard.  Pulmonary/Chest: Effort normal and breath sounds normal. No respiratory distress. She has no wheezes. She has no rales. She exhibits no tenderness.   Abdominal: Soft. Bowel sounds are normal. She exhibits no distension. There is no hepatosplenomegaly. There is no tenderness.   Musculoskeletal: She exhibits no edema or tenderness.   Neurological: She is alert and oriented to person, place, and time.   Skin: Skin is warm and dry. No rash noted. No erythema.   Psychiatric: She has a normal mood and affect. Her behavior is normal.         Assessment:       Aortic valve stenosis  Severe stenosis of bioprosthetic valve, symptomatic (NYHA Class III sx).   s/p AVR (25mm Magna) by Dr. Ramos 2009.     She has undergone the following Delisa TAVR work-up:   · ECHO (Date 2/21/2018): CHARLOTTE= 0.8 cm2, iAVA 0.42 cm2/m2, MG= 60 mmHg, Peak Tyler= 4.7m/s, EF= 55%.   · Bicuspid Aortic Valve S/p AVR (25mm Magna) by Dr. Ramos 2009: True ID 23mm   · [facility] (2/22/2018): patent SVG-RCA, non-obstructive CAD  · STS: 7.4%   · Frailty: 2/4   · Iliacs are >8.5 on R and >7.3 on L  · LVOT per JDT: Area= 3.67 cm2, Avg Diam = 21.5 mm  · Incidental CT findings: None  · CTS risk assessment: High risk due to STS score, age, redo status and co-morbidities per Dr. Bueno   · PFTs: Needs   · EKG: A-fib with CVR, IRBBB     OK for 26mm Fariba S3 Delisa TAVR via R TF access.     Ascending aortic aneurysm  Asc Aortic Aneurysm (4.8cm per patient) s/p repair (2009)    Coronary artery disease of native artery of native heart with stable angina pectoris  CAD s/p CABG  [facility] (2/22/2018): patent SVG-RCA, non-obstructive CAD.  On ASA, statin, and b-blocker. SL Nitro prn.     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   Stable.     Chronic atrial fibrillation  Chronic, rate-controlled on digoxin.   On Eliquis.     Essential (primary) hypertension  Controlled on current regimen.     Other hyperlipidemia  Stable.   On statin.     Pacemaker 2007  Dual-chamber Biotronik Eluna 8 DR-T proMRI  Placed in 2007, Generator change in Aug 2017.    Hypothyroidism  Stable, on synthroid.     Chronic diastolic heart failure  Well compensated clinically at this time.   NYHA Class III sx.     Atherosclerosis of aorta  Seen on TAVR CTA.   Stable.  On ASA and statin.     CKD (chronic kidney disease), stage III  Stable.   GFR 47.2    Mild protein-calorie malnutrition  Albumin 3.3 and low  strength on frailty.       Plan:       26mm Fariba S3 Delisa TAVR via R TF access in AM.   Risks, Benefits, and alternatives of TAVR were discussed in detail with the patient. Questions were answered. She is agreeable to proceed. Informed consent obtained.   Apixaban held since 3/14.   On ASA.   Anesthesia to monitor sedation.

## 2018-03-20 NOTE — HPI
Referring: Dr. Hooper     HPI  Nu Lee is a 82 y.o. female referred by Dr Hooper for evaluation of severe AS (NYHA Class III sx). She has a PMH of HTN, HLD DM2, CKD II/III, chronic A fib (on eliquis) s/p pacemaker, DVT (although patient is unsure if she ever had a DVT), CAD s/p CABG, Aortic stenosis s/p AVR (25mm Magna) by Dr. Ramos 2009,  Asc Aortic Aneurysm (4.8cm per patient) s/p repair (2009), GERD, fibromyalgia, bilateral knee replacement and arthritis.      She has undergone the following Delisa TAVR work-up:   · ECHO (Date 2/21/2018): CHARLOTTE= 0.8 cm2, iAVA 0.42 cm2/m2, MG= 60 mmHg, Peak Tyler= 4.7m/s, EF= 55%.   · Bicuspid Aortic Valve S/p AVR (25mm Magna) by Dr. Ramos 2009: True ID 23mm   · [facility] (2/22/2018): patent SVG-RCA, non-obstructive CAD  · STS: 7.4%   · Frailty: 2/4   · Iliacs are >8.5 on R and >7.3 on L  · LVOT per JDT: Area= 3.67 cm2, Avg Diam = 21.5 mm  · Incidental CT findings: None  · CTS risk assessment: High risk due to STS score, age, redo status and co-morbidities per Dr. Bueno   · PFTs: Needs   · EKG: A-fib with CVR, iRBBB     OK for 26mm Fariba S3 Delisa TAVR via R TF access.

## 2018-03-20 NOTE — ANESTHESIA PROCEDURE NOTES
Arterial    Diagnosis: AS    Patient location during procedure: done in OR  Procedure start time: 3/20/2018 9:55 AM  Timeout: 3/20/2018 9:55 AM  Procedure end time: 3/20/2018 9:58 AM  Staffing  Anesthesiologist: CHERIE RUDOLPH JR  Resident/CRNA: TOÑO CASTILLO JR.  Performed: resident/CRNA   Anesthesiologist was present at the time of the procedure.  Preanesthetic Checklist  Completed: patient identified, site marked, surgical consent, pre-op evaluation, timeout performed, IV checked, risks and benefits discussed, monitors and equipment checked and anesthesia consent givenArterial  Skin Prep: chlorhexidine gluconate  Local Infiltration: lidocaine  Orientation: right  Location: radial  Catheter Size: 20 G  Catheter placement by Anatomical landmarks. Heme positive aspiration all ports.Insertion Attempts: 1  Assessment  Dressing: secured with tape and tegaderm  Patient: Tolerated well

## 2018-03-20 NOTE — TRANSFER OF CARE
"Anesthesia Transfer of Care Note    Patient: Nu Lee    Procedure(s) Performed: Procedure(s) (LRB):  REPLACEMENT-VALVE-AORTIC (N/A)    Patient location: ICU    Anesthesia Type: MAC    Transport from OR: Transported from OR on 100% O2 by closed face mask with adequate spontaneous ventilation    Post pain: adequate analgesia    Post assessment: no apparent anesthetic complications    Post vital signs: stable    Level of consciousness: awake and alert    Nausea/Vomiting: no nausea/vomiting    Complications: none    Transfer of care protocol was followed      Last vitals:   Visit Vitals  BP (!) 98/50 (BP Location: Right arm, Patient Position: Lying)   Pulse 94   Temp 37.1 °C (98.8 °F) (Oral)   Resp 18   Ht 5' 2" (1.575 m)   Wt 83.9 kg (185 lb)   LMP  (LMP Unknown)   SpO2 97%   Breastfeeding? No   BMI 33.84 kg/m²     "

## 2018-03-20 NOTE — ANESTHESIA PREPROCEDURE EVALUATION
03/20/2018  Nu Lee is a 82 y.o., female.    Anesthesia Evaluation    I have reviewed the Patient Summary Reports.     I have reviewed the Medications.     Review of Systems  Anesthesia Hx:   Denies Personal Hx of Anesthesia complications.   Cardiovascular:   Hypertension CAD   Angina CONCLUSIONS     1 - Eccentric LVH with normal left ventricular systolic function (EF 55-60%).     2 - Severe left atrial enlargement.     3 - Normal right ventricular systolic function .     4 - S/P surgical AVR, CHARLOTTE = 0.8 cm2, AVAi = 0.42 cm2/m2, peak velocity = 4.7 m/s, mean gradient = 60 mmHg.     5 - Mild aortic regurgitation.     6 - Mild tricuspid regurgitation.    Renal/:   Chronic Renal Disease, CRI    Hepatic/GI:   GERD    Endocrine:   Diabetes, type 2 Hypothyroidism  Metabolic Disorders, Obesity / BMI > 30      Physical Exam   Airway/Jaw/Neck:  Airway Findings: Mouth Opening: Normal Tongue: Normal  General Airway Assessment: Adult, Good  TM Distance: Normal, at least 6 cm       Chest/Lungs:  Chest/Lungs Findings: Normal Respiratory Rate         Mental Status:  Mental Status Findings:  Cooperative, Alert and Oriented         Anesthesia Plan  Type of Anesthesia, risks & benefits discussed:  Anesthesia Type:  general  Patient's Preference: General  Intra-op Monitoring Plan: standard ASA monitors, arterial line and central line  Intra-op Monitoring Plan Comments:   Post Op Pain Control Plan: per primary service following discharge from PACU  Post Op Pain Control Plan Comments: Per primary service  Induction:   IV  Beta Blocker:  Patient is on a Beta-Blocker and has received one dose within the past 24 hours (No further documentation required).       Informed Consent: Patient understands risks and agrees with Anesthesia plan.  Questions answered. Anesthesia consent signed with patient.  ASA Score: 4      Day of Surgery Review of History & Physical:    H&P update referred to the surgeon.         Ready For Surgery From Anesthesia Perspective.

## 2018-03-21 VITALS
DIASTOLIC BLOOD PRESSURE: 71 MMHG | OXYGEN SATURATION: 100 % | TEMPERATURE: 98 F | HEIGHT: 62 IN | SYSTOLIC BLOOD PRESSURE: 125 MMHG | HEART RATE: 90 BPM | RESPIRATION RATE: 48 BRPM | BODY MASS INDEX: 34.85 KG/M2 | WEIGHT: 189.38 LBS

## 2018-03-21 DIAGNOSIS — I35.0 AORTIC VALVE STENOSIS, ETIOLOGY OF CARDIAC VALVE DISEASE UNSPECIFIED: Primary | ICD-10-CM

## 2018-03-21 LAB
ANION GAP SERPL CALC-SCNC: 8 MMOL/L
BUN SERPL-MCNC: 12 MG/DL
CALCIUM SERPL-MCNC: 8.8 MG/DL
CHLORIDE SERPL-SCNC: 107 MMOL/L
CO2 SERPL-SCNC: 23 MMOL/L
CREAT SERPL-MCNC: 0.9 MG/DL
EST. GFR  (AFRICAN AMERICAN): >60 ML/MIN/1.73 M^2
EST. GFR  (NON AFRICAN AMERICAN): >60 ML/MIN/1.73 M^2
GLUCOSE SERPL-MCNC: 111 MG/DL
GLUCOSE SERPL-MCNC: 111 MG/DL (ref 70–110)
HCO3 UR-SCNC: 28.5 MMOL/L (ref 24–28)
HCT VFR BLD AUTO: 30.6 %
HCT VFR BLD CALC: 30 %PCV (ref 36–54)
HGB BLD-MCNC: 10.2 G/DL
MAGNESIUM SERPL-MCNC: 1.9 MG/DL
PCO2 BLDA: 51.9 MMHG (ref 35–45)
PH SMN: 7.35 [PH] (ref 7.35–7.45)
PO2 BLDA: 90 MMHG (ref 80–100)
POC ACTIVATED CLOTTING TIME K: 120 SEC (ref 74–137)
POC ACTIVATED CLOTTING TIME K: 131 SEC (ref 74–137)
POC ACTIVATED CLOTTING TIME K: 241 SEC (ref 74–137)
POC ACTIVATED CLOTTING TIME K: 252 SEC (ref 74–137)
POC BE: 3 MMOL/L
POC IONIZED CALCIUM: 1.17 MMOL/L (ref 1.06–1.42)
POC SATURATED O2: 96 % (ref 95–100)
POC TCO2: 30 MMOL/L (ref 23–27)
POTASSIUM BLD-SCNC: 3.7 MMOL/L (ref 3.5–5.1)
POTASSIUM SERPL-SCNC: 4.5 MMOL/L
SAMPLE: ABNORMAL
SAMPLE: NORMAL
SAMPLE: NORMAL
SODIUM BLD-SCNC: 141 MMOL/L (ref 136–145)
SODIUM SERPL-SCNC: 138 MMOL/L

## 2018-03-21 PROCEDURE — 85014 HEMATOCRIT: CPT

## 2018-03-21 PROCEDURE — 94761 N-INVAS EAR/PLS OXIMETRY MLT: CPT

## 2018-03-21 PROCEDURE — 25000242 PHARM REV CODE 250 ALT 637 W/ HCPCS: Performed by: INTERNAL MEDICINE

## 2018-03-21 PROCEDURE — 63600175 PHARM REV CODE 636 W HCPCS: Performed by: INTERNAL MEDICINE

## 2018-03-21 PROCEDURE — 25000003 PHARM REV CODE 250: Performed by: INTERNAL MEDICINE

## 2018-03-21 PROCEDURE — 94640 AIRWAY INHALATION TREATMENT: CPT

## 2018-03-21 PROCEDURE — 85018 HEMOGLOBIN: CPT

## 2018-03-21 PROCEDURE — 99900035 HC TECH TIME PER 15 MIN (STAT)

## 2018-03-21 PROCEDURE — 83735 ASSAY OF MAGNESIUM: CPT

## 2018-03-21 PROCEDURE — 80048 BASIC METABOLIC PNL TOTAL CA: CPT

## 2018-03-21 PROCEDURE — 25000003 PHARM REV CODE 250: Performed by: NURSE PRACTITIONER

## 2018-03-21 PROCEDURE — 63600175 PHARM REV CODE 636 W HCPCS: Performed by: NURSE PRACTITIONER

## 2018-03-21 RX ORDER — FUROSEMIDE 10 MG/ML
20 INJECTION INTRAMUSCULAR; INTRAVENOUS ONCE
Status: COMPLETED | OUTPATIENT
Start: 2018-03-21 | End: 2018-03-21

## 2018-03-21 RX ADMIN — FUROSEMIDE 20 MG: 10 INJECTION, SOLUTION INTRAMUSCULAR; INTRAVENOUS at 09:03

## 2018-03-21 RX ADMIN — LEVOTHYROXINE SODIUM 125 MCG: 125 TABLET ORAL at 05:03

## 2018-03-21 RX ADMIN — PANTOPRAZOLE SODIUM 20 MG: 20 TABLET, DELAYED RELEASE ORAL at 09:03

## 2018-03-21 RX ADMIN — DIGOXIN 250 MCG: 250 TABLET ORAL at 09:03

## 2018-03-21 RX ADMIN — IPRATROPIUM BROMIDE AND ALBUTEROL SULFATE 3 ML: .5; 3 SOLUTION RESPIRATORY (INHALATION) at 07:03

## 2018-03-21 RX ADMIN — ALBUTEROL SULFATE 1 PUFF: 90 AEROSOL, METERED RESPIRATORY (INHALATION) at 05:03

## 2018-03-21 RX ADMIN — CEFAZOLIN SODIUM 2 G: 1 INJECTION, POWDER, FOR SOLUTION INTRAMUSCULAR; INTRAVENOUS at 01:03

## 2018-03-21 RX ADMIN — APIXABAN 5 MG: 5 TABLET, FILM COATED ORAL at 09:03

## 2018-03-21 RX ADMIN — ASPIRIN 81 MG: 81 TABLET, COATED ORAL at 09:03

## 2018-03-21 NOTE — PLAN OF CARE
Problem: Patient Care Overview  Goal: Plan of Care Review  Outcome: Ongoing (interventions implemented as appropriate)  Pt tolerated care s/p TVAR and has remained stable.  Pt has progressed as charted and has successfully ambulated approx 10 feet in room without distress x2.    Pt has sat up in chair without distress.  VSS.  Pt had voided freely and had a BM as charted. Pt aware of plan to discharge to home tomorrow if she remains stable.  Pt's only question is what to do with eloquist dosing and will ask MD tomorrow.

## 2018-03-21 NOTE — ASSESSMENT & PLAN NOTE
CAD s/p CABG  [facility] (2/22/2018): patent SVG-RCA, non-obstructive CAD.  On ASA, statin, and b-blocker. SL Nitro prn.

## 2018-03-21 NOTE — PROGRESS NOTES
[facility]  Interventional Cardiology  Progress Note    Patient Name: Nu Lee  MRN: [id number]  Admission Date: 3/20/2018  Hospital Length of Stay: 1 days  Code Status: Full Code   Attending Physician: Ovidio Romero MD   Primary Care Physician: Michela Christian MD  Principal Problem:Nodular calcific aortic valve stenosis    Subjective:     Interval History: s/p TAVR. Pt ambulated yesterday without complication. PPM in situ, no TVP. No acute events overnight.     Objective:     Vital Signs (Most Recent):  Temp: 97.8 °F (36.6 °C) (03/21/18 0300)  Pulse: 70 (03/21/18 0724)  Resp: 18 (03/21/18 0724)  BP: 138/65 (03/21/18 0600)  SpO2: 96 % (03/21/18 0724) Vital Signs (24h Range):  Temp:  [97.7 °F (36.5 °C)-98.7 °F (37.1 °C)] 97.8 °F (36.6 °C)  Pulse:  [69-91] 70  Resp:  [10-47] 18  SpO2:  [93 %-100 %] 96 %  BP: (107-138)/(56-68) 138/65  Arterial Line BP: ()/(44-58) 125/58     Weight: 85.9 kg (189 lb 6 oz)  Body mass index is 34.64 kg/m².    SpO2: 96 %  O2 Device (Oxygen Therapy): room air      Intake/Output Summary (Last 24 hours) at 03/21/18 0912  Last data filed at 03/21/18 0600   Gross per 24 hour   Intake             2110 ml   Output             1000 ml   Net             1110 ml       Lines/Drains/Airways     Peripheral Intravenous Line                 Peripheral IV - Single Lumen 03/20/18 0913 Left Antecubital less than 1 day         Peripheral IV - Single Lumen 03/20/18 1215 Left;Medial Antecubital less than 1 day                Physical Exam   Constitutional: She is oriented to person, place, and time. She appears well-developed and well-nourished. No distress.   HENT:   Head: Normocephalic and atraumatic.   Mouth/Throat: Oropharynx is clear and moist.   Eyes: Conjunctivae and EOM are normal. Pupils are equal, round, and reactive to light. No scleral icterus.   Neck: Neck supple. No JVD present. No tracheal deviation present.   Cardiovascular: Normal rate and regular  rhythm.  Exam reveals no gallop and no friction rub.    No murmur heard.  Pulmonary/Chest: Effort normal. No respiratory distress. She has no wheezes. She has rales. She exhibits no tenderness.   L chest PPM incision    Abdominal: Soft. Bowel sounds are normal. She exhibits distension. There is no hepatosplenomegaly. There is no tenderness.   Musculoskeletal: She exhibits no edema or tenderness.   Neurological: She is alert and oriented to person, place, and time.   Skin: Skin is warm and dry. No rash noted. No erythema.   Psychiatric: She has a normal mood and affect. Her behavior is normal.       Significant Labs:   BMP:   Recent Labs  Lab 03/19/18  1513 03/20/18  1243 03/21/18  0537   GLU 68* 119* 111*    140 138   K 3.7 3.4* 4.5    104 107   CO2 27 26 23   BUN 17 18 12   CREATININE 1.1 1.0 0.9   CALCIUM 10.7* 8.8 8.8   MG  --  1.6 1.9   , CMP   Recent Labs  Lab 03/19/18  1513 03/20/18  1243 03/21/18  0537    140 138   K 3.7 3.4* 4.5    104 107   CO2 27 26 23   GLU 68* 119* 111*   BUN 17 18 12   CREATININE 1.1 1.0 0.9   CALCIUM 10.7* 8.8 8.8   ANIONGAP 11 10 8   ESTGFRAFRICA 54.4* >60.0 >60.0   EGFRNONAA 47.2* 53.0* >60.0    and CBC   Recent Labs  Lab 03/19/18  1513  03/20/18  1453 03/21/18  0537   WBC 9.90  --   --   --    HGB 12.3  --  10.1* 10.2*   HCT 36.2*  < > 29.9* 30.6*     --   --   --    < > = values in this interval not displayed.    Significant Imaging: EKG:  Ventricular paced rhythm     Assessment and Plan:     Patient is a 82 y.o. female presenting with:    * Nodular calcific aortic valve stenosis    S/p 26mm S3 Delisa TAVR via RTF access.  On ASA and apixaban for A Fib        CKD (chronic kidney disease), stage III    Improved  GFR 47.2 on admission, now GFR 60         Atherosclerosis of aorta    Seen on TAVR CTA.   Stable.  On ASA and statin.         Acute on chronic diastolic heart failure    Clinically volume overloaded on exam, fluid balance +1.1L   Diuresis with  Lasix IV x1 and continue PO Lasix at discharge         Hypothyroidism    Stable, on synthroid.         CAD (coronary artery disease)    CAD s/p CABG  [facility] (2/22/2018): patent SVG-RCA, non-obstructive CAD.  On ASA, statin, and b-blocker. SL Nitro prn.         Ascending aortic aneurysm    Asc Aortic Aneurysm (4.8cm per patient) s/p repair (2009)        Type 2 diabetes mellitus with stage 3 chronic kidney disease, with long-term current use of insulin    Stable.   Insulin-dependent.   On SSI in-patient         Pacemaker 2007    Dual-chamber Biotronik Eluna 8 DR-T proMRI  Placed in 2007, Generator change in Aug 2017.        Chronic atrial fibrillation    Chronic, rate-controlled on digoxin.   On Eliquis.         Other hyperlipidemia    Stable.   On statin.         Essential (primary) hypertension    Controlled on current regimen.             VTE Risk Mitigation         Ordered     apixaban tablet 5 mg  2 times daily     Route:  Oral        03/21/18 0815        PLAN:  -Diuresis with Lasix IV x1 acute on chronic diastolic heart failure  -Ambulate with PT  -Consider d/c home this afternoon if stable   -Resume home apixaban     Hayde Frank NP  Interventional Cardiology  [facility]

## 2018-03-21 NOTE — NURSING
1245:  Pt was readied for d/c'; piv's out; dischaerge unstructions given; home meds revieded as well as meds given in hops today.  Pt's dtr at [facility] and aware of home instructions/meds/s&s to report/fu labs and MD visits.  Pt was ready to leave but dtr had to get vehicle from garage.    Approx 1400 pt was placed in WC and left unit with this RN to go wait for pt's dtr at ED entrance since it had been a long time since she left.      Approx 1415 - pt's dtr arrived at ED entrance and pt was placed in her van

## 2018-03-21 NOTE — DISCHARGE SUMMARY
[facility]  Interventional Cardiology  Discharge Summary      Patient Name: Nu Lee  MRN: [id number]  Admission Date: 3/20/2018  Hospital Length of Stay: 1 days  Discharge Date and Time:  03/21/2018 9:26 AM  Attending Physician: Ovidio Romero MD  Discharging Provider: Hayde Frank NP  Primary Care Physician: Michela Christian MD    HPI:  Referring: Dr. Hooper     HPI  Nu Lee is a 82 y.o. female referred by Dr Hooper for evaluation of severe AS (NYHA Class III sx). She has a PMH of HTN, HLD DM2, CKD II/III, chronic A fib (on eliquis) s/p pacemaker, DVT (although patient is unsure if she ever had a DVT), CAD s/p CABG, Aortic stenosis s/p AVR (25mm Magna) by Dr. Ramos 2009,  Asc Aortic Aneurysm (4.8cm per patient) s/p repair (2009), GERD, fibromyalgia, bilateral knee replacement and arthritis.      She has undergone the following Delisa TAVR work-up:   · ECHO (Date 2/21/2018): CHARLOTTE= 0.8 cm2, iAVA 0.42 cm2/m2, MG= 60 mmHg, Peak Tyler= 4.7m/s, EF= 55%.   · Bicuspid Aortic Valve S/p AVR (25mm Magna) by Dr. Ramos 2009: True ID 23mm   · [facility] (2/22/2018): patent SVG-RCA, non-obstructive CAD  · STS: 7.4%   · Frailty: 2/4   · Iliacs are >8.5 on R and >7.3 on L  · LVOT per JDT: Area= 3.67 cm2, Avg Diam = 21.5 mm  · Incidental CT findings: None  · CTS risk assessment: High risk due to STS score, age, redo status and co-morbidities per Dr. Bueno   · PFTs: Needs   · EKG: A-fib with CVR, iRBBB     OK for 26mm Fariba S3 Delisa TAVR via R TF access.        Procedure(s) (LRB):  REPLACEMENT-VALVE-AORTIC (N/A)     Indwelling Lines/Drains at time of discharge:  Lines/Drains/Airways          No matching active lines, drains, or airways          Hospital Course:  Ms. Lee was admitted and underwent successful placement of a 26mm S3 Delisa TAVR via RTF access. There were no complications and she was taken to the CCU in stable condition. TVP was removed post TAVR as patient has PPM.  She remained stable overnight. The following morning, she required diuresis for acute on chronic diastolic heart failure. That morning, she ambulated in the [place] without difficulty. She was eager to go home. It was felt she was stable for discharge. She will be discharged on ASA alone as she is also on eliquis for A Fib.           Significant Diagnostic Studies: Labs:   BMP:   Recent Labs  Lab 03/19/18  1513 03/20/18  1243 03/21/18  0537   GLU 68* 119* 111*    140 138   K 3.7 3.4* 4.5    104 107   CO2 27 26 23   BUN 17 18 12   CREATININE 1.1 1.0 0.9   CALCIUM 10.7* 8.8 8.8   MG  --  1.6 1.9   , CMP   Recent Labs  Lab 03/19/18  1513 03/20/18  1243 03/21/18  0537    140 138   K 3.7 3.4* 4.5    104 107   CO2 27 26 23   GLU 68* 119* 111*   BUN 17 18 12   CREATININE 1.1 1.0 0.9   CALCIUM 10.7* 8.8 8.8   ANIONGAP 11 10 8   ESTGFRAFRICA 54.4* >60.0 >60.0   EGFRNONAA 47.2* 53.0* >60.0    and CBC   Recent Labs  Lab 03/19/18  1513  03/20/18  1453 03/21/18  0537   WBC 9.90  --   --   --    HGB 12.3  --  10.1* 10.2*   HCT 36.2*  < > 29.9* 30.6*     --   --   --    < > = values in this interval not displayed.    Pending Diagnostic Studies:     None        * Nodular calcific aortic valve stenosis    S/p 26mm S3 Delisa TAVR via RTF access.  On ASA and apixaban for A Fib        CKD (chronic kidney disease), stage III    Improved  GFR 47.2 on admission, now GFR 60         Atherosclerosis of aorta    Seen on TAVR CTA.   Stable.  On ASA and statin.         Acute on chronic diastolic heart failure    Clinically volume overloaded on exam, fluid balance +1.1L   Diuresis with Lasix IV x1 and continue PO Lasix at discharge         Hypothyroidism    Stable, on synthroid.         CAD (coronary artery disease)    CAD s/p CABG  [facility] (2/22/2018): patent SVG-RCA, non-obstructive CAD.  On ASA, statin, and b-blocker. SL Nitro prn.         Ascending aortic aneurysm    Asc Aortic Aneurysm (4.8cm per patient) s/p repair  (2009)        Type 2 diabetes mellitus with stage 3 chronic kidney disease, with long-term current use of insulin    Stable.   Insulin-dependent.   On SSI in-patient         Pacemaker 2007    Dual-chamber Biotronik Eluna 8 -MICHAEL proMRI  Placed in 2007, Generator change in Aug 2017.        Chronic atrial fibrillation    Chronic, rate-controlled on digoxin.   On Eliquis.         Other hyperlipidemia    Stable.   On statin.         Essential (primary) hypertension    Controlled on current regimen.             Discharged Condition: stable    Follow Up:  F/u in 1 month in [facility]     Patient Instructions:     Call MD for:  temperature >100.4     Call MD for:  persistent nausea and vomiting or diarrhea     Call MD for:  severe uncontrolled pain     Call MD for:  redness, tenderness, or signs of infection (pain, swelling, redness, odor or green/yellow discharge around incision site)     Call MD for:  difficulty breathing or increased cough     Call MD for:  severe persistent headache     Call MD for:  worsening rash     Call MD for:  persistent dizziness, light-headedness, or visual disturbances     Call MD for:  increased confusion or weakness     No dressing needed     No driving until:   Order Comments: 5 days post TAVR     Weight bearing restrictions (specify)   Scheduling Instructions: Do not lift >5 lbs for 5 days       Medications:  Reconciled Home Medications:   Current Discharge Medication List      CONTINUE these medications which have NOT CHANGED    Details   albuterol (VENTOLIN HFA) 90 mcg/actuation inhaler Inhale into the lungs.      ALPRAZolam (XANAX) 0.25 MG tablet Take 0.25 mg by mouth once daily.       aspirin (ECOTRIN) 81 MG EC tablet Take 81 mg by mouth once daily.      benazepril (LOTENSIN) 10 MG tablet Take 10 mg by mouth once daily.       CALCIUM CARB/MAG OXIDE/CU/ZINC (CALCIUM-MAGNESIUM-COPPER-ZINC ORAL) Take 1 tablet by mouth once daily.      cinnamon bark (CINNAMON) 500 mg capsule Take  1,000 mg by mouth 2 (two) times daily.      cyanocobalamin 500 MCG tablet Take 1 tablet by mouth.      digoxin (LANOXIN) 250 mcg tablet Take 250 mcg by mouth once daily.       docusate sodium (COLACE) 50 MG capsule Take 100 mg by mouth 2 (two) times daily.       escitalopram oxalate (LEXAPRO) 20 MG tablet Take 0.5 tablets (10 mg total) by mouth once daily.      furosemide (LASIX) 40 MG tablet Take 40 mg by mouth 2 (two) times daily.       hydrocodone-acetaminophen 5-325mg (NORCO) 5-325 mg per tablet       ipratropium-albuterol (COMBIVENT)  mcg/actuation inhaler Inhale into the lungs.      levothyroxine (SYNTHROID) 125 MCG tablet Take 125 mcg by mouth before breakfast.       magnesium oxide (MAG-OX) 250 mg Tab Take 400 mg by mouth.      metoprolol succinate (TOPROL-XL) 100 MG 24 hr tablet Take 1 tablet (100 mg total) by mouth once daily.      NOVOLOG MIX 70-30 FLEXPEN 100 unit/mL (70-30) InPn pen Inject into the skin 2 (two) times daily. 35 units if glucose <150  40 units if glucose >150      omega-3 fatty acids-fish oil 340-1,000 mg Cap Take 1 capsule by mouth once daily.  Qty: 90 capsule, Refills: 3      pantoprazole (PROTONIX) 20 MG tablet Take 20 mg by mouth once daily.       potassium 99 mg Tab Take 1 tablet by mouth once daily.      potassium gluconate 2.5 mEq Tab Take 1 tablet by mouth.      ranitidine (ZANTAC) 300 MG capsule Take 300 mg by mouth nightly.      simvastatin (ZOCOR) 20 MG tablet Take 20 mg by mouth every evening.       traZODone (DESYREL) 100 MG tablet Take 50 mg by mouth every evening.       apixaban 5 mg Tab Take 1 tablet (5 mg total) by mouth 2 (two) times daily.  Qty: 180 tablet, Refills: 0      CONTOUR TEST STRIPS Strp USE TO TEST BLOOD SUGAR BID  Refills: 1      ibuprofen (ADVIL,MOTRIN) 200 MG tablet Take 200 mg by mouth every 6 (six) hours as needed for Pain.      nitroGLYCERIN (NITROSTAT) 0.4 MG SL tablet Place 0.4 mg under the tongue every 5 (five) minutes as needed for Chest pain.  "     ondansetron (ZOFRAN) 8 MG tablet Take 8 mg by mouth every 8 (eight) hours as needed for Nausea.       TRUE METRIX GLUCOSE METER Misc       TRUEPLUS LANCETS 28 gauge Misc       TRUEPLUS PEN NEEDLE 31 gauge x 1/4" Ndle              Time spent on the discharge of patient: 33 minutes    Hayde Frank NP  Interventional Cardiology  [facility]  "

## 2018-03-21 NOTE — DISCHARGE INSTRUCTIONS
Weigh yourself on the same scale every morning. If you gain more than 3 lbs in 1 day or more than 5 lbs in 1 week, or if you experience worsening shortness of breath, swelling in your feet or legs, or difficulty laying flat, take an extra Lasix pill until you reach your normal weight or your symptoms have resolved. If you have any questions, you can call our office at [phone].     You will need to take a single dose of antibiotics prior to certain dental procedures, colonoscopies, or bladder procedures. We can call this prescription in for you or the physician performing the procedure can call it in for you. If you have questions about whether or not a procedure will require antibiotics, you can call our office. Always let your physician know that you have an artificial heart valve.

## 2018-03-21 NOTE — PLAN OF CARE
03/21/18 1022   Final Note   Assessment Type Final Discharge Note   Discharge Disposition Home   What phone number can be called within the next 1-3 days to see how you are doing after discharge? [phone]   Hospital Follow Up  Appt(s) scheduled? Yes  (per interventional cards team and clinic RN)   Discharge plans and expectations educations in teach back method with documentation complete? Yes   Right Care Referral Info   Post Acute Recommendation No Care     Pt discharged home w/family. No needs.

## 2018-03-21 NOTE — ANESTHESIA POSTPROCEDURE EVALUATION
"Anesthesia Post Evaluation    Patient: Nu Lee    Procedure(s) Performed: Procedure(s) (LRB):  REPLACEMENT-VALVE-AORTIC (N/A)    Final Anesthesia Type: general  Patient location during evaluation: ICU  Patient participation: Yes- Able to Participate  Level of consciousness: awake and alert  Post-procedure vital signs: reviewed and stable  Pain management: adequate  Airway patency: patent  PONV status at discharge: No PONV  Anesthetic complications: no      Cardiovascular status: blood pressure returned to baseline and hemodynamically stable  Respiratory status: unassisted and spontaneous ventilation  Hydration status: euvolemic  Follow-up not needed.        Visit Vitals  /71 (BP Location: Right arm, Patient Position: Lying)   Pulse 90   Temp 36.8 °C (98.3 °F) (Oral)   Resp (!) 48   Ht 5' 2" (1.575 m)   Wt 85.9 kg (189 lb 6 oz)   LMP  (LMP Unknown)   SpO2 100%   Breastfeeding? No   BMI 34.64 kg/m²       Pain/Rose Marie Score: Pain Assessment Performed: Yes (3/21/2018 11:00 AM)  Presence of Pain: denies (3/21/2018 11:00 AM)      "

## 2018-03-21 NOTE — ASSESSMENT & PLAN NOTE
Clinically volume overloaded on exam, fluid balance +1.1L   Diuresis with Lasix IV x1 and continue PO Lasix at discharge

## 2018-03-21 NOTE — SUBJECTIVE & OBJECTIVE
Interval History: s/p TAVR. Pt ambulated yesterday without complication. PPM in situ, no TVP. No acute events overnight.     Objective:     Vital Signs (Most Recent):  Temp: 97.8 °F (36.6 °C) (03/21/18 0300)  Pulse: 70 (03/21/18 0724)  Resp: 18 (03/21/18 0724)  BP: 138/65 (03/21/18 0600)  SpO2: 96 % (03/21/18 0724) Vital Signs (24h Range):  Temp:  [97.7 °F (36.5 °C)-98.7 °F (37.1 °C)] 97.8 °F (36.6 °C)  Pulse:  [69-91] 70  Resp:  [10-47] 18  SpO2:  [93 %-100 %] 96 %  BP: (107-138)/(56-68) 138/65  Arterial Line BP: ()/(44-58) 125/58     Weight: 85.9 kg (189 lb 6 oz)  Body mass index is 34.64 kg/m².    SpO2: 96 %  O2 Device (Oxygen Therapy): room air      Intake/Output Summary (Last 24 hours) at 03/21/18 0912  Last data filed at 03/21/18 0600   Gross per 24 hour   Intake             2110 ml   Output             1000 ml   Net             1110 ml       Lines/Drains/Airways     Peripheral Intravenous Line                 Peripheral IV - Single Lumen 03/20/18 0913 Left Antecubital less than 1 day         Peripheral IV - Single Lumen 03/20/18 1215 Left;Medial Antecubital less than 1 day                Physical Exam   Constitutional: She is oriented to person, place, and time. She appears well-developed and well-nourished. No distress.   HENT:   Head: Normocephalic and atraumatic.   Mouth/Throat: Oropharynx is clear and moist.   Eyes: Conjunctivae and EOM are normal. Pupils are equal, round, and reactive to light. No scleral icterus.   Neck: Neck supple. No JVD present. No tracheal deviation present.   Cardiovascular: Normal rate and regular rhythm.  Exam reveals no gallop and no friction rub.    No murmur heard.  Pulmonary/Chest: Effort normal. No respiratory distress. She has no wheezes. She has rales. She exhibits no tenderness.   L chest PPM incision    Abdominal: Soft. Bowel sounds are normal. She exhibits distension. There is no hepatosplenomegaly. There is no tenderness.   Musculoskeletal: She exhibits no edema  or tenderness.   Neurological: She is alert and oriented to person, place, and time.   Skin: Skin is warm and dry. No rash noted. No erythema.   Psychiatric: She has a normal mood and affect. Her behavior is normal.       Significant Labs:   BMP:   Recent Labs  Lab 03/19/18  1513 03/20/18  1243 03/21/18  0537   GLU 68* 119* 111*    140 138   K 3.7 3.4* 4.5    104 107   CO2 27 26 23   BUN 17 18 12   CREATININE 1.1 1.0 0.9   CALCIUM 10.7* 8.8 8.8   MG  --  1.6 1.9   , CMP   Recent Labs  Lab 03/19/18  1513 03/20/18  1243 03/21/18  0537    140 138   K 3.7 3.4* 4.5    104 107   CO2 27 26 23   GLU 68* 119* 111*   BUN 17 18 12   CREATININE 1.1 1.0 0.9   CALCIUM 10.7* 8.8 8.8   ANIONGAP 11 10 8   ESTGFRAFRICA 54.4* >60.0 >60.0   EGFRNONAA 47.2* 53.0* >60.0    and CBC   Recent Labs  Lab 03/19/18  1513  03/20/18  1453 03/21/18  0537   WBC 9.90  --   --   --    HGB 12.3  --  10.1* 10.2*   HCT 36.2*  < > 29.9* 30.6*     --   --   --    < > = values in this interval not displayed.    Significant Imaging: EKG:  Ventricular paced rhythm

## 2018-03-23 ENCOUNTER — PATIENT OUTREACH (OUTPATIENT)
Dept: ADMINISTRATIVE | Facility: CLINIC | Age: 83
End: 2018-03-23

## 2018-03-23 NOTE — PATIENT INSTRUCTIONS
After Heart Valve Surgery: At Home  Your healthcare provider performed surgery to repair or replace one or more of your heart valves. These valves make sure that blood flows through your heart the right way. You had heart valve surgery to improve the flow of blood through your heart. It should also decrease or stop the symptoms you have been having.  Home care  · Take your medicines exactly as directed. Dont skip doses.  · Avoid using very hot water while showering. It can affect your circulation and make you dizzy.  · Clean your incision every day with soap and water. Gently pat dry the area of the incision. Dont use any powders, lotions, antibiotic creams, or oils on your incision until it is well healed. Healing takes several weeks.  · Weigh yourself every day, at the same time of day, and in the same kind of clothes.  · Tell your healthcare provider if you feel depressed, have trouble sleeping, or have a persistent decrease in appetite. These are common problems after surgery, but they can slow your recovery. Its important to seek help.  · Your healthcare provider may tell you to take antibiotics before having any dental work. Some people who have had heart valve surgery must take antibiotics before dental work. Be sure to talk with your healthcare provider about any special instructions for dental cleanings or procedures.  Activity  · Discuss with your healthcare provider what you can and cant do as you recover. You will have good and bad days. This is normal.  · Let others drive for the first 3-6 weeks after your surgery.  · Ask someone to stand nearby while you shower or do other activities, just in case you need help.  · Dont lift anything heavier than 5 pounds for 6-8 weeks. Your healthcare provider may give you a specific weight restriction.   · Until approved by your healthcare provider, avoid mowing the lawn, vacuuming, or other activities that could strain your breastbone.  · Ask your healthcare  provider when you can expect to return to work.  Lifestyle changes  · Maintain a healthy weight. Get help to lose any extra pounds.  · Cut back on salt.  ¨ Limit canned, dried, packaged, and fast foods.  ¨ Dont add salt to your food at the table.  ¨ Season foods with herbs instead of salt when you cook.  · Break the smoking habit. If you smoke, enroll in a stop-smoking program to improve your chances of success.  · Ask your healthcare provider when you can start a walking program.  ¨ If you havent already started a walking program in the hospital, begin with short walks (about 5 minutes) at home. Go a little longer each day.  ¨ Choose a safe place with a level surface, such as a local park or mall.  ¨ Wear supportive shoes to prevent injury to knees and ankles.  ¨ Walk with someone. Its more fun and helps you stay with it.  Follow-up  Make a follow-up appointment as directed by our staff.     When to call your healthcare provider  Call your healthcare provider immediately if you have any of the following:  · Chest pain or a return of the heart symptoms you had prior to surgery  · Fever above 100.4°F (38°C) or higher, or as directed by your healthcare provider  · Redness, swelling, drainage, or warmth at the incision site  · Shortness of breath  · Fainting  · Weight gain of more than 3 pounds in 24 hours or more than 5 pounds in 1 week(s)  · New or increased swelling in your hands, feet, or ankles  · Pain that cannot be relieved or changes in the location, type, or severity of pain  · Fast or irregular pulse  · Unrelieved pain in your incision   Date Last Reviewed: 10/1/2016  © 9716-0528 [employer]. [address] [place] [address], [place] [address]. All rights reserved. This information is not intended as a substitute for professional medical care. Always follow your healthcare professional's instructions.

## 2018-03-23 NOTE — PROGRESS NOTES
C3 nurse attempted to contact patient. No answer. The following message was left for the patient to return the call:  Good morning I am a nurse calling on behalf of [facility] from the [facility].  This is a Transitional Care Call for Nu. When you have a moment please contact us at [phone] or [phone] Monday through Friday, between the hours of 8 am to 4 pm. We look forward to speaking with you. On behalf of [facility] have a nice day.    The patient does not have a scheduled HOSFU appointment within 7-14 days post hospital discharge date 03/21/18. PCP is non [facility]

## 2018-03-24 ENCOUNTER — NURSE TRIAGE (OUTPATIENT)
Dept: ADMINISTRATIVE | Facility: CLINIC | Age: 83
End: 2018-03-24

## 2018-03-24 ENCOUNTER — HOSPITAL ENCOUNTER (EMERGENCY)
Facility: HOSPITAL | Age: 83
Discharge: HOME | End: 2018-03-24
Attending: EMERGENCY MEDICINE
Payer: MEDICARE

## 2018-03-24 VITALS
DIASTOLIC BLOOD PRESSURE: 59 MMHG | TEMPERATURE: 98 F | WEIGHT: 181 LBS | HEART RATE: 72 BPM | RESPIRATION RATE: 18 BRPM | BODY MASS INDEX: 33.31 KG/M2 | SYSTOLIC BLOOD PRESSURE: 109 MMHG | OXYGEN SATURATION: 98 % | HEIGHT: 62 IN

## 2018-03-24 DIAGNOSIS — Z95.0 PACEMAKER: ICD-10-CM

## 2018-03-24 DIAGNOSIS — I10 ESSENTIAL (PRIMARY) HYPERTENSION: ICD-10-CM

## 2018-03-24 DIAGNOSIS — E11.9 DM2 (DIABETES MELLITUS, TYPE 2): ICD-10-CM

## 2018-03-24 DIAGNOSIS — I10 HTN (HYPERTENSION): ICD-10-CM

## 2018-03-24 DIAGNOSIS — Z48.89 ENCOUNTER FOR POST SURGICAL WOUND CHECK: Primary | ICD-10-CM

## 2018-03-24 DIAGNOSIS — I72.9 PSEUDOANEURYSM: ICD-10-CM

## 2018-03-24 DIAGNOSIS — N18.30 CKD (CHRONIC KIDNEY DISEASE), STAGE III: ICD-10-CM

## 2018-03-24 PROBLEM — R58 ECCHYMOSIS: Status: ACTIVE | Noted: 2018-03-24

## 2018-03-24 PROCEDURE — 99283 EMERGENCY DEPT VISIT LOW MDM: CPT | Mod: ,,, | Performed by: NURSE PRACTITIONER

## 2018-03-24 PROCEDURE — 99284 EMERGENCY DEPT VISIT MOD MDM: CPT

## 2018-03-24 NOTE — CONSULTS
[facility]  Interventional Cardiology  Consult Note    Patient Name: Nu Lee  MRN: [id number]  Admission Date: 3/24/2018  Hospital Length of Stay: 0 days  Code Status: Prior   Referring Provider: Miracle Pineda MD  Consulting Provider: Jorge Torres MD  Primary Care Physician: Michela Christian MD  Principal Problem:<principal problem not specified>    Patient information was obtained from patient and ER records.     Consults  Subjective:     Chief Complaint:  Right groin ecchymosis     HPI:  Nu Lee is a 82 y.o. female referred by Dr Hooper for evaluation of severe AS (NYHA Class III sx). She has a PMH of HTN, HLD DM2, CKD II/III, chronic A fib (on eliquis) s/p pacemaker, DVT (although patient is unsure if she ever had a DVT), CAD s/p CABG, Aortic stenosis s/p AVR (25mm Magna) by Dr. Ramos 2009,  Asc Aortic Aneurysm (4.8cm per patient) s/p repair (2009), GERD, fibromyalgia, bilateral knee replacement and arthritis. She underwent RTF ROQUE placement on 3/20/18 with no issues. She had small area of ecchymosis of the right groin with mild soreness and TTP but was planning of resuming her driving - got concerned and call on call attending and was advised to come to ED for examination.    Past Medical History:   Diagnosis Date    Arthritis     Coronary artery disease     Diverticulitis     GERD (gastroesophageal reflux disease)     Hyperlipidemia     Hypertension        Past Surgical History:   Procedure Laterality Date    CARDIAC CATHETERIZATION      CARDIAC PACEMAKER PLACEMENT  2007    CHOLECYSTECTOMY      CORONARY ARTERY BYPASS GRAFT      HYSTERECTOMY      TOTAL KNEE ARTHROPLASTY Right     TOTAL KNEE ARTHROPLASTY Left        Review of patient's allergies indicates:  No Known Allergies      (Not in a hospital admission)  Family History     None        Social History Main Topics    Smoking status: Former Smoker    Smokeless tobacco: Never Used     Alcohol use No    Drug use: No    Sexual activity: No     Review of Systems   Constitution: Negative for chills, decreased appetite, fever, weakness, night sweats, weight gain and weight loss.   HENT: Negative for congestion, hoarse voice, stridor and tinnitus.    Eyes: Negative for blurred vision, pain and visual disturbance.   Cardiovascular: Negative for chest pain, claudication, dyspnea on exertion, irregular heartbeat, leg swelling, near-syncope, orthopnea, palpitations, paroxysmal nocturnal dyspnea and syncope.        Right groin ecchymosis   Respiratory: Negative for cough, hemoptysis, shortness of breath, snoring and wheezing.    Endocrine: Negative for cold intolerance, heat intolerance and polyuria.   Hematologic/Lymphatic: Negative for bleeding problem. Does not bruise/bleed easily.   Skin: Negative for color change and rash.   Musculoskeletal: Negative for arthritis, back pain, joint pain, muscle cramps, myalgias and stiffness.   Gastrointestinal: Negative for abdominal pain, anorexia, constipation, diarrhea, dysphagia, heartburn, hemorrhoids, melena, nausea and vomiting.   Genitourinary: Negative for frequency, hematuria, hesitancy, nocturia and urgency.   Neurological: Negative for difficulty with concentration, dizziness, headaches, light-headedness, numbness, seizures, tremors and vertigo.   Psychiatric/Behavioral: Negative for altered mental status and depression. The patient does not have insomnia.    Allergic/Immunologic: Negative for hives.     Objective:     Vital Signs (Most Recent):  Temp: 97.7 °F (36.5 °C) (03/24/18 1741)  Pulse: 72 (03/24/18 1741)  Resp: 18 (03/24/18 1741)  BP: (!) 109/59 (03/24/18 1741)  SpO2: 98 % (03/24/18 1741) Vital Signs (24h Range):  Temp:  [97.3 °F (36.3 °C)-97.7 °F (36.5 °C)] 97.7 °F (36.5 °C)  Pulse:  [72-73] 72  Resp:  [18] 18  SpO2:  [98 %] 98 %  BP: (109-113)/(59-73) 109/59     Weight: 82.1 kg (181 lb)  Body mass index is 33.11 kg/m².    SpO2: 98 %  O2  Device (Oxygen Therapy): room air    No intake or output data in the 24 hours ending 03/24/18 1832    Lines/Drains/Airways          No matching active lines, drains, or airways          Physical Exam   Constitutional: She appears well-developed and well-nourished.   HENT:   Head: Normocephalic and atraumatic.   Right Ear: External ear normal.   Left Ear: External ear normal.   Eyes: Conjunctivae and EOM are normal. Pupils are equal, round, and reactive to light.   Neck: Normal range of motion. Neck supple. No JVD present. No thyromegaly present.   Cardiovascular: Normal rate, regular rhythm, S1 normal, S2 normal and intact distal pulses.  Exam reveals no gallop, no S3 and no friction rub.    Murmur heard.  Pulses:       Radial pulses are 2+ on the right side, and 2+ on the left side.        Femoral pulses are 2+ on the right side, and 2+ on the left side.       Dorsalis pedis pulses are 2+ on the right side, and 2+ on the left side.        Posterior tibial pulses are 2+ on the right side, and 2+ on the left side.   Right groin ecchymosis with some clotted blood superficially. 2x3 cm ecchymosis. Distal pulses intact. No bruit or thrill appreciated.   Pulmonary/Chest: Effort normal. No stridor. She has no wheezes. She has no rales. She exhibits no tenderness.   Abdominal: Soft. Bowel sounds are normal. She exhibits no distension. There is no tenderness. There is no rebound and no guarding.   Musculoskeletal: Normal range of motion. She exhibits no edema or tenderness.   Psychiatric: She has a normal mood and affect.       Significant Labs: No new labs to review        Assessment and Plan:     Ecchymosis    Right groin without any bruit or thrill. Less likely vascular pseudoaneurysm but will obtain USG right groin.  Patient reassured and agree with conservative management with tylenol prn.    If USG shows pseudoaneurysm then will compress with manual pressure before discharge. If USG normal - safely can be discharged  home with PRN tylenol. No restriction in activities needed.          H/O prosthetic aortic valve replacement    Patient s/p successful Delisa TAVR for failing aortic bioprosthesis with a 26mm S3 via RTF access. No angina or CHF symptoms reported. Subjectively feeling excellent.         Chronic atrial fibrillation    Continue with ASA and Xarelto for anticoagulation. No issues.            VTE Risk Mitigation     None          Thank you for your consult. I will sign off. Please contact us if you have any additional questions.    Jorge Torres MD  Interventional Cardiology   [facility]

## 2018-03-24 NOTE — TELEPHONE ENCOUNTER
"Caller stated that the patient has increased right groin swelling (nickel sized) accompanied by bruising that was noticed today. Had an aortic valve replacement through the groin on Tuesday and was discharged Wednesday. Notified Dr. Agrawal who reported that the patient should be evaluated in the ED  Informed the caller and she verbalized understanding. Instructed to call back with any additional problems and/or concerns    Reason for Disposition   Other post-op symptom or question (all triage questions negative)    Answer Assessment - Initial Assessment Questions  1. SYMPTOM: "What's the main symptom you're concerned about?" (e.g., pain, fever, vomiting)      Right groin swelling  2. ONSET: "When did ________  start?"      today  3. SURGERY: "What surgery was performed?"      Aortic valve replacement through the groin  4. DATE of SURGERY: "When was surgery performed?"        tuesday  5. ANESTHESIA: " What type of anesthesia did you have?" (e.g., general, spinal, epidural, local)        general  6. PAIN: "Is there any pain?" If so, ask: "How bad is it?"  (Scale 1-10; or mild, moderate, severe)       4-5/10  7. FEVER: "Do you have a fever?" If so, ask: "What is your temperature, how was it measured, and when did it start?"      no  8. VOMITING: "Is there any vomiting?" If yes, ask: "How many times?"      no  9. BLEEDING: "Is there any bleeding?" If so, ask: "How much?" and "Where?"      no  10. OTHER SYMPTOMS: "Do you have any other symptoms?" (e.g., drainage from wound, painful urination, constipation)        no    Protocols used: ST POST-OP SYMPTOMS AND [id number]      "

## 2018-03-24 NOTE — ASSESSMENT & PLAN NOTE
Right groin without any bruit or thrill. Less likely vascular pseudoaneurysm but will obtain USG right groin.  Patient reassured and agree with conservative management with tylenol prn.

## 2018-03-24 NOTE — HPI
Nu Lee is a 82 y.o. female referred by Dr Hooper for evaluation of severe AS (NYHA Class III sx). She has a PMH of HTN, HLD DM2, CKD II/III, chronic A fib (on eliquis) s/p pacemaker, DVT (although patient is unsure if she ever had a DVT), CAD s/p CABG, Aortic stenosis s/p AVR (25mm Magna) by Dr. Ramos 2009,  Asc Aortic Aneurysm (4.8cm per patient) s/p repair (2009), GERD, fibromyalgia, bilateral knee replacement and arthritis. She underwent RTF ROQUE placement on 3/20/18 with no issues. She had small area of ecchymosis of the right groin with mild soreness and TTP but was planning of resuming her driving - got concerned and call on call attending and was advised to come to ED for examination.

## 2018-03-24 NOTE — SUBJECTIVE & OBJECTIVE
Past Medical History:   Diagnosis Date    Arthritis     Coronary artery disease     Diverticulitis     GERD (gastroesophageal reflux disease)     Hyperlipidemia     Hypertension        Past Surgical History:   Procedure Laterality Date    CARDIAC CATHETERIZATION      CARDIAC PACEMAKER PLACEMENT  2007    CHOLECYSTECTOMY      CORONARY ARTERY BYPASS GRAFT      HYSTERECTOMY      TOTAL KNEE ARTHROPLASTY Right     TOTAL KNEE ARTHROPLASTY Left        Review of patient's allergies indicates:  No Known Allergies      (Not in a hospital admission)  Family History     None        Social History Main Topics    Smoking status: Former Smoker    Smokeless tobacco: Never Used    Alcohol use No    Drug use: No    Sexual activity: No     Review of Systems   Constitution: Negative for chills, decreased appetite, fever, weakness, night sweats, weight gain and weight loss.   HENT: Negative for congestion, hoarse voice, stridor and tinnitus.    Eyes: Negative for blurred vision, pain and visual disturbance.   Cardiovascular: Negative for chest pain, claudication, dyspnea on exertion, irregular heartbeat, leg swelling, near-syncope, orthopnea, palpitations, paroxysmal nocturnal dyspnea and syncope.        Right groin ecchymosis   Respiratory: Negative for cough, hemoptysis, shortness of breath, snoring and wheezing.    Endocrine: Negative for cold intolerance, heat intolerance and polyuria.   Hematologic/Lymphatic: Negative for bleeding problem. Does not bruise/bleed easily.   Skin: Negative for color change and rash.   Musculoskeletal: Negative for arthritis, back pain, joint pain, muscle cramps, myalgias and stiffness.   Gastrointestinal: Negative for abdominal pain, anorexia, constipation, diarrhea, dysphagia, heartburn, hemorrhoids, melena, nausea and vomiting.   Genitourinary: Negative for frequency, hematuria, hesitancy, nocturia and urgency.   Neurological: Positive for focal weakness. Negative for difficulty with  concentration, dizziness, headaches, light-headedness, numbness, seizures, tremors and vertigo.   Psychiatric/Behavioral: Negative for altered mental status and depression. The patient does not have insomnia.    Allergic/Immunologic: Negative for hives.     Objective:     Vital Signs (Most Recent):  Temp: 97.7 °F (36.5 °C) (03/24/18 1741)  Pulse: 72 (03/24/18 1741)  Resp: 18 (03/24/18 1741)  BP: (!) 109/59 (03/24/18 1741)  SpO2: 98 % (03/24/18 1741) Vital Signs (24h Range):  Temp:  [97.3 °F (36.3 °C)-97.7 °F (36.5 °C)] 97.7 °F (36.5 °C)  Pulse:  [72-73] 72  Resp:  [18] 18  SpO2:  [98 %] 98 %  BP: (109-113)/(59-73) 109/59     Weight: 82.1 kg (181 lb)  Body mass index is 33.11 kg/m².    SpO2: 98 %  O2 Device (Oxygen Therapy): room air    No intake or output data in the 24 hours ending 03/24/18 1832    Lines/Drains/Airways          No matching active lines, drains, or airways          Physical Exam   Constitutional: She appears well-developed and well-nourished.   HENT:   Head: Normocephalic and atraumatic.   Right Ear: External ear normal.   Left Ear: External ear normal.   Eyes: Conjunctivae and EOM are normal. Pupils are equal, round, and reactive to light.   Neck: Normal range of motion. Neck supple. No JVD present. No thyromegaly present.   Cardiovascular: Normal rate, regular rhythm, S1 normal, S2 normal and intact distal pulses.  Exam reveals no gallop, no S3 and no friction rub.    Murmur heard.  Pulses:       Radial pulses are 2+ on the right side, and 2+ on the left side.        Femoral pulses are 2+ on the right side, and 2+ on the left side.       Dorsalis pedis pulses are 2+ on the right side, and 2+ on the left side.        Posterior tibial pulses are 2+ on the right side, and 2+ on the left side.   Right groin ecchymosis with some clotted blood superficially. 2x3 cm ecchymosis. Distal pulses intact. No bruit or thrill appreciated.   Pulmonary/Chest: Effort normal. No stridor. She has no wheezes. She has  no rales. She exhibits no tenderness.   Abdominal: Soft. Bowel sounds are normal. She exhibits no distension. There is no tenderness. There is no rebound and no guarding.   Musculoskeletal: Normal range of motion. She exhibits no edema or tenderness.   Psychiatric: She has a normal mood and affect.       Significant Labs: No new labs to review

## 2018-03-24 NOTE — ED PROVIDER NOTES
Encounter Date: 3/24/2018    SCRIBE #1 NOTE: I, Lynn Borja, am scribing for, and in the presence of,  Dr. Pineda . I have scribed the following portions of the note - the APC attestation.       History     Chief Complaint   Patient presents with    Groin Swelling     dc'd wed , had valve replaced from my groin, no r groin is little more swollen     Pt is an 83 y/o female with medical history of HTN, HLD DM2, CKD II/III, chronic A fib s/p pacemaker, DVT, CAD s/p CABG, Aortic stenosis s/p prosthetic valve replacement 2009, GERD, TAVR approximately one week ago, and arthritis presenting to the ED for wound check.  Daughter states that her right groin access site  is bruising and larger than it was upon discharge.  Pt states she was told from on call to come to ED for evaluation of stent placement site.  Pt denies any chest pain, weakness, dizziness, N/V/D, or fever           Review of patient's allergies indicates:  No Known Allergies  Past Medical History:   Diagnosis Date    Arthritis     Coronary artery disease     Diverticulitis     GERD (gastroesophageal reflux disease)     Hyperlipidemia     Hypertension      Past Surgical History:   Procedure Laterality Date    CARDIAC CATHETERIZATION      CARDIAC PACEMAKER PLACEMENT  2007    CHOLECYSTECTOMY      CORONARY ARTERY BYPASS GRAFT      HYSTERECTOMY      TOTAL KNEE ARTHROPLASTY Right     TOTAL KNEE ARTHROPLASTY Left      History reviewed. No pertinent family history.  Social History   Substance Use Topics    Smoking status: Former Smoker    Smokeless tobacco: Never Used    Alcohol use No     Review of Systems   Constitutional: Negative for activity change, appetite change, chills, fatigue and fever.   HENT: Negative for congestion, sinus pain, sinus pressure, sore throat and trouble swallowing.    Eyes: Negative for photophobia, pain and discharge.   Respiratory: Negative for apnea, cough, choking, chest tightness, shortness of breath, wheezing  and stridor.    Cardiovascular: Negative for chest pain, palpitations and leg swelling.   Gastrointestinal: Negative for abdominal distention, abdominal pain, constipation, diarrhea, nausea and vomiting.   Endocrine: Negative.    Genitourinary: Negative for difficulty urinating, dysuria, frequency and urgency.   Musculoskeletal: Negative for arthralgias, back pain, gait problem, joint swelling, myalgias, neck pain and neck stiffness.   Skin: Positive for wound ( access site to right groin). Negative for pallor and rash.   Allergic/Immunologic: Negative.    Neurological: Negative for dizziness, tremors, syncope, weakness, numbness and headaches.   Hematological: Negative for adenopathy.   Psychiatric/Behavioral: Negative.        Physical Exam     Initial Vitals [03/24/18 1607]   BP Pulse Resp Temp SpO2   113/73 73 18 97.3 °F (36.3 °C) 98 %      MAP       86.33         Physical Exam    Nursing note and vitals reviewed.  Constitutional: Vital signs are normal. She appears well-developed and well-nourished. She is cooperative. She does not have a sickly appearance. She does not appear ill. No distress.   HENT:   Head: Normocephalic and atraumatic.   Eyes: EOM are normal. Pupils are equal, round, and reactive to light.   Neck: Normal range of motion.   Cardiovascular: Normal rate, regular rhythm and intact distal pulses.   Murmur heard.  Pulses:       Radial pulses are 2+ on the right side, and 2+ on the left side.        Femoral pulses are 2+ on the right side, and 2+ on the left side.  Pulmonary/Chest: Effort normal and breath sounds normal.   Abdominal: Soft. Normal appearance and bowel sounds are normal. There is no tenderness.   Musculoskeletal: Normal range of motion.   Neurological: She is alert and oriented to person, place, and time. She has normal strength. GCS eye subscore is 4. GCS verbal subscore is 5. GCS motor subscore is 6.   Skin: Skin is warm and dry. Capillary refill takes less than 2 seconds.  Ecchymosis noted. No cyanosis. Nails show no clubbing.        Psychiatric: She has a normal mood and affect. Her speech is normal and behavior is normal. Judgment and thought content normal. Cognition and memory are normal.         ED Course   Procedures  Labs Reviewed - No data to display          Medical Decision Making:   History:   Old Medical Records: I decided to obtain old medical records.  Clinical Tests:   Radiological Study: Ordered and Reviewed       APC / Resident Notes:   Emergent evaluation of a 81 yo female patient presenting to the ER with chief complaint of right groin swelling.  Pt states she had a TAVR procedure completed on 3/20/18.  Pt states this morning her daughter noticed increased swelling and bruising to the access site.  Daughter states she contacted the on call and was advised to come to ED for assessment.  Pt daughter states the size of the access has changed from the size of a pea to the size of a nickel.  Pt denies any increased pain or increased drainage at insertion site. On exam, ecchymosis noted to right groin.  Breath sounds clear. I will consult cardiology, obtain ultrasound of site as per cardiology, and reassess.  Cardiology fellow to see pt.    1715- Cardiology at bedside.  Awaiting U/S results.   1900- U/S negative.  Spoke with Cardiology fellow.  Pt stable for d/c from their perspective.  Patient is hemodynamically stable, vital signs are normal. Discharge instructions given. Return to ED precautions discussed. Follow up as directed. Pt verbalized understanding of this plan. Pt is stable for discharge.          Scribe Attestation:   Scribe #1: I performed the above scribed service and the documentation accurately describes the services I performed. I attest to the accuracy of the note.    Attending Attestation:     Physician Attestation Statement for NP/PA:   I discussed this assessment and plan of this patient with the NP/PA, but I did not personally examine the patient.  The face to face encounter was performed by the NP/PA.                  ED Course as of Mar 24 2005   Sat Mar 24, 2018   1574 US Lower Extremity Arteries Right [RZ]      ED Course User Index  [RZ] Liliana Martínez NP     Clinical Impression:   The primary encounter diagnosis was Encounter for post surgical wound check. A diagnosis of Pseudoaneurysm was also pertinent to this visit.    Disposition:   Disposition: Discharged  Condition: Stable                        Liliana Martínez NP  03/26/18 3949

## 2018-03-24 NOTE — ED TRIAGE NOTES
Patient reports swelling to her right groin today.  States that she had a valve replacement done Tuesday and they went through her right groin to perform the procedure.  Bruising noted to right groin area but it does not appear to be swollen.  Patient history as follows:   Aortic valve stenosis     Essential (primary) hypertension     Other hyperlipidemia     Chronic atrial fibrillation     Gastroesophageal reflux disease     H/O prosthetic aortic valve replacement     Coronary artery disease of native artery of native heart with stable angina pectoris     Pacemaker 2007     Type 2 diabetes mellitus with stage 3 chronic kidney disease, with long-term current use of insulin    Ascending aortic aneurysm     CAD (coronary artery disease)     Hypothyroidism     Acute on chronic diastolic heart failure     Atherosclerosis of aorta     CKD (chronic kidney disease), stage III     Mild protein-calorie malnutrition     Nodular calcific aortic valve stenosis     Pt identifiers checked and correct  LOC: The patient is awake, alert, aware of environment with an appropriate affect. Oriented x3, speaking appropriately  APPEARANCE: Pt resting comfortably, in no acute distress, pt is clean and well groomed, clothing properly fastened  SKIN: Skin warm, dry and intact, normal skin turgor, moist mucus membranes  RESPIRATORY: Airway is open and patent, respirations are spontaneous, even and unlabored, normal effort and rate  MUSCULOSKELETAL: No obvious deformities.

## 2018-03-24 NOTE — ASSESSMENT & PLAN NOTE
Patient s/p successful Delisa TAVR for failing aortic bioprosthesis with a 26mm S3 via RTF access. No angina or CHF symptoms reported. Subjectively feeling excellent.

## 2018-03-26 NOTE — OP NOTE
DATE OF PROCEDURE: 03/20/2018    ATTENDING SURGEONS: Ovidio Romero M.D and Kurt Baker M.D.    PREOPERATIVE DIAGNOSES:  1. Severe prosthetic aortic stenosis.    POSTOPERATIVE DIAGNOSES:  1. Severe prosthetic aortic stenosis      OPERATION PERFORMED: Transcatheter aortic valve insertion via right transfemoral   approach using a 26 mm [employer] S3 valve    ANESTHESIA: General.    ESTIMATED BLOOD LOSS: 10 mL.    BRIEF HISTORY: This patient is a 82-year-old woman with severe prosthetic aortic stenosis.  The patient has had progressive dyspnea on exertion. This prompted a thoughtful and thorough evaluation, which demonstrated severe aortic stenosis. Given the comorbid conditions, a transcatheter valve insertion was recommended. The patient now presents for transcatheter aortic valve insertion.    PROCEDURE: After obtaining informed and written consent, the patient was   brought to the cath lab and placed on the cath table in supine position. After   induction of adequate anesthesia, a transvenous pacing wire was placed.   Bilateral femoral arterial and femoral venous access was obtained.  A wire was advanced across the aortic valve. It was exchanged for stiff wire, and  a 22 mm [place] balloon was placed. Under rapid ventricular pacing, balloon valvuloplasty was performed. The balloon was then withdrawn, and a 26 mm S3 valve was advanced to the level of the prosthetic aortic annulus. Once the team was satisfied that the valve was in proper position, it was deployed. Excellent positioning was obtained. Post-procedure echo demonstrated no aortic insufficiency. The femoral access sites were controlled using percutaneous techniques. The patient tolerated the procedure well, there were no complications. At the conclusion of the case, sponge and instrument counts were correct.

## 2018-04-24 ENCOUNTER — OFFICE VISIT (OUTPATIENT)
Dept: CARDIOLOGY | Facility: CLINIC | Age: 83
End: 2018-04-24
Payer: MEDICARE

## 2018-04-24 ENCOUNTER — HOSPITAL ENCOUNTER (OUTPATIENT)
Dept: CARDIOLOGY | Facility: CLINIC | Age: 83
Discharge: HOME | End: 2018-04-24
Payer: MEDICARE

## 2018-04-24 VITALS
OXYGEN SATURATION: 98 % | SYSTOLIC BLOOD PRESSURE: 136 MMHG | BODY MASS INDEX: 34.92 KG/M2 | DIASTOLIC BLOOD PRESSURE: 73 MMHG | HEART RATE: 70 BPM | WEIGHT: 190.94 LBS

## 2018-04-24 DIAGNOSIS — I50.32 CHRONIC DIASTOLIC HEART FAILURE: ICD-10-CM

## 2018-04-24 DIAGNOSIS — I71.21 ASCENDING AORTIC ANEURYSM: Chronic | ICD-10-CM

## 2018-04-24 DIAGNOSIS — I35.0 AORTIC VALVE STENOSIS, ETIOLOGY OF CARDIAC VALVE DISEASE UNSPECIFIED: ICD-10-CM

## 2018-04-24 DIAGNOSIS — Z95.0 PACEMAKER: ICD-10-CM

## 2018-04-24 DIAGNOSIS — I70.0 ATHEROSCLEROSIS OF AORTA: ICD-10-CM

## 2018-04-24 DIAGNOSIS — I35.0 NONRHEUMATIC AORTIC VALVE STENOSIS: Primary | ICD-10-CM

## 2018-04-24 DIAGNOSIS — E03.9 HYPOTHYROIDISM, UNSPECIFIED TYPE: ICD-10-CM

## 2018-04-24 DIAGNOSIS — I25.10 CORONARY ARTERY DISEASE, ANGINA PRESENCE UNSPECIFIED, UNSPECIFIED VESSEL OR LESION TYPE, UNSPECIFIED WHETHER NATIVE OR TRANSPLANTED HEART: ICD-10-CM

## 2018-04-24 DIAGNOSIS — I48.20 CHRONIC ATRIAL FIBRILLATION: ICD-10-CM

## 2018-04-24 DIAGNOSIS — I10 ESSENTIAL (PRIMARY) HYPERTENSION: ICD-10-CM

## 2018-04-24 DIAGNOSIS — N18.30 CKD (CHRONIC KIDNEY DISEASE), STAGE III: ICD-10-CM

## 2018-04-24 LAB
ESTIMATED PA SYSTOLIC PRESSURE: 23.25
RETIRED EF AND QEF - SEE NOTES: 55 (ref 55–65)
TRICUSPID VALVE REGURGITATION: NORMAL

## 2018-04-24 PROCEDURE — 93306 TTE W/DOPPLER COMPLETE: CPT | Mod: S$GLB,,, | Performed by: INTERNAL MEDICINE

## 2018-04-24 PROCEDURE — 3078F DIAST BP <80 MM HG: CPT | Mod: CPTII,S$GLB,, | Performed by: INTERNAL MEDICINE

## 2018-04-24 PROCEDURE — 99214 OFFICE O/P EST MOD 30 MIN: CPT | Mod: S$GLB,,, | Performed by: INTERNAL MEDICINE

## 2018-04-24 PROCEDURE — 3075F SYST BP GE 130 - 139MM HG: CPT | Mod: CPTII,S$GLB,, | Performed by: INTERNAL MEDICINE

## 2018-04-24 PROCEDURE — 99999 PR PBB SHADOW E&M-EST. PATIENT-LVL III: CPT | Mod: PBBFAC,,,

## 2018-04-24 NOTE — ASSESSMENT & PLAN NOTE
S/p 26mm S3 Delisa TAVR via RTF access for failing Aortic bioprosthesis   On ASA as also on eliquis

## 2018-04-24 NOTE — ASSESSMENT & PLAN NOTE
CAD s/p CABG  [facility] (2/22/2018): patent SVG-RCA, non-obstructive CAD.  On ASA, statin, and b-blocker. SL Nitro prn.

## 2018-04-24 NOTE — PROGRESS NOTES
"Subjective:    Patient ID:  Nu Lee is a 83 y.o. female who presents for 1 month follow-up of Aortic Stenosis (s/p TAVR )      HPI   Ms. Lee presents for 1 month f/u s/p 26mm S3 Delisa TAVR via RTF access. She is now able to do more housework without SOB and states she feels "much better" since TAVR. She denies chest pain, palpitations, syncope. She sleeps on 1 pillow without orthopnea. She was able to walk from parking garage into clinic today, she was unable to do so prior to her TAVR and had to use a wheelchair. She continues to take Lasix 40mg Bid. She continues to follow with Dr Hooper    NYHA Class I sx    Review of Systems   Constitution: Negative for chills, diaphoresis, fever, weakness, weight gain and weight loss.   HENT: Negative for sore throat.    Eyes: Negative for blurred vision, vision loss in left eye, vision loss in right eye and visual disturbance.   Cardiovascular: Negative for chest pain, claudication, dyspnea on exertion, leg swelling, near-syncope, orthopnea, palpitations, paroxysmal nocturnal dyspnea and syncope.   Respiratory: Negative for cough, hemoptysis, shortness of breath, sputum production and wheezing.    Endocrine: Negative for cold intolerance and heat intolerance.   Hematologic/Lymphatic: Negative for adenopathy. Does not bruise/bleed easily.   Skin: Negative for rash.   Musculoskeletal: Negative for falls, muscle weakness and myalgias.   Gastrointestinal: Negative for abdominal pain, change in bowel habit, constipation, diarrhea, melena and nausea.   Genitourinary: Negative for bladder incontinence.   Neurological: Negative for dizziness, focal weakness, headaches, light-headedness and numbness.   Psychiatric/Behavioral: Negative for altered mental status.        Past Medical History:   Diagnosis Date    Arthritis     Coronary artery disease     Diverticulitis     GERD (gastroesophageal reflux disease)     Hyperlipidemia     Hypertension      Current " Outpatient Prescriptions on File Prior to Visit   Medication Sig Dispense Refill    albuterol (VENTOLIN HFA) 90 mcg/actuation inhaler Inhale into the lungs.      ALPRAZolam (XANAX) 0.25 MG tablet Take 0.25 mg by mouth once daily.       apixaban 5 mg Tab Take 1 tablet (5 mg total) by mouth 2 (two) times daily. 180 tablet 0    aspirin (ECOTRIN) 81 MG EC tablet Take 81 mg by mouth once daily.      benazepril (LOTENSIN) 10 MG tablet Take 10 mg by mouth once daily.       CALCIUM CARB/MAG OXIDE/CU/ZINC (CALCIUM-MAGNESIUM-COPPER-ZINC ORAL) Take 1 tablet by mouth once daily.      cinnamon bark (CINNAMON) 500 mg capsule Take 1,000 mg by mouth 2 (two) times daily.      cyanocobalamin 500 MCG tablet Take 1 tablet by mouth.      digoxin (LANOXIN) 250 mcg tablet Take 250 mcg by mouth once daily.       docusate sodium (COLACE) 50 MG capsule Take 100 mg by mouth 2 (two) times daily.       escitalopram oxalate (LEXAPRO) 20 MG tablet Take 0.5 tablets (10 mg total) by mouth once daily.      furosemide (LASIX) 40 MG tablet Take 40 mg by mouth 2 (two) times daily.       ibuprofen (ADVIL,MOTRIN) 200 MG tablet Take 200 mg by mouth every 6 (six) hours as needed for Pain.      ipratropium-albuterol (COMBIVENT)  mcg/actuation inhaler Inhale into the lungs.      levothyroxine (SYNTHROID) 125 MCG tablet Take 125 mcg by mouth before breakfast.       magnesium oxide (MAG-OX) 250 mg Tab Take 400 mg by mouth.      metoprolol succinate (TOPROL-XL) 100 MG 24 hr tablet Take 1 tablet (100 mg total) by mouth once daily. (Patient taking differently: Take 100 mg by mouth 2 (two) times daily. )      nitroGLYCERIN (NITROSTAT) 0.4 MG SL tablet Place 0.4 mg under the tongue every 5 (five) minutes as needed for Chest pain.      NOVOLOG MIX 70-30 FLEXPEN 100 unit/mL (70-30) InPn pen Inject into the skin 2 (two) times daily. 35 units if glucose <150  40 units if glucose >150      omega-3 fatty acids-fish oil 340-1,000 mg Cap Take 1  "capsule by mouth once daily. 90 capsule 3    ondansetron (ZOFRAN) 8 MG tablet Take 8 mg by mouth every 8 (eight) hours as needed for Nausea.       pantoprazole (PROTONIX) 20 MG tablet Take 20 mg by mouth once daily.       potassium gluconate 2.5 mEq Tab Take 1 tablet by mouth.      ranitidine (ZANTAC) 300 MG capsule Take 300 mg by mouth nightly.      simvastatin (ZOCOR) 20 MG tablet Take 20 mg by mouth every evening.       traZODone (DESYREL) 100 MG tablet Take 50 mg by mouth every evening.       CONTOUR TEST STRIPS Strp USE TO TEST BLOOD SUGAR BID  1    TRUE METRIX GLUCOSE METER Misc       TRUEPLUS LANCETS 28 gauge Misc       TRUEPLUS PEN NEEDLE 31 gauge x 1/4" Ndle        No current facility-administered medications on file prior to visit.      Vitals:    04/24/18 0849 04/24/18 0852   BP: 121/77 136/73   BP Location: Right arm Left arm   Patient Position: Sitting Sitting   BP Method: Large (Automatic) Large (Automatic)   Pulse: 70 70   SpO2: 98%    Weight: 86.6 kg (190 lb 14.7 oz)      Body mass index is 34.92 kg/m².         Objective:    Physical Exam   Constitutional: She is oriented to person, place, and time. She appears well-developed and well-nourished. No distress.   HENT:   Head: Normocephalic and atraumatic.   Mouth/Throat: Oropharynx is clear and moist.   Eyes: Conjunctivae and EOM are normal. Pupils are equal, round, and reactive to light. No scleral icterus.   Neck: Neck supple. No JVD present. No tracheal deviation present.   Cardiovascular: Normal rate and regular rhythm.  Exam reveals no gallop and no friction rub.    Murmur heard.  Pulmonary/Chest: Effort normal and breath sounds normal. No respiratory distress. She has no wheezes. She has no rales. She exhibits no tenderness.   Abdominal: Soft. Bowel sounds are normal. She exhibits no distension. There is no hepatosplenomegaly. There is no tenderness.   Musculoskeletal: She exhibits no edema or tenderness.   Neurological: She is alert " and oriented to person, place, and time.   Skin: Skin is warm and dry. No rash noted. No erythema.   Psychiatric: She has a normal mood and affect. Her behavior is normal.         Assessment:     Hypothyroidism  Stable, on synthroid.     CKD (chronic kidney disease), stage III  Stable     Pacemaker 2007  Dual-chamber [employer] [place] 8 DR-MICHAEL proMRI  Placed in 2007, Generator change in Aug 2017.    Essential (primary) hypertension  Controlled on current regimen.     Chronic diastolic heart failure  Clinically well compensated  On Lasix 40mg PO BID  Weighs daily     Chronic atrial fibrillation  Chronic, rate-controlled on digoxin.   On Eliquis.     CAD (coronary artery disease)  CAD s/p CABG  [facility] (2/22/2018): patent SVG-RCA, non-obstructive CAD.  On ASA, statin, and b-blocker. SL Nitro prn.     Atherosclerosis of aorta  Seen on TAVR CTA.   Stable.  On ASA and statin.     Ascending aortic aneurysm  Asc Aortic Aneurysm (4.8cm per patient) s/p repair (2009)    Aortic valve stenosis  S/p 26mm S3 Delisa TAVR via RTF access for failing Aortic bioprosthesis   On ASA as also on eliquis        Plan:       Echo to follow will call patient with results   Continue ASA alone as she is also on eliquis for A Fib  SBEP for life   F/u in 1 year (March 2019) with echo  F/u with Dr. Hooper as scheduled

## 2018-09-29 PROBLEM — R47.9 SPEECH DISTURBANCE: Status: ACTIVE | Noted: 2018-09-29

## 2019-01-09 DIAGNOSIS — Z95.2 S/P TAVR (TRANSCATHETER AORTIC VALVE REPLACEMENT): Primary | ICD-10-CM

## 2019-03-19 ENCOUNTER — TELEPHONE (OUTPATIENT)
Dept: CARDIOLOGY | Facility: CLINIC | Age: 84
End: 2019-03-19

## 2019-03-19 NOTE — TELEPHONE ENCOUNTER
Called and spoke with her daughter who states her mother had a stroke and then dropped something on her foot and is in her wheelchair.  Will reschedule her in one month.

## 2019-03-19 NOTE — TELEPHONE ENCOUNTER
Called pt about her appt for today daughter stated mom is unavailable to make that 2hr drive. Pt was brought to the ED on 3/18

## 2019-03-27 ENCOUNTER — OUTSIDE PLACE OF SERVICE (OUTPATIENT)
Dept: ORTHOPEDICS | Facility: CLINIC | Age: 84
End: 2019-03-27
Payer: MEDICARE

## 2019-03-27 PROCEDURE — 99223 PR INITIAL HOSPITAL CARE,LEVL III: ICD-10-PCS | Mod: S$GLB,,, | Performed by: ORTHOPAEDIC SURGERY

## 2019-03-27 PROCEDURE — 99223 1ST HOSP IP/OBS HIGH 75: CPT | Mod: S$GLB,,, | Performed by: ORTHOPAEDIC SURGERY

## 2019-07-02 ENCOUNTER — HOSPITAL ENCOUNTER (OUTPATIENT)
Dept: TELEMEDICINE | Facility: HOSPITAL | Age: 84
Discharge: HOME | End: 2019-07-02
Payer: MEDICARE

## 2019-07-02 PROCEDURE — G0425 INPT/ED TELECONSULT30: HCPCS | Mod: GT,,, | Performed by: PSYCHIATRY & NEUROLOGY

## 2019-07-02 PROCEDURE — G0425 PR INPT TELEHEALTH CONSULT 30M: ICD-10-PCS | Mod: GT,,, | Performed by: PSYCHIATRY & NEUROLOGY

## 2019-07-02 NOTE — CONSULTS
[facility]  Vascular Neurology  Comprehensive Stroke Center  Tele-Consultation Note      Consults    Consulting Provider: EDDY SANDERS  Current Providers  No providers found    Patient Location: [facility] - TELEMEDICINE ED [facility] Emergency Department  Spoke hospital nurse at bedside with patient assisting consultant.     Patient information was obtained from patient, relative(s) and ED nurse.         Assessment/Plan:   83 y/o with HTN, DM, CAD, A fib on apixaban, s/p aortic valve replacement, CKD-3, brought in due to acute onset R arm numbness and trouble speaking.  R arm numbness resolved but still with mild expressive aphasia.  Suspect small cortical L MCA neurovascular insult but she is on apixaban and thus no a candidate for treatment with iv alteplase. Although LVO seems less likely, will still recommend CTA or MRA brain if feasible in order to better address her syndrome.  Admit to the hospital and complete stroke work up. Transfer to the main campus if evidence of LVO.  Neurology and speech therapy consults.      STROKE DOCUMENTATION     Acute Stroke Times:   Acute Stroke Times   Last Known Normal Date: 07/02/19  Last Known Normal Time: 1420  Symptom Onset Date: 07/02/19  Symptom Onset Time: 1420  Stroke Team Called Date: 07/02/19  Stroke Team Called Time: 1558  Stroke Team Arrival Date: 07/02/19  Stroke Team Arrival Time: 1604  CT Interpretation Time: 1604  Decision to Treat Time for Alteplase: (No IV alteplase)  Decision to Treat Time for IR: (No IR candidate)    NIH Scale:  Interval: baseline  1a. Level of Consciousness: 0-->Alert, keenly responsive  1b. LOC Questions: 0-->Answers both questions correctly  1c. LOC Commands: 0-->Performs both tasks correctly  2. Best Gaze: 0-->Normal  3. Visual: 0-->No visual loss  4. Facial Palsy: 0-->Normal symmetrical movements  5a. Motor Arm, Left: 0-->No drift, limb holds 90 (or 45) degrees for  full 10 secs  5b. Motor Arm, Right: 0-->No drift, limb holds 90 (or 45) degrees for full 10 secs  6a. Motor Leg, Left: 0-->No drift, leg holds 30 degree position for full 5 secs  6b. Motor Leg, Right: 0-->No drift, leg holds 30 degree position for full 5 secs  7. Limb Ataxia: 0-->Absent  8. Sensory: 0-->Normal, no sensory loss  9. Best Language: 1-->Mild-to-moderate aphasia, some obvious loss of fluency or facility of comprehension, without significant limitation on ideas expressed or form of expression. Reduction of speech and/or comprehension, however, makes conversation. . . (see row details)  10. Dysarthria: 0-->Normal  11. Extinction and Inattention (formerly Neglect): 0-->No abnormality  Total (NIH Stroke Scale): 1     Modified [place]ee Score: 0  J Carlos Coma Scale:15   ABCD2 Score:    [id number] Score:8  HAS -BLED Score:4  ICH Score:   Hunt & Briggs Classification:       Diagnoses: acute expressive aphasia, mild  No new Assessment & Plan notes have been filed under this hospital service since the last note was generated.  Service: Vascular Neurology      There were no vitals taken for this visit.  Alteplase Eligible?: No  Alteplase Recommendation: Alteplase not recommended due to on eliquis  Possible Interventional Revascularization Candidate? pending CTA or MRA brain results    Disposition Recommendation: admit to inpatient    Subjective:     History of Present Illness: 83 y/o with HTN, DM, CAD, A fib on apixaban, s/p aortic valve replacement, CKD-3, brought in due to acute onset R arm numbness and trouble speaking. Had similar symptoms last year.  Symptoms developed at home around 220 pm, initially numbness R arm (now resolved) and language impairment that is still present. No other associated neurological symptoms.        No notes on file      Woke up with symptoms?: no    Recent bleeding noted: no  Does the patient take any Blood Thinners? yes  Medications: Anticoagulants:  apixaban/Eliquis      Past  Medical History: hypertension, diabetes, CHF, tia, Afib, kidney problems/dialysis and Heart Problems    Past Surgical History: no major surgeries within the last 2 weeks    Family History: no relevant history    Social History: no smoking, no drinking, no drugs    Allergies: No Known Allergies No known drug allergies    Review of Systems   Constitutional: Negative for diaphoresis and fever.   HENT: Negative for hearing loss, tinnitus and trouble swallowing.    Eyes: Positive for visual disturbance.   Respiratory: Negative for shortness of breath.    Cardiovascular: Negative for chest pain and palpitations.   Gastrointestinal: Negative for vomiting.   Endocrine: Negative for cold intolerance.   Genitourinary: Negative for hematuria.   Musculoskeletal: Negative for neck pain and neck stiffness.   Allergic/Immunologic: Negative for immunocompromised state.   Neurological: Positive for speech difficulty and numbness. Negative for dizziness, facial asymmetry, weakness and headaches.   Hematological: Does not bruise/bleed easily.   Psychiatric/Behavioral: Negative for agitation, behavioral problems and confusion.     Objective:   Vitals: There were no vitals taken for this visit. BP: 128/76, Respiratory Rate: 17 and Heart Rate: 82    CT READ: Yes  No hemmorhage. No mass effect. No early infarct signs.     Physical Exam   Constitutional: She is oriented to person, place, and time. She appears well-developed and well-nourished.   HENT:   Head: Normocephalic and atraumatic.   Eyes: Pupils are equal, round, and reactive to light. EOM are normal.   Neck: Normal range of motion. Neck supple.   Cardiovascular: Normal rate.   Pulmonary/Chest: No respiratory distress.   Abdominal: She exhibits no mass.   Obese     Genitourinary:   Genitourinary Comments: No performed   Neurological: She is alert and oriented to person, place, and time. No cranial nerve deficit or sensory deficit. Coordination normal.   Skin: No rash noted. She is  not diaphoretic. No erythema.   Psychiatric: She has a normal mood and affect. Her behavior is normal.   Nursing note and vitals reviewed.            Recommended the emergency room physician to have a brief discussion with the patient and/or family if available regarding the risks and benefits of treatment, and to briefly document the occurrence of that discussion in his clinical encounter note.     The attending portion of this evaluation, treatment, and documentation was performed per Tony Jhaveri MD via audiovisual.    Billing code:  (non-intervention mild to moderate stroke, TIA, some mimics)    · This patient has a critical neurological condition/illness, with some potential for high morbidity and mortality.  · There is a moderate probability for acute neurological change leading to clinical and possibly life-threatening deterioration requiring highest level of physician preparedness for urgent intervention.  · Care was coordinated with other physicians involved in the patient's care.  · Radiologic studies and laboratory data were reviewed and interpreted, and plan of care was re-assessed based on the results.  · Diagnosis, treatment options and prognosis may have been discussed with the patient and/or family members or caregiver.      In your opinion, this was a: Tier 1 Van Positive    Consult End Time: 4:20 pm    Tony Jhaveri MD  Comprehensive Stroke Center  Vascular Neurology   [facility]

## 2019-07-02 NOTE — SUBJECTIVE & OBJECTIVE
Woke up with symptoms?: no    Recent bleeding noted: no  Does the patient take any Blood Thinners? yes  Medications: Anticoagulants:  apixaban/Eliquis      Past Medical History: hypertension, diabetes, CHF, tia, Afib, kidney problems/dialysis and Heart Problems    Past Surgical History: no major surgeries within the last 2 weeks    Family History: no relevant history    Social History: no smoking, no drinking, no drugs    Allergies: No Known Allergies No known drug allergies    Review of Systems   Constitutional: Negative for diaphoresis and fever.   HENT: Negative for hearing loss, tinnitus and trouble swallowing.    Eyes: Positive for visual disturbance.   Respiratory: Negative for shortness of breath.    Cardiovascular: Negative for chest pain and palpitations.   Gastrointestinal: Negative for vomiting.   Endocrine: Negative for cold intolerance.   Genitourinary: Negative for hematuria.   Musculoskeletal: Negative for neck pain and neck stiffness.   Allergic/Immunologic: Negative for immunocompromised state.   Neurological: Positive for speech difficulty and numbness. Negative for dizziness, facial asymmetry, weakness and headaches.   Hematological: Does not bruise/bleed easily.   Psychiatric/Behavioral: Negative for agitation, behavioral problems and confusion.     Objective:   Vitals: There were no vitals taken for this visit. BP: 128/76, Respiratory Rate: 17 and Heart Rate: 82    CT READ: Yes  No hemmorhage. No mass effect. No early infarct signs.     Physical Exam   Constitutional: She is oriented to person, place, and time. She appears well-developed and well-nourished.   HENT:   Head: Normocephalic and atraumatic.   Eyes: Pupils are equal, round, and reactive to light. EOM are normal.   Neck: Normal range of motion. Neck supple.   Cardiovascular: Normal rate.   Pulmonary/Chest: No respiratory distress.   Abdominal: She exhibits no mass.   Obese     Genitourinary:   Genitourinary Comments: No performed    Neurological: She is alert and oriented to person, place, and time. No cranial nerve deficit or sensory deficit. Coordination normal.   Skin: No rash noted. She is not diaphoretic. No erythema.   Psychiatric: She has a normal mood and affect. Her behavior is normal.   Nursing note and vitals reviewed.

## 2020-02-18 DIAGNOSIS — I11.9 MALIGNANT HYPERTENSIVE HEART DISEASE WITHOUT HEART FAILURE: Primary | ICD-10-CM

## 2020-04-28 DIAGNOSIS — E04.1 NONTOXIC SINGLE THYROID NODULE: Primary | ICD-10-CM

## 2020-05-05 ENCOUNTER — HOSPITAL ENCOUNTER (OUTPATIENT)
Dept: RADIOLOGY | Facility: HOSPITAL | Age: 85
Discharge: HOME | End: 2020-05-05
Attending: INTERNAL MEDICINE
Payer: MEDICARE

## 2020-05-05 DIAGNOSIS — I11.9 MALIGNANT HYPERTENSIVE HEART DISEASE WITHOUT HEART FAILURE: ICD-10-CM

## 2020-05-05 LAB
CREAT SERPL-MCNC: 0.9 MG/DL (ref 0.5–1.4)
SAMPLE: NORMAL

## 2020-05-05 PROCEDURE — 71275 CT ANGIOGRAPHY CHEST: CPT | Mod: TC,PO

## 2020-05-05 PROCEDURE — 25500020 PHARM REV CODE 255: Mod: PO | Performed by: INTERNAL MEDICINE

## 2020-05-05 RX ADMIN — IOHEXOL 100 ML: 350 INJECTION, SOLUTION INTRAVENOUS at 02:05

## 2020-05-11 ENCOUNTER — HOSPITAL ENCOUNTER (OUTPATIENT)
Dept: RADIOLOGY | Facility: HOSPITAL | Age: 85
Discharge: HOME | End: 2020-05-11
Attending: FAMILY MEDICINE
Payer: MEDICARE

## 2020-05-11 DIAGNOSIS — E04.1 NONTOXIC SINGLE THYROID NODULE: ICD-10-CM

## 2020-05-11 PROCEDURE — 76536 US EXAM OF HEAD AND NECK: CPT | Mod: TC,PO

## 2020-09-04 RX ORDER — DIGOXIN 125 MCG
125 TABLET ORAL DAILY
Qty: 90 TABLET | Refills: 3 | Status: SHIPPED | OUTPATIENT
Start: 2020-09-04 | End: 2020-09-25 | Stop reason: SDUPTHER

## 2020-09-04 RX ORDER — DIGOXIN 125 MCG
125 TABLET ORAL DAILY
COMMUNITY
End: 2020-09-04

## 2020-09-04 NOTE — TELEPHONE ENCOUNTER
----- Message from Veronica Conrad LPN sent at 9/4/2020  1:14 PM CDT -----  Patient requesting refill on Digoxin 125mg  once daily to be sent to [facility] pharmacy     Call back number [phone]

## 2020-09-04 NOTE — TELEPHONE ENCOUNTER
----- Message from Karen Betancourt sent at 9/4/2020  3:48 PM CDT -----  Regarding: Refill  [facility]...completely out  [phone]

## 2020-09-14 RX ORDER — DILTIAZEM HYDROCHLORIDE 120 MG/1
120 CAPSULE, COATED, EXTENDED RELEASE ORAL DAILY
COMMUNITY
End: 2020-09-14 | Stop reason: SDUPTHER

## 2020-09-15 RX ORDER — DILTIAZEM HYDROCHLORIDE 120 MG/1
120 CAPSULE, COATED, EXTENDED RELEASE ORAL DAILY
Qty: 90 CAPSULE | Refills: 1 | Status: SHIPPED | OUTPATIENT
Start: 2020-09-15 | End: 2020-09-17 | Stop reason: SDUPTHER

## 2020-09-18 RX ORDER — DILTIAZEM HYDROCHLORIDE 120 MG/1
120 CAPSULE, COATED, EXTENDED RELEASE ORAL DAILY
Qty: 90 CAPSULE | Refills: 1 | Status: SHIPPED | OUTPATIENT
Start: 2020-09-18 | End: 2020-09-25 | Stop reason: SDUPTHER

## 2020-09-25 NOTE — TELEPHONE ENCOUNTER
----- Message from Lisa Amaya sent at 9/25/2020  1:48 PM CDT -----  Regarding: Medicine  Patient would like all her medicine called for mail out service please call patient if you have any questions at [phone]

## 2020-09-29 RX ORDER — DIGOXIN 125 MCG
125 TABLET ORAL DAILY
Qty: 90 TABLET | Refills: 3 | Status: SHIPPED | OUTPATIENT
Start: 2020-09-29 | End: 2020-11-05 | Stop reason: SDUPTHER

## 2020-09-29 RX ORDER — DILTIAZEM HYDROCHLORIDE 120 MG/1
120 CAPSULE, COATED, EXTENDED RELEASE ORAL DAILY
Qty: 90 CAPSULE | Refills: 1 | Status: SHIPPED | OUTPATIENT
Start: 2020-09-29

## 2020-10-09 NOTE — TELEPHONE ENCOUNTER
----- Message from Karen Betancourt sent at 10/9/2020 11:48 AM CDT -----  Regarding: refill  [phone]    Called last week has left messages hasnt heard back she is out of eliquis     Mail order   But because shes is completely out city [facility]

## 2020-10-09 NOTE — TELEPHONE ENCOUNTER
Malachi vm to patient that rx was sent to [facility] but we can send to [facility]. Patient needs to check with [facility].

## 2020-10-19 ENCOUNTER — OFFICE VISIT (OUTPATIENT)
Dept: CARDIOLOGY | Facility: CLINIC | Age: 85
End: 2020-10-19
Payer: MEDICARE

## 2020-10-19 VITALS
WEIGHT: 201 LBS | OXYGEN SATURATION: 97 % | DIASTOLIC BLOOD PRESSURE: 76 MMHG | SYSTOLIC BLOOD PRESSURE: 126 MMHG | RESPIRATION RATE: 16 BRPM | BODY MASS INDEX: 36.99 KG/M2 | HEIGHT: 62 IN | HEART RATE: 93 BPM

## 2020-10-19 DIAGNOSIS — I10 ESSENTIAL (PRIMARY) HYPERTENSION: ICD-10-CM

## 2020-10-19 DIAGNOSIS — I25.10 CORONARY ARTERY DISEASE INVOLVING NATIVE HEART WITHOUT ANGINA PECTORIS, UNSPECIFIED VESSEL OR LESION TYPE: ICD-10-CM

## 2020-10-19 DIAGNOSIS — I35.0 NONRHEUMATIC AORTIC VALVE STENOSIS: ICD-10-CM

## 2020-10-19 DIAGNOSIS — I50.32 CHRONIC DIASTOLIC HEART FAILURE: ICD-10-CM

## 2020-10-19 DIAGNOSIS — Z95.0 CARDIAC PACEMAKER IN SITU: ICD-10-CM

## 2020-10-19 DIAGNOSIS — I71.21 ASCENDING AORTIC ANEURYSM: Primary | Chronic | ICD-10-CM

## 2020-10-19 DIAGNOSIS — I48.11 LONGSTANDING PERSISTENT ATRIAL FIBRILLATION: ICD-10-CM

## 2020-10-19 PROBLEM — I25.118 CORONARY ARTERY DISEASE OF NATIVE ARTERY OF NATIVE HEART WITH STABLE ANGINA PECTORIS: Status: RESOLVED | Noted: 2018-02-21 | Resolved: 2020-10-19

## 2020-10-19 PROCEDURE — 1159F MED LIST DOCD IN RCRD: CPT | Mod: S$GLB,,, | Performed by: INTERNAL MEDICINE

## 2020-10-19 PROCEDURE — 1159F PR MEDICATION LIST DOCUMENTED IN MEDICAL RECORD: ICD-10-PCS | Mod: S$GLB,,, | Performed by: INTERNAL MEDICINE

## 2020-10-19 PROCEDURE — 3078F DIAST BP <80 MM HG: CPT | Mod: CPTII,S$GLB,, | Performed by: INTERNAL MEDICINE

## 2020-10-19 PROCEDURE — 3078F PR MOST RECENT DIASTOLIC BLOOD PRESSURE < 80 MM HG: ICD-10-PCS | Mod: CPTII,S$GLB,, | Performed by: INTERNAL MEDICINE

## 2020-10-19 PROCEDURE — 1126F AMNT PAIN NOTED NONE PRSNT: CPT | Mod: S$GLB,,, | Performed by: INTERNAL MEDICINE

## 2020-10-19 PROCEDURE — 3074F SYST BP LT 130 MM HG: CPT | Mod: CPTII,S$GLB,, | Performed by: INTERNAL MEDICINE

## 2020-10-19 PROCEDURE — 93000 EKG 12-LEAD: ICD-10-PCS | Mod: S$GLB,,, | Performed by: INTERNAL MEDICINE

## 2020-10-19 PROCEDURE — 99214 OFFICE O/P EST MOD 30 MIN: CPT | Mod: 25,S$GLB,, | Performed by: INTERNAL MEDICINE

## 2020-10-19 PROCEDURE — 99214 PR OFFICE/OUTPT VISIT, EST, LEVL IV, 30-39 MIN: ICD-10-PCS | Mod: 25,S$GLB,, | Performed by: INTERNAL MEDICINE

## 2020-10-19 PROCEDURE — 1126F PR PAIN SEVERITY QUANTIFIED, NO PAIN PRESENT: ICD-10-PCS | Mod: S$GLB,,, | Performed by: INTERNAL MEDICINE

## 2020-10-19 PROCEDURE — 3074F PR MOST RECENT SYSTOLIC BLOOD PRESSURE < 130 MM HG: ICD-10-PCS | Mod: CPTII,S$GLB,, | Performed by: INTERNAL MEDICINE

## 2020-10-19 PROCEDURE — 93000 ELECTROCARDIOGRAM COMPLETE: CPT | Mod: S$GLB,,, | Performed by: INTERNAL MEDICINE

## 2020-10-19 NOTE — PROGRESS NOTES
Subjective:    Patient ID:  Nu Lee is a 85 y.o. female     HPI  Patient presents to the clinic today for follow-up of coronary artery disease, ascending aortic aneurysm, atrial fibrillation, congestive heart failure, dyslipidemia.  Since last clinic visit she denies any hospitalizations or emergency room visits.  Overall she is doing more less about the same as her previous visit.  She denies any chest pain.  Her shortness of breath with extreme exertion intermittently is about the same as before.  She reports intermittent lower extremity edema.  She denies any palpitations, lightheadedness, dizziness or any syncopal episodes.  She denies any new symptoms of CVA or TIA since last clinic visit.  She denies any bleeding issues.  She reports to be compliant with her medications.  The medication list on epic is not accurate.  She reportedly had labs done by Dr. Christian last week    Current Outpatient Medications   Medication Instructions    albuterol (VENTOLIN HFA) 90 mcg/actuation inhaler Inhalation    ALPRAZolam (XANAX) 0.25 mg, Oral, Daily    apixaban (ELIQUIS) 5 mg, Oral, 2 times daily    aspirin (ECOTRIN) 81 mg, Oral, Daily    benazepriL (LOTENSIN) 10 mg, Oral, Daily    CALCIUM CARB/MAG OXIDE/CU/ZINC (CALCIUM-MAGNESIUM-COPPER-ZINC ORAL) 1 tablet, Oral, Daily    cinnamon bark (CINNAMON) 1,000 mg, Oral, 2 times daily    CONTOUR TEST STRIPS Strp USE TO TEST BLOOD SUGAR BID    cyanocobalamin 500 MCG tablet 1 tablet, Oral    digoxin (LANOXIN) 125 mcg, Oral, Daily    diltiaZEM (CARDIZEM CD) 120 mg, Oral, Daily    docusate sodium (COLACE) 100 mg, Oral, 2 times daily    escitalopram oxalate (LEXAPRO) 10 mg, Oral, Daily    furosemide (LASIX) 40 mg, Oral, 2 times daily    ibuprofen (ADVIL,MOTRIN) 200 mg, Oral, Every 6 hours PRN    ipratropium-albuterol (COMBIVENT)  mcg/actuation inhaler Inhalation    levothyroxine (SYNTHROID) 125 mcg, Oral, Before breakfast    magnesium oxide  "(MAG-OX) 400 mg, Oral    metoprolol succinate (TOPROL-XL) 100 mg, Oral, Daily    nitroGLYCERIN (NITROSTAT) 0.4 mg, Sublingual, Every 5 min PRN    NOVOLOG MIX 70-30 FLEXPEN 100 unit/mL (70-30) InPn pen Subcutaneous, 2 times daily, 35 units if glucose <71630 units if glucose >150    omega-3 fatty acids-fish oil 340-1,000 mg Cap 1 capsule, Oral, Daily    ondansetron (ZOFRAN) 8 mg, Oral, Every 8 hours PRN    pantoprazole (PROTONIX) 20 mg, Oral, Daily    potassium gluconate 2.5 mEq Tab 1 tablet, Oral    ranitidine (ZANTAC) 300 mg, Oral, Nightly    simvastatin (ZOCOR) 20 mg, Oral, Nightly    traZODone (DESYREL) 50 mg, Oral, Nightly    TRUE METRIX GLUCOSE METER Misc No dose, route, or frequency recorded.    TRUEPLUS LANCETS 28 gauge Misc No dose, route, or frequency recorded.    TRUEPLUS PEN NEEDLE 31 gauge x 1/4" Ndle No dose, route, or frequency recorded.        Review of Systems   Constitution: Positive for malaise/fatigue. Negative for decreased appetite, fever, weight gain and weight loss.   HENT: Negative for ear pain and sore throat.    Eyes: Negative for double vision and pain.   Cardiovascular: Positive for dyspnea on exertion. Negative for chest pain, claudication, leg swelling and near-syncope.   Respiratory: Positive for shortness of breath. Negative for cough and hemoptysis.    Endocrine: Negative for heat intolerance and polydipsia.   Hematologic/Lymphatic: Negative for bleeding problem.   Skin: Negative for rash.   Musculoskeletal: Negative for stiffness.   Gastrointestinal: Negative for abdominal pain, jaundice and vomiting.   Genitourinary: Negative for dysuria.   Neurological: Negative for seizures and tremors.   Psychiatric/Behavioral: Negative for altered mental status, hallucinations and suicidal ideas.        Objective:     Vitals:    10/19/20 0956   BP: 126/76   BP Location: Left arm   Patient Position: Sitting   BP Method: Large (Manual)   Pulse: 93   Resp: 16   SpO2: 97%   Weight: 91.2 " "kg (201 lb)   Height: 5' 2" (1.575 m)       Physical Exam   Constitutional: She is oriented to person, place, and time. She appears well-developed and well-nourished.   HENT:   Head: Normocephalic and atraumatic.   Eyes: Pupils are equal, round, and reactive to light. Conjunctivae are normal.   Neck: Neck supple. No JVD present.   Cardiovascular: Normal rate, S1 normal and S2 normal. An irregularly irregular rhythm present. Exam reveals no gallop and no friction rub.   Murmur heard.  Pulses:       Carotid pulses are 2+ on the right side and 2+ on the left side.       Posterior tibial pulses are 2+ on the right side and 2+ on the left side.   Pulmonary/Chest: No stridor. No respiratory distress. She has no wheezes. She has no rales.   Abdominal: Soft. She exhibits no distension. There is no abdominal tenderness.   Musculoskeletal:         General: No edema.   Neurological: She is alert and oriented to person, place, and time.   Skin: Skin is warm and dry. No burn and no rash noted. No cyanosis. Nails show no clubbing.   Psychiatric: Her behavior is normal. She expresses no suicidal ideation.      No results found for this or any previous visit.   Results for orders placed during the hospital encounter of 03/20/18   Cath lab procedure    Narrative    Date of Procedure:  03/20/2018    A. Indication/Pre-Operative Diagnosis     The patient is a 82 year old female that was referred for catheterization by Dr. Cintia Hooepr for Failing 25mm Magna aortic valve with AS for Delisa TAVR..     B. Summary/Post-Operative Diagnosis       Successful Delisa TAVR for failing aortic bioprosthesis with a 26mm S3 via RTF access.    No acute procedural complications.    No EP consultation since she has PPM in situ.    C. HPI     I have reviewed the history and physical completed on 03/20/2018. The patient has been examined and I concur with the findings from 03/20/2018.     Patient history was obtained from the patient.     Counseling: " The patient was counseled regarding the potential risks and benefits of this procedure, as well as possible alternative approaches to the problem and gave informed consent.    The ASA Score was Class III.     Height: 62 in.      Weight: 185 lbs.      BMI: 33.80 kg/m2    OUTPATIENT MEDICATIONS: Medications were reviewed.  ALLERGIES: Allergies were reviewed.    Laboratory data revealed:     03/19/2018 APTT  23.8, CREAT  1.1, GLU  68, BUN  17, HCT  36.2, HGB  12.3, INR  1.0, K  3.7, NA  140, PLT  211, PTI  10.5, WBCIR  9.90   03/20/2018 GRPE  O NEG            ACS Enzymes:     02/21/2018 TROP  0.035              D. Angiographic Results     Diagnostic:          Patient has a left dominant coronary artery.       Intervention          Aortic valve:             The Aortic valve was successfully replaced. The following items were used: Blln [place] True 22mm and Fariba S3 Comm Kit Tf 26mm.    E. Details of Procedure     PROCEDURES PERFORMED: Aortic Valve Replace T/F, Aortic Valvuloplasty, Placement of Closure Device and Temporary Transvenous Pacemaker    ANESTHESIA: Sedation was administered by the Anesthesia Service.     CO-SURGEON: Ovidio Romero MD and Michael Baker MD  FIRST ASSISTING PHYSICIAN: Harvey Gray MD  FELLOW: Chelle White MD and Jorge Torres MD    COMPLICATIONS: There were no complications.    The patient was brought to the catheterization laboratory after premedication with oral Benadryl 50 mg. Bilateral groin prepped and draped. The Custom Kit was flushed and connected to contrast. All vessel access obtained with VSI micropuncture   introducer. After local anesthesia, a [id number] Sheath was inserted into the left Femoral vein.     Right  VENTRICLE    A 5FR Heart Pacer Cath was inserted under fluoroscopic guidance into the right ventricle. A 8FR 11cm Sheath was inserted into the right femoral artery. The 8FR 11cm Sheath was removed. A 6FR Perclose Proglide was inserted into the right femoral artery. A    6FR Perclose Proglide was inserted into the right femoral artery. A Wire Supracore .035 X 190 was inserted into the right femoral artery. 14Fr Copeland expandable sheath inserted into the RFA.     AORTA    The Wire Supracore .035 X 190 was repositioned into the Aorta.     AORTIC ROOT    A 6FR AL-II Catheter was inserted into the Aortic Root. The Wire Supracore .035 X 190 was removed. The 6FR AL-II Catheter was removed. A 6FR AL-IIi Catheter was inserted into the Aortic Root. The 6FR AL-IIi Catheter was removed. A 6FR AL 3 Vista Guide   Cath was inserted into the Aortic Root. The 6FR AL 3 Donie Guide Cath was removed. A 6FR AR 2 Vista Guide Cath was inserted into the Aortic Root. The 6FR AR 2 Donie Guide Cath was removed. A 6FR AL 1 Vista Guide Cath was inserted into the Aortic Root.     Left  VENTRICLE    A [id number] Stiff St Glidewire was inserted into the left ventricle. The 6FR AL 1 Donie Guide Cath was removed. A 6FR Pigtail Angled Catheter was inserted into the left ventricle. The 35X260 Stiff St Glidewire was removed. A .035X300 X-Stiff Guidewire was   inserted into the left ventricle. The 6FR Pigtail Angled Catheter was removed.     AORTIC VALVE    A Blln [place] True 22mm was inserted into the Aortic valve. Pacing continued for 4.0 secs. 22mm True balloon inflated in the aortic valve. The Blln [place] True 22mm was removed. A Fariba S3 Comm Kit Tf 26mm was inserted into the Aortic valve.   Pacing continued for 7.0 secs. A Fariba S3 Comm Kit Tf 26mm was deployed into the Aortic valve. The .035X300 X-Stiff Guidewire was removed. 14Fr Copeland sheath removed from the RFA. A 6FR Perclose Proglide was deployed into the right femoral artery. A   6FR Perclose Proglide was deployed into the right femoral artery.     The 5FR Heart Pacer Cath was removed. The [id number] Sheath was removed and manual pressure was applied. Total Visipaque injected was 7.0 ml. Total Visipaque used was 100.0 ml.       Fluoroscopy Time 16  minutes   Radiation Dose 1065 mGy   Contrast Injected 7 ml Visipaque   Contrast Used  100 ml Visipaque            Procedure log documented by Anamaria Templeton RT and verified by Ovidio Romero MD    ESTIMATED BLOOD LOSS is < 50 cc.    SPECIMEN: No specimen.     F. Recommendations     1. ASA and Xarelto post TAVR.  2. Post TAVR care.    I certify that I was present for catheter insertion, catheter manipulation, angiography, angiographic interpretation, and all interventional procedures performed on this patient.     This document has been electronically    SIGNED BY: Ovidio Romero MD On: 03/20/2018 11:34    This document was originally electronically signed on: 03/20/2018 11:33          Assessment:       Problem List Items Addressed This Visit        Cardiac/Vascular    Ascending aortic aneurysm - Primary (Chronic)    Overview     CT scan of the chest in February 2017 showed an aneurysm measuring 4.8 cm.  Per patient it has been stable over the past 3 years.  Most recent [facility] at [facility] on 11/13/2018 showed a maximal diameter of 4.6 cm.  CTA on 5/3/2020 showed an ascending aortic aneurysmal diameter of 4.7 cm.          Aortic valve stenosis    Overview     Bioprosthetic aortic valve stenosis. S/p TAVR in 3/2018.          Essential (primary) hypertension    Cardiac pacemaker in situ    Overview     Biotronik device.          CAD (coronary artery disease)    Overview     S/p CABG X 1.          Chronic diastolic heart failure    Current Assessment & Plan     Euvolemic on exam          Longstanding persistent atrial fibrillation            Plan:       1.  She was advised to bring all her medications to the clinic so that we can update the list next Monday.  2.  Continue current medical regimen for now.  3.  Return to the clinic in about 3 months or sooner if needed.  4.  Get labs from Dr. Christian.

## 2020-11-05 RX ORDER — DIGOXIN 125 MCG
125 TABLET ORAL DAILY
Qty: 90 TABLET | Refills: 3 | Status: SHIPPED | OUTPATIENT
Start: 2020-11-05

## 2020-11-05 RX ORDER — SOTALOL HYDROCHLORIDE 80 MG/1
40 TABLET ORAL 2 TIMES DAILY
Qty: 45 TABLET | Refills: 3 | Status: ON HOLD | OUTPATIENT
Start: 2020-11-05 | End: 2023-11-20 | Stop reason: HOSPADM

## 2020-11-05 RX ORDER — SOTALOL HYDROCHLORIDE 80 MG/1
TABLET ORAL
COMMUNITY
Start: 2020-08-31 | End: 2020-11-05 | Stop reason: SDUPTHER

## 2020-11-10 DIAGNOSIS — I48.11 LONGSTANDING PERSISTENT ATRIAL FIBRILLATION: Primary | ICD-10-CM

## 2020-12-04 ENCOUNTER — TELEPHONE (OUTPATIENT)
Dept: CARDIOLOGY | Facility: CLINIC | Age: 85
End: 2020-12-04

## 2020-12-04 NOTE — TELEPHONE ENCOUNTER
----- Message from Karen Betancourt sent at 12/4/2020  4:10 PM CST -----  Regarding: appt  Wants appt sooner than Feb       [phone]

## 2020-12-17 ENCOUNTER — HOSPITAL ENCOUNTER (OUTPATIENT)
Dept: RADIOLOGY | Facility: CLINIC | Age: 85
Discharge: HOME | End: 2020-12-17
Attending: PODIATRIST
Payer: MEDICARE

## 2020-12-17 ENCOUNTER — OFFICE VISIT (OUTPATIENT)
Dept: PODIATRY | Facility: CLINIC | Age: 85
End: 2020-12-17
Payer: MEDICARE

## 2020-12-17 VITALS — HEIGHT: 62 IN | BODY MASS INDEX: 37.17 KG/M2 | RESPIRATION RATE: 18 BRPM | WEIGHT: 202 LBS | HEART RATE: 98 BPM

## 2020-12-17 DIAGNOSIS — M79.671 BILATERAL FOOT PAIN: Primary | ICD-10-CM

## 2020-12-17 DIAGNOSIS — M92.61 HAGLUND'S DEFORMITY OF RIGHT HEEL: ICD-10-CM

## 2020-12-17 DIAGNOSIS — M76.821 POSTERIOR TIBIAL TENDON DYSFUNCTION (PTTD) OF BOTH LOWER EXTREMITIES: ICD-10-CM

## 2020-12-17 DIAGNOSIS — M79.672 BILATERAL FOOT PAIN: Primary | ICD-10-CM

## 2020-12-17 DIAGNOSIS — M79.672 HEEL PAIN, BILATERAL: ICD-10-CM

## 2020-12-17 DIAGNOSIS — M76.822 POSTERIOR TIBIAL TENDON DYSFUNCTION (PTTD) OF BOTH LOWER EXTREMITIES: ICD-10-CM

## 2020-12-17 DIAGNOSIS — D17.24 LIPOMA OF LEFT LOWER EXTREMITY: ICD-10-CM

## 2020-12-17 DIAGNOSIS — M79.671 HEEL PAIN, BILATERAL: ICD-10-CM

## 2020-12-17 DIAGNOSIS — D17.20 LIPOMA OF LOWER EXTREMITY, UNSPECIFIED LATERALITY: ICD-10-CM

## 2020-12-17 PROCEDURE — 99204 PR OFFICE/OUTPT VISIT, NEW, LEVL IV, 45-59 MIN: ICD-10-PCS | Mod: S$GLB,,, | Performed by: PODIATRIST

## 2020-12-17 PROCEDURE — 1125F AMNT PAIN NOTED PAIN PRSNT: CPT | Mod: S$GLB,,, | Performed by: PODIATRIST

## 2020-12-17 PROCEDURE — 3288F FALL RISK ASSESSMENT DOCD: CPT | Mod: CPTII,S$GLB,, | Performed by: PODIATRIST

## 2020-12-17 PROCEDURE — 3288F PR FALLS RISK ASSESSMENT DOCUMENTED: ICD-10-PCS | Mod: CPTII,S$GLB,, | Performed by: PODIATRIST

## 2020-12-17 PROCEDURE — 1125F PR PAIN SEVERITY QUANTIFIED, PAIN PRESENT: ICD-10-PCS | Mod: S$GLB,,, | Performed by: PODIATRIST

## 2020-12-17 PROCEDURE — 1101F PT FALLS ASSESS-DOCD LE1/YR: CPT | Mod: CPTII,S$GLB,, | Performed by: PODIATRIST

## 2020-12-17 PROCEDURE — 73630 XR FOOT COMPLETE 3 VIEW BILATERAL: ICD-10-PCS | Mod: 50,S$GLB,, | Performed by: RADIOLOGY

## 2020-12-17 PROCEDURE — 99204 OFFICE O/P NEW MOD 45 MIN: CPT | Mod: S$GLB,,, | Performed by: PODIATRIST

## 2020-12-17 PROCEDURE — 1101F PR PT FALLS ASSESS DOC 0-1 FALLS W/OUT INJ PAST YR: ICD-10-PCS | Mod: CPTII,S$GLB,, | Performed by: PODIATRIST

## 2020-12-17 PROCEDURE — 73630 X-RAY EXAM OF FOOT: CPT | Mod: 50,S$GLB,, | Performed by: RADIOLOGY

## 2020-12-17 PROCEDURE — 1159F MED LIST DOCD IN RCRD: CPT | Mod: S$GLB,,, | Performed by: PODIATRIST

## 2020-12-17 PROCEDURE — 1159F PR MEDICATION LIST DOCUMENTED IN MEDICAL RECORD: ICD-10-PCS | Mod: S$GLB,,, | Performed by: PODIATRIST

## 2020-12-17 RX ORDER — ARIPIPRAZOLE 10 MG/1
10 TABLET ORAL DAILY
Status: ON HOLD | COMMUNITY
End: 2024-03-06 | Stop reason: HOSPADM

## 2020-12-17 RX ORDER — MIRTAZAPINE 15 MG/1
15 TABLET, FILM COATED ORAL NIGHTLY
Status: ON HOLD | COMMUNITY
Start: 2020-12-02 | End: 2023-11-20 | Stop reason: HOSPADM

## 2020-12-17 RX ORDER — LUBIPROSTONE 8 UG/1
8 CAPSULE, GELATIN COATED ORAL 2 TIMES DAILY WITH MEALS
Status: ON HOLD | COMMUNITY
Start: 2020-09-24 | End: 2024-03-06 | Stop reason: HOSPADM

## 2020-12-17 RX ORDER — DULOXETIN HYDROCHLORIDE 30 MG/1
30 CAPSULE, DELAYED RELEASE ORAL DAILY
COMMUNITY
Start: 2020-10-15

## 2020-12-17 RX ORDER — LANCETS 33 GAUGE
1 EACH MISCELLANEOUS 2 TIMES DAILY
COMMUNITY
Start: 2020-11-22

## 2020-12-17 RX ORDER — ROSUVASTATIN CALCIUM 20 MG/1
20 TABLET, COATED ORAL DAILY
COMMUNITY
Start: 2020-11-11

## 2020-12-17 RX ORDER — LEVOTHYROXINE SODIUM 175 UG/1
175 TABLET ORAL DAILY
COMMUNITY
Start: 2020-11-22

## 2020-12-17 NOTE — LETTER
December 17, 2020      Michela Christian MD  [address] [facility]  [address]  [place] [address]           [facility] Podiat[facility]  [address] [address]  Phone: [phone]  Fax: [phone]          Patient: Milly Lee   MR Number: [id number]   YOB: 1935   Date of Visit: 12/17/2020       Dear Dr. Michela Christian:    Thank you for referring Milly Lee to me for evaluation. Attached you will find relevant portions of my assessment and plan of care.    If you have questions, please do not hesitate to call me. I look forward to following Milly Lee along with you.    Sincerely,    Jaylon Quintana, ARSENIO    Enclosure  CC:  No Recipients    If you would like to receive this communication electronically, please contact [email or web address] or [phone] to request more information on [employer] Link access.    For providers and/or their staff who would like to refer a patient to [facility], please contact us through our one-stop-shop provider referral line, [facility], at [phone].    If you feel you have received this communication in error or would no longer like to receive these types of communications, please e-mail [email or web address]

## 2020-12-17 NOTE — PROGRESS NOTES
"  [address] [place] [address]  Phone: [phone]   [phone]    Patient's PCP:Michela Christian MD  Referring Provider: Dr. Michela Christian    Subjective:      Chief Complaint:: Foot Pain (bilateral foot pain)    FAUSTINO Lee is a 85 y.o. female who presents with a complaint of bilateral foot pain lasting for months. Onset of symptoms sharp apin and reports no trauma.  Current symptoms include bilateral heel pain worse in the right than left, and lateral aspect dorsal view.  Aggravating factors are walking, weightbearing first steps in the morning, and increased pain upon rising. Symptoms have progressed. Treatment to date have included prior x-rays and [facility], Patient can not recall results .    Systemic Doctor:Michela Christian MD  Date Last Seen: 11/2020  Blood Sugar: 200  Hemoglobin A1c: Not listed in epic    Vitals:    12/17/20 1126   Pulse: 98   Resp: 18   Weight: 91.6 kg (202 lb)   Height: 5' 2" (1.575 m)   PainSc:   5      Shoe Size:     Past Surgical History:   Procedure Laterality Date    CARDIAC CATHETERIZATION      CARDIAC PACEMAKER PLACEMENT  2007    CARDIAC VALVE SURGERY      CHOLECYSTECTOMY      CORONARY ARTERY BYPASS GRAFT      HYSTERECTOMY      TOTAL KNEE ARTHROPLASTY Right     TOTAL KNEE ARTHROPLASTY Left      Past Medical History:   Diagnosis Date    Arthritis     Coronary artery disease     Diverticulitis     GERD (gastroesophageal reflux disease)     Hyperlipidemia     Hypertension      History reviewed. No pertinent family history.     Social History:   Marital Status:   Alcohol History:  reports no history of alcohol use.  Tobacco History:  reports that she has quit smoking. She has never used smokeless tobacco.  Drug History:  reports no history of drug use.    Review of patient's allergies indicates:  No Known Allergies    Current Outpatient Medications   Medication Sig Dispense Refill    albuterol (VENTOLIN HFA) 90 " mcg/actuation inhaler Inhale into the lungs.      ALPRAZolam (XANAX) 0.25 MG tablet Take 0.25 mg by mouth once daily.       AMITIZA 8 mcg Cap       apixaban (ELIQUIS) 5 mg Tab Take 1 tablet (5 mg total) by mouth 2 (two) times daily. 180 tablet 3    ARIPiprazole (ABILIFY) 10 MG Tab Take 10 mg by mouth.      aspirin (ECOTRIN) 81 MG EC tablet Take 81 mg by mouth once daily.      benazepril (LOTENSIN) 10 MG tablet Take 10 mg by mouth once daily.       CALCIUM CARB/MAG OXIDE/CU/ZINC (CALCIUM-MAGNESIUM-COPPER-ZINC ORAL) Take 1 tablet by mouth once daily.      cinnamon bark (CINNAMON) 500 mg capsule Take 1,000 mg by mouth 2 (two) times daily.      CONTOUR TEST STRIPS Strp USE TO TEST BLOOD SUGAR BID  1    cyanocobalamin 500 MCG tablet Take 1 tablet by mouth.      digoxin (LANOXIN) 125 mcg tablet Take 1 tablet (125 mcg total) by mouth once daily. 90 tablet 3    diltiaZEM (CARDIZEM CD) 120 MG Cp24 Take 1 capsule (120 mg total) by mouth once daily. 90 capsule 1    docusate sodium (COLACE) 50 MG capsule Take 100 mg by mouth 2 (two) times daily.       DULoxetine (CYMBALTA) 30 MG capsule       escitalopram oxalate (LEXAPRO) 20 MG tablet Take 0.5 tablets (10 mg total) by mouth once daily.      furosemide (LASIX) 40 MG tablet Take 40 mg by mouth 2 (two) times daily.       ibuprofen (ADVIL,MOTRIN) 200 MG tablet Take 200 mg by mouth every 6 (six) hours as needed for Pain.      insulin NPH-insulin regular, 70/30, (NOVOLIN 70/30) 100 unit/mL (70-30) injection Inject 40 Units into the skin.      ipratropium-albuterol (COMBIVENT)  mcg/actuation inhaler Inhale into the lungs.      levothyroxine (SYNTHROID) 137 MCG Tab tablet       magnesium oxide (MAG-OX) 250 mg Tab Take 400 mg by mouth.      metoprolol succinate (TOPROL-XL) 100 MG 24 hr tablet Take 1 tablet (100 mg total) by mouth once daily. (Patient taking differently: Take 100 mg by mouth 2 (two) times daily. )      mirtazapine (REMERON) 15 MG tablet     "   nitroGLYCERIN (NITROSTAT) 0.4 MG SL tablet Place 0.4 mg under the tongue every 5 (five) minutes as needed for Chest pain.      NOVOLOG MIX 70-30 FLEXPEN 100 unit/mL (70-30) InPn pen Inject into the skin 2 (two) times daily. 35 units if glucose <150  40 units if glucose >150      omega-3 fatty acids-fish oil 340-1,000 mg Cap Take 1 capsule by mouth once daily. 90 capsule 3    ondansetron (ZOFRAN) 8 MG tablet Take 8 mg by mouth every 8 (eight) hours as needed for Nausea.       pantoprazole (PROTONIX) 20 MG tablet Take 20 mg by mouth once daily.       potassium gluconate 2.5 mEq Tab Take 1 tablet by mouth.      ranitidine (ZANTAC) 300 MG capsule Take 300 mg by mouth nightly.      rosuvastatin (CRESTOR) 20 MG tablet       simvastatin (ZOCOR) 20 MG tablet Take 20 mg by mouth every evening.       sotaloL (BETAPACE) 80 MG tablet Take 0.5 tablets (40 mg total) by mouth 2 (two) times daily. 45 tablet 3    traZODone (DESYREL) 100 MG tablet Take 50 mg by mouth every evening.       TRUE METRIX GLUCOSE METER Misc       TRUEPLUS LANCETS 28 gauge Misc       TRUEPLUS LANCETS 33 gauge Misc       TRUEPLUS PEN NEEDLE 31 gauge x 1/4" Ndle        No current facility-administered medications for this visit.        Review of Systems      Objective:        Physical Exam:   Foot Exam    General  General Appearance: appears stated age and healthy   Orientation: alert and oriented to person, place, and time   Affect: appropriate   Gait: antalgic       Right Foot/Ankle     Inspection and Palpation  Ecchymosis: none  Tenderness: (Pain throughout the ankle joint mid tarsal joint posterior tibial tendon)  Swelling: (Plus one pitting edema lower extremity.  Swelling throughout ankle joint and mid tarsal)  Arch: normal  Hammertoes: absent  Claw Toes: absent  Hallux valgus: no  Hallux limitus: no  Skin Exam: skin intact; (Lipoma anterior lateral ankle)    Neurovascular  Dorsalis pedis: 1+  Posterior tibial: 1+  Saphenous nerve " sensation: normal  Tibial nerve sensation: normal  Superficial peroneal nerve sensation: normal  Deep peroneal nerve sensation: normal  Sural nerve sensation: normal    Muscle Strength  Ankle dorsiflexion: 5  Ankle plantar flexion: 5  Ankle inversion: 5  Ankle eversion: 5  Great toe extension: 5  Great toe flexion: 5    Range of Motion    Normal right ankle ROM      Left Foot/Ankle      Inspection and Palpation  Ecchymosis: none  Tenderness: (Pain throughout the ankle joint mid tarsal joint and posterior tibial tendon)  Swelling: (Plus one pitting edema lower extremity pain and swelling to ankle in mid tarsal joint)  Arch: normal  Hammertoes: absent  Claw toes: absent  Hallux valgus: no  Hallux limitus: no  Skin Exam: skin intact; (Lipoma anterior lateral ankle)    Neurovascular  Dorsalis pedis: 1+  Posterior tibial: 1+  Saphenous nerve sensation: normal  Tibial nerve sensation: normal  Superficial peroneal nerve sensation: normal  Deep peroneal nerve sensation: normal  Sural nerve sensation: normal    Muscle Strength  Ankle dorsiflexion: 5  Ankle plantar flexion: 5  Ankle inversion: 5  Ankle eversion: 5  Great toe extension: 5  Great toe flexion: 5    Range of Motion    Normal left ankle ROM          Physical Exam   Cardiovascular:   Pulses:       Dorsalis pedis pulses are 1+ on the right side and 1+ on the left side.        Posterior tibial pulses are 1+ on the right side and 1+ on the left side.   Musculoskeletal:      Right foot: No bunion.      Left foot: No bunion.       Imaging:   AP, lateral, lateral oblique weight-bearing x-rays bilateral feet:  No acute fractures, no bone tumors, no soft tissue masses seen.  Osteo proceed is present.  Inferior posterior calcaneal exostosis bilateral.  Enlarged tuberosity of navicular bilateral. No other pathology noted. Mild degenerative joint disease throughout mid tarsal and Lisfranc joint.         Assessment:       1. Bilateral foot pain    2. Heel pain, bilateral    3.  Lanre's deformity of right heel    4. Lipoma of left lower extremity - Left Foot    5. Lipoma of lower extremity, unspecified laterality - Right Foot    6. Posterior tibial tendon dysfunction (PTTD) of both lower extremities      Plan:   Bilateral foot pain  -     X-Ray Foot Complete Bilateral    Heel pain, bilateral  -     X-Ray Foot Complete Bilateral    Lanre's deformity of right heel    Lipoma of left lower extremity - Left Foot    Lipoma of lower extremity, unspecified laterality - Right Foot    Posterior tibial tendon dysfunction (PTTD) of both lower extremities     1.  Evaluate the patient describes clinical findings of multiple problems with her feet that are relatively bile for her age.  She has some mild arthritis.  Some mild posterior tibial dysfunction.  She does have Lanre's deformities and some plantar fasciitis.  She also has 2 lipoma is on the anterior lateral aspect of each ankle.  I explained to her this age a would not be doing any surgery on her I am recommending she get SAS shoes and use Spenco cross  inserts to try to take some of the stress off her ft.  I explained to her that I do not know how much relief this will give her but is part of the conservative care for this.  If she is doing better in 3 months she will not return to see me if she still having lot of pain will consider physical therapy and return to see me for evaluation.  No follow-ups on file.    Procedures  No notes on file       Counseling:   posterior tibial tendon dysfuncton stage 1, 2, 3 ,4 and the long term treatment of each stage and the possible complications of each stage.  I explained the possible need of an MRI of the ankle if the pain continues.    Discussed treatment of Achilles tendonitis with rest, ice, oral NSAID, topical antiinflammatory creams, heel lifts, no barefoot, and cam walker boot if needed. Explained the importance of proper stretching. Discussed possibility of MRI for further evaluation if  symptoms do not improve.     I discussed the conservative treatent of arthritis consisiting of shoe modification, OTC NSAID, cortisone shots, a series of supartz injections, avoiding painful activities and common sense.  I explained that the arthritis may become more painful causng more disability and the possibility of further treatment.  Also discussed may need evaluation by Rheumatologist for possible inflammatory arthritis.  I provided patient education verbally regarding:   Patient diagnosis, treatment options, as well as alternatives, risks, and benefits.     This note was created using Dragon voice recognition software that occasionally misinterpreted phrases or words.

## 2020-12-22 ENCOUNTER — TELEPHONE (OUTPATIENT)
Dept: PODIATRY | Facility: CLINIC | Age: 85
End: 2020-12-22

## 2020-12-22 NOTE — TELEPHONE ENCOUNTER
Dr. Christian's office called, asking if Dr. CALVO is OK with patient getting regular diabetic shoes due to cost of SAS shoes and patient qualifies for regular diabetic shoes.    Per Dr. CALVO, he is OK with that. Relayed information.

## 2021-02-12 ENCOUNTER — TELEPHONE (OUTPATIENT)
Dept: ENDOCRINOLOGY | Facility: CLINIC | Age: 86
End: 2021-02-12

## 2021-07-22 ENCOUNTER — HOSPITAL ENCOUNTER (EMERGENCY)
Facility: HOSPITAL | Age: 86
Discharge: HOME | End: 2021-07-22
Attending: EMERGENCY MEDICINE
Payer: MEDICARE

## 2021-07-22 VITALS
BODY MASS INDEX: 38.52 KG/M2 | TEMPERATURE: 98 F | RESPIRATION RATE: 18 BRPM | SYSTOLIC BLOOD PRESSURE: 150 MMHG | DIASTOLIC BLOOD PRESSURE: 85 MMHG | OXYGEN SATURATION: 96 % | HEART RATE: 91 BPM | HEIGHT: 60 IN | WEIGHT: 196.19 LBS

## 2021-07-22 DIAGNOSIS — R06.02 SHORT OF BREATH ON EXERTION: Primary | ICD-10-CM

## 2021-07-22 LAB
ALBUMIN SERPL BCP-MCNC: 3.6 G/DL (ref 3.5–5.2)
ALP SERPL-CCNC: 67 U/L (ref 55–135)
ALT SERPL W/O P-5'-P-CCNC: 35 U/L (ref 10–44)
ANION GAP SERPL CALC-SCNC: 10 MMOL/L (ref 8–16)
AST SERPL-CCNC: 52 U/L (ref 10–40)
BASOPHILS # BLD AUTO: 0.08 K/UL (ref 0–0.2)
BASOPHILS NFR BLD: 0.9 % (ref 0–1.9)
BILIRUB SERPL-MCNC: 0.9 MG/DL (ref 0.1–1)
BNP SERPL-MCNC: 169 PG/ML (ref 0–99)
BUN SERPL-MCNC: 20 MG/DL (ref 8–23)
CALCIUM SERPL-MCNC: 9.3 MG/DL (ref 8.7–10.5)
CHLORIDE SERPL-SCNC: 105 MMOL/L (ref 95–110)
CK MB SERPL-MCNC: 2.7 NG/ML (ref 0.1–6.5)
CK SERPL-CCNC: 96 U/L (ref 20–180)
CO2 SERPL-SCNC: 26 MMOL/L (ref 23–29)
CREAT SERPL-MCNC: 0.9 MG/DL (ref 0.5–1.4)
DIFFERENTIAL METHOD: ABNORMAL
EOSINOPHIL # BLD AUTO: 0.2 K/UL (ref 0–0.5)
EOSINOPHIL NFR BLD: 2.3 % (ref 0–8)
ERYTHROCYTE [DISTWIDTH] IN BLOOD BY AUTOMATED COUNT: 16.2 % (ref 11.5–14.5)
EST. GFR  (AFRICAN AMERICAN): >60 ML/MIN/1.73 M^2
EST. GFR  (NON AFRICAN AMERICAN): 58.5 ML/MIN/1.73 M^2
GLUCOSE SERPL-MCNC: 81 MG/DL (ref 70–110)
HCT VFR BLD AUTO: 34.9 % (ref 37–48.5)
HGB BLD-MCNC: 10.8 G/DL (ref 12–16)
IMM GRANULOCYTES # BLD AUTO: 0.03 K/UL (ref 0–0.04)
IMM GRANULOCYTES NFR BLD AUTO: 0.3 % (ref 0–0.5)
LYMPHOCYTES # BLD AUTO: 1.9 K/UL (ref 1–4.8)
LYMPHOCYTES NFR BLD: 21.7 % (ref 18–48)
MAGNESIUM SERPL-MCNC: 1.7 MG/DL (ref 1.6–2.6)
MCH RBC QN AUTO: 26.2 PG (ref 27–31)
MCHC RBC AUTO-ENTMCNC: 30.9 G/DL (ref 32–36)
MCV RBC AUTO: 85 FL (ref 82–98)
MONOCYTES # BLD AUTO: 0.8 K/UL (ref 0.3–1)
MONOCYTES NFR BLD: 9.4 % (ref 4–15)
NEUTROPHILS # BLD AUTO: 5.7 K/UL (ref 1.8–7.7)
NEUTROPHILS NFR BLD: 65.4 % (ref 38–73)
NRBC BLD-RTO: 0 /100 WBC
PLATELET # BLD AUTO: 184 K/UL (ref 150–450)
PMV BLD AUTO: 11.1 FL (ref 9.2–12.9)
POTASSIUM SERPL-SCNC: 3.6 MMOL/L (ref 3.5–5.1)
PROT SERPL-MCNC: 7.3 G/DL (ref 6–8.4)
RBC # BLD AUTO: 4.12 M/UL (ref 4–5.4)
SODIUM SERPL-SCNC: 141 MMOL/L (ref 136–145)
TROPONIN I SERPL DL<=0.01 NG/ML-MCNC: <0.03 NG/ML
WBC # BLD AUTO: 8.65 K/UL (ref 3.9–12.7)

## 2021-07-22 PROCEDURE — 82550 ASSAY OF CK (CPK): CPT | Performed by: EMERGENCY MEDICINE

## 2021-07-22 PROCEDURE — 94640 AIRWAY INHALATION TREATMENT: CPT

## 2021-07-22 PROCEDURE — 82553 CREATINE MB FRACTION: CPT | Performed by: EMERGENCY MEDICINE

## 2021-07-22 PROCEDURE — 99900031 HC PATIENT EDUCATION (STAT)

## 2021-07-22 PROCEDURE — 96374 THER/PROPH/DIAG INJ IV PUSH: CPT

## 2021-07-22 PROCEDURE — 63600175 PHARM REV CODE 636 W HCPCS: Performed by: EMERGENCY MEDICINE

## 2021-07-22 PROCEDURE — 83880 ASSAY OF NATRIURETIC PEPTIDE: CPT | Performed by: EMERGENCY MEDICINE

## 2021-07-22 PROCEDURE — 25000242 PHARM REV CODE 250 ALT 637 W/ HCPCS: Performed by: EMERGENCY MEDICINE

## 2021-07-22 PROCEDURE — 80053 COMPREHEN METABOLIC PANEL: CPT | Performed by: EMERGENCY MEDICINE

## 2021-07-22 PROCEDURE — 84484 ASSAY OF TROPONIN QUANT: CPT | Performed by: EMERGENCY MEDICINE

## 2021-07-22 PROCEDURE — 94761 N-INVAS EAR/PLS OXIMETRY MLT: CPT

## 2021-07-22 PROCEDURE — 85025 COMPLETE CBC W/AUTO DIFF WBC: CPT | Performed by: EMERGENCY MEDICINE

## 2021-07-22 PROCEDURE — 93010 EKG 12-LEAD: ICD-10-PCS | Mod: ,,, | Performed by: INTERNAL MEDICINE

## 2021-07-22 PROCEDURE — 99900035 HC TECH TIME PER 15 MIN (STAT)

## 2021-07-22 PROCEDURE — 93005 ELECTROCARDIOGRAM TRACING: CPT | Performed by: INTERNAL MEDICINE

## 2021-07-22 PROCEDURE — 93010 ELECTROCARDIOGRAM REPORT: CPT | Mod: ,,, | Performed by: INTERNAL MEDICINE

## 2021-07-22 PROCEDURE — 99285 EMERGENCY DEPT VISIT HI MDM: CPT | Mod: 25

## 2021-07-22 PROCEDURE — 83735 ASSAY OF MAGNESIUM: CPT | Performed by: EMERGENCY MEDICINE

## 2021-07-22 RX ORDER — FLUOROMETHOLONE 1 MG/ML
1 SUSPENSION/ DROPS OPHTHALMIC 3 TIMES DAILY
Status: ON HOLD | COMMUNITY
End: 2023-11-19

## 2021-07-22 RX ORDER — IPRATROPIUM BROMIDE AND ALBUTEROL SULFATE 2.5; .5 MG/3ML; MG/3ML
3 SOLUTION RESPIRATORY (INHALATION)
Status: COMPLETED | OUTPATIENT
Start: 2021-07-22 | End: 2021-07-22

## 2021-07-22 RX ORDER — METHYLPREDNISOLONE SOD SUCC 125 MG
125 VIAL (EA) INJECTION
Status: COMPLETED | OUTPATIENT
Start: 2021-07-22 | End: 2021-07-22

## 2021-07-22 RX ADMIN — IPRATROPIUM BROMIDE AND ALBUTEROL SULFATE 3 ML: .5; 3 SOLUTION RESPIRATORY (INHALATION) at 08:07

## 2021-07-22 RX ADMIN — METHYLPREDNISOLONE SODIUM SUCCINATE 125 MG: 125 INJECTION, POWDER, FOR SOLUTION INTRAMUSCULAR; INTRAVENOUS at 09:07

## 2021-08-23 ENCOUNTER — TELEPHONE (OUTPATIENT)
Dept: ENDOCRINOLOGY | Facility: CLINIC | Age: 86
End: 2021-08-23

## 2023-08-30 ENCOUNTER — OFFICE VISIT (OUTPATIENT)
Dept: PODIATRY | Facility: CLINIC | Age: 88
End: 2023-08-30
Payer: MEDICARE

## 2023-08-30 VITALS — BODY MASS INDEX: 34.95 KG/M2 | HEIGHT: 60 IN | WEIGHT: 178 LBS

## 2023-08-30 DIAGNOSIS — E11.51 TYPE II DIABETES MELLITUS WITH PERIPHERAL CIRCULATORY DISORDER: ICD-10-CM

## 2023-08-30 DIAGNOSIS — E11.42 DIABETIC POLYNEUROPATHY ASSOCIATED WITH TYPE 2 DIABETES MELLITUS: Primary | ICD-10-CM

## 2023-08-30 DIAGNOSIS — I73.9 PERIPHERAL VASCULAR DISEASE: ICD-10-CM

## 2023-08-30 DIAGNOSIS — L60.9 DISEASE OF NAIL: ICD-10-CM

## 2023-08-30 PROCEDURE — 1159F PR MEDICATION LIST DOCUMENTED IN MEDICAL RECORD: ICD-10-PCS | Mod: CPTII,,, | Performed by: PODIATRIST

## 2023-08-30 PROCEDURE — 11721 ROUTINE FOOT CARE: ICD-10-PCS | Mod: Q9,,, | Performed by: PODIATRIST

## 2023-08-30 PROCEDURE — 1125F PR PAIN SEVERITY QUANTIFIED, PAIN PRESENT: ICD-10-PCS | Mod: CPTII,,, | Performed by: PODIATRIST

## 2023-08-30 PROCEDURE — 1125F AMNT PAIN NOTED PAIN PRSNT: CPT | Mod: CPTII,,, | Performed by: PODIATRIST

## 2023-08-30 PROCEDURE — 99499 UNLISTED E&M SERVICE: CPT | Mod: ,,, | Performed by: PODIATRIST

## 2023-08-30 PROCEDURE — 99499 NO LOS: ICD-10-PCS | Mod: ,,, | Performed by: PODIATRIST

## 2023-08-30 PROCEDURE — 1160F RVW MEDS BY RX/DR IN RCRD: CPT | Mod: CPTII,,, | Performed by: PODIATRIST

## 2023-08-30 PROCEDURE — 1160F PR REVIEW ALL MEDS BY PRESCRIBER/CLIN PHARMACIST DOCUMENTED: ICD-10-PCS | Mod: CPTII,,, | Performed by: PODIATRIST

## 2023-08-30 PROCEDURE — 11721 DEBRIDE NAIL 6 OR MORE: CPT | Mod: Q9,,, | Performed by: PODIATRIST

## 2023-08-30 PROCEDURE — 1159F MED LIST DOCD IN RCRD: CPT | Mod: CPTII,,, | Performed by: PODIATRIST

## 2023-08-30 RX ORDER — MULTIVIT-MIN/IRON/FOLIC ACID/K 18-600-40
500 CAPSULE ORAL DAILY
COMMUNITY

## 2023-08-30 RX ORDER — LANOLIN ALCOHOL/MO/W.PET/CERES
CREAM (GRAM) TOPICAL
Status: ON HOLD | COMMUNITY
Start: 2023-03-31 | End: 2023-11-19 | Stop reason: SDUPTHER

## 2023-08-30 RX ORDER — GLUCAGON 3 MG/1
1 POWDER NASAL DAILY PRN
COMMUNITY
Start: 2023-07-12

## 2023-08-30 RX ORDER — DOXYCYCLINE HYCLATE 100 MG
100 TABLET ORAL 2 TIMES DAILY
Status: ON HOLD | COMMUNITY
Start: 2023-06-13 | End: 2023-11-19

## 2023-08-30 RX ORDER — BUDESONIDE 1 MG/2ML
1 INHALANT ORAL
Status: ON HOLD | COMMUNITY
Start: 2022-12-09 | End: 2024-03-06 | Stop reason: HOSPADM

## 2023-08-30 RX ORDER — FLUTICASONE PROPIONATE 50 MCG
2 SPRAY, SUSPENSION (ML) NASAL
Status: ON HOLD | COMMUNITY
End: 2023-11-19

## 2023-08-30 RX ORDER — INHALER, ASSIST DEVICES
SPACER (EA) MISCELLANEOUS
COMMUNITY
Start: 2023-07-19

## 2023-08-30 RX ORDER — METOPROLOL TARTRATE 100 MG/1
100 TABLET ORAL 2 TIMES DAILY
Status: ON HOLD | COMMUNITY
End: 2023-11-20 | Stop reason: HOSPADM

## 2023-08-30 RX ORDER — NYSTATIN 100000 U/G
100000 CREAM TOPICAL 2 TIMES DAILY
COMMUNITY

## 2023-08-30 RX ORDER — HYDROXYZINE HYDROCHLORIDE 25 MG/1
25 TABLET, FILM COATED ORAL 3 TIMES DAILY PRN
Status: ON HOLD | COMMUNITY
Start: 2023-02-03 | End: 2023-11-20 | Stop reason: HOSPADM

## 2023-08-30 RX ORDER — HYDROCORTISONE 25 MG/G
1 CREAM TOPICAL
COMMUNITY

## 2023-08-30 RX ORDER — OMEGA-3-ACID ETHYL ESTERS 1 G/1
1 CAPSULE, LIQUID FILLED ORAL DAILY
Status: ON HOLD | COMMUNITY
End: 2023-11-19 | Stop reason: SDUPTHER

## 2023-08-30 RX ORDER — DICLOFENAC SODIUM 10 MG/G
4 GEL TOPICAL 2 TIMES DAILY
COMMUNITY
Start: 2023-06-16

## 2023-09-13 NOTE — PROGRESS NOTES
"Subjective:     Patient ID: Milly Lee is a 88 y.o. female.    Chief Complaint: Diabetic Foot Exam and Foot Pain    Milly is a 88 y.o. female who presents to the clinic for evaluation and treatment of high risk feet. Milly has a past medical history of Arthritis, Coronary artery disease, Diverticulitis, GERD (gastroesophageal reflux disease), Hyperlipidemia, and Hypertension. The patient's chief complaint is long, thick toenails. This patient has documented high risk feet requiring routine maintenance secondary to peripheral vascular disease.    PCP: Michela Christian MD    Date Last Seen by PCP: 07/2023    Current shoe gear:  Affected Foot: Slip-on shoes     Unaffected Foot: Slip-on shoes    No results found for: "HGBA1C"    Review of Systems   Constitutional: Negative for chills and fever.   Cardiovascular:  Positive for leg swelling. Negative for chest pain.   Respiratory:  Negative for cough and shortness of breath.    Gastrointestinal:  Negative for diarrhea, nausea and vomiting.   Neurological:  Positive for numbness and paresthesias.        Objective:     Physical Exam  Vitals reviewed.   Constitutional:       General: She is not in acute distress.     Appearance: Normal appearance. She is not ill-appearing.   HENT:      Head: Normocephalic.      Nose: Nose normal.   Pulmonary:      Effort: Pulmonary effort is normal. No respiratory distress.   Skin:     Capillary Refill: Capillary refill takes 2 to 3 seconds.   Neurological:      Mental Status: She is alert and oriented to person, place, and time.   Psychiatric:         Mood and Affect: Mood normal.         Behavior: Behavior normal.         Thought Content: Thought content normal.         Judgment: Judgment normal.       Neurologic:  Protective and light touch sensation diminished distal portion of bilateral forefoot, positive paresthesias reported   Musculoskeletal:  5/5 muscle strength noted bilateral foot, ankle joint range of motion is " reduced with mild pain to right foot, no masses noted bilateral foot  Vascular:  DP and PT pulses palpable 1/4 bilateral foot, +1 nonpitting edema noted to the bilateral ankle, right foot greater than left, capillary fill time less than 3 seconds to distal digits bilateral foot, pedal hair growth is absent bilateral lower extremity, hyperpigmentation noted to the lower 1/3 bilateral lower extremity right worse than left, skin temperature gradient warm to cool from proximal to distal bilateral lower extremity   Dermatologic:  Nails 1 through 5 bilateral are thickened elongated and discolored, no open lesions noted bilateral foot, no rashes noted bilateral foot, pedal skin is thin shiny atrophic    Assessment:      Encounter Diagnoses   Name Primary?    Diabetic polyneuropathy associated with type 2 diabetes mellitus Yes    Disease of nail     Type II diabetes mellitus with peripheral circulatory disorder     Peripheral vascular disease      Plan:     Milly was seen today for diabetic foot exam and foot pain.    Diagnoses and all orders for this visit:    Diabetic polyneuropathy associated with type 2 diabetes mellitus  -     Routine Foot Care    Disease of nail  -     Routine Foot Care    Type II diabetes mellitus with peripheral circulatory disorder  -     Routine Foot Care    Peripheral vascular disease  -     Routine Foot Care      I counseled the patient on her conditions, their implications and medical management.        1. Patient was examined and evaluated.    2. Discussed patient etiology of peripheral vascular changes and associated swelling to the right lower extremity.  Patient was advised elevate lower extremity when at rest and consider daily use of compression stockings.  Patient will monitor dietary salt intake water consumption.  Patient was advised to adjunct with OTC analgesics for pain relief.  Routine Foot Care    Date/Time: 8/30/2023 10:45 AM    Performed by: Alicia Clark DPM  Authorized  by: Alicia Clark DPM    Consent Done?:  Yes (Verbal)    Nail Care Type:  Debride  Location(s): All  (Left 1st Toe, Left 3rd Toe, Left 2nd Toe, Left 4th Toe, Left 5th Toe, Right 1st Toe, Right 2nd Toe, Right 3rd Toe, Right 4th Toe and Right 5th Toe)  Patient tolerance:  Patient tolerated the procedure well with no immediate complications      3. Discussed patient etiology of fungal nail changes.  Discussed OTC topical conservative treatments such as Vicks vapor rub and tea tree oil with the patient.    4. Patient was advised to continue with daily foot monitoring.  Patient continue efforts proper glycemic control, lowering hemoglobin A1c, adherence to diabetic medication regimen   5. Reviewed patient's radiographs performed on 07/18/2023 of the right foot.  Patient made aware that she does not have any fracture or injury noted to the right foot.  Patient does have swelling noted on this x-ray.    6. Patient was advised adjust shoe gear for better comfort and fit.  7. Patient will follow-up in 3 months or p.r.n. for complaints

## 2023-11-19 ENCOUNTER — HOSPITAL ENCOUNTER (OUTPATIENT)
Facility: HOSPITAL | Age: 88
Discharge: HOME | End: 2023-11-20
Attending: EMERGENCY MEDICINE | Admitting: INTERNAL MEDICINE
Payer: MEDICARE

## 2023-11-19 DIAGNOSIS — R07.9 CHEST PAIN, UNSPECIFIED TYPE: ICD-10-CM

## 2023-11-19 DIAGNOSIS — M54.9 BACK PAIN, UNSPECIFIED BACK LOCATION, UNSPECIFIED BACK PAIN LATERALITY, UNSPECIFIED CHRONICITY: ICD-10-CM

## 2023-11-19 DIAGNOSIS — R07.9 CHEST PAIN: ICD-10-CM

## 2023-11-19 DIAGNOSIS — R07.1 CHEST PAIN ON BREATHING: Primary | ICD-10-CM

## 2023-11-19 DIAGNOSIS — R06.02 SHORTNESS OF BREATH: ICD-10-CM

## 2023-11-19 PROBLEM — I95.9 HYPOTENSION: Status: ACTIVE | Noted: 2023-11-19

## 2023-11-19 LAB
ALBUMIN SERPL BCP-MCNC: 4 G/DL (ref 3.5–5.2)
ALP SERPL-CCNC: 72 U/L (ref 55–135)
ALT SERPL W/O P-5'-P-CCNC: 21 U/L (ref 10–44)
ANION GAP SERPL CALC-SCNC: 8 MMOL/L (ref 8–16)
AST SERPL-CCNC: 42 U/L (ref 10–40)
BASOPHILS # BLD AUTO: 0.08 K/UL (ref 0–0.2)
BASOPHILS NFR BLD: 1 % (ref 0–1.9)
BILIRUB SERPL-MCNC: 0.9 MG/DL (ref 0.1–1)
BNP SERPL-MCNC: 240 PG/ML (ref 0–99)
BUN SERPL-MCNC: 11 MG/DL (ref 8–23)
CALCIUM SERPL-MCNC: 9.7 MG/DL (ref 8.7–10.5)
CHLORIDE SERPL-SCNC: 103 MMOL/L (ref 95–110)
CO2 SERPL-SCNC: 25 MMOL/L (ref 23–29)
CREAT SERPL-MCNC: 0.9 MG/DL (ref 0.5–1.4)
CREAT SERPL-MCNC: 1 MG/DL (ref 0.5–1.4)
DIFFERENTIAL METHOD: ABNORMAL
EOSINOPHIL # BLD AUTO: 0.2 K/UL (ref 0–0.5)
EOSINOPHIL NFR BLD: 2.4 % (ref 0–8)
ERYTHROCYTE [DISTWIDTH] IN BLOOD BY AUTOMATED COUNT: 15.9 % (ref 11.5–14.5)
EST. GFR  (NO RACE VARIABLE): 54.5 ML/MIN/1.73 M^2
GLUCOSE SERPL-MCNC: 149 MG/DL (ref 70–110)
GLUCOSE SERPL-MCNC: 154 MG/DL (ref 70–110)
HCT VFR BLD AUTO: 36 % (ref 37–48.5)
HGB BLD-MCNC: 12 G/DL (ref 12–16)
IMM GRANULOCYTES # BLD AUTO: 0.02 K/UL (ref 0–0.04)
IMM GRANULOCYTES NFR BLD AUTO: 0.2 % (ref 0–0.5)
LYMPHOCYTES # BLD AUTO: 1.2 K/UL (ref 1–4.8)
LYMPHOCYTES NFR BLD: 14.5 % (ref 18–48)
MCH RBC QN AUTO: 30.7 PG (ref 27–31)
MCHC RBC AUTO-ENTMCNC: 33.3 G/DL (ref 32–36)
MCV RBC AUTO: 92 FL (ref 82–98)
MONOCYTES # BLD AUTO: 0.8 K/UL (ref 0.3–1)
MONOCYTES NFR BLD: 9.4 % (ref 4–15)
NEUTROPHILS # BLD AUTO: 5.8 K/UL (ref 1.8–7.7)
NEUTROPHILS NFR BLD: 72.5 % (ref 38–73)
NRBC BLD-RTO: 0 /100 WBC
PLATELET # BLD AUTO: 167 K/UL (ref 150–450)
PMV BLD AUTO: 11.1 FL (ref 9.2–12.9)
POTASSIUM SERPL-SCNC: 3.8 MMOL/L (ref 3.5–5.1)
PROT SERPL-MCNC: 7.2 G/DL (ref 6–8.4)
RBC # BLD AUTO: 3.91 M/UL (ref 4–5.4)
SAMPLE: NORMAL
SODIUM SERPL-SCNC: 136 MMOL/L (ref 136–145)
TROPONIN I SERPL HS-MCNC: 17.9 PG/ML (ref 0–14.9)
WBC # BLD AUTO: 8.05 K/UL (ref 3.9–12.7)

## 2023-11-19 PROCEDURE — 25000003 PHARM REV CODE 250: Performed by: EMERGENCY MEDICINE

## 2023-11-19 PROCEDURE — 93010 ELECTROCARDIOGRAM REPORT: CPT | Mod: ,,, | Performed by: INTERNAL MEDICINE

## 2023-11-19 PROCEDURE — G0378 HOSPITAL OBSERVATION PER HR: HCPCS

## 2023-11-19 PROCEDURE — 85025 COMPLETE CBC W/AUTO DIFF WBC: CPT | Performed by: EMERGENCY MEDICINE

## 2023-11-19 PROCEDURE — 83880 ASSAY OF NATRIURETIC PEPTIDE: CPT | Performed by: EMERGENCY MEDICINE

## 2023-11-19 PROCEDURE — 82962 GLUCOSE BLOOD TEST: CPT

## 2023-11-19 PROCEDURE — 93010 EKG 12-LEAD: ICD-10-PCS | Mod: ,,, | Performed by: INTERNAL MEDICINE

## 2023-11-19 PROCEDURE — 93005 ELECTROCARDIOGRAM TRACING: CPT | Performed by: INTERNAL MEDICINE

## 2023-11-19 PROCEDURE — 80053 COMPREHEN METABOLIC PANEL: CPT | Performed by: EMERGENCY MEDICINE

## 2023-11-19 PROCEDURE — 99285 EMERGENCY DEPT VISIT HI MDM: CPT | Mod: 25

## 2023-11-19 PROCEDURE — 84484 ASSAY OF TROPONIN QUANT: CPT | Performed by: EMERGENCY MEDICINE

## 2023-11-19 PROCEDURE — 25000003 PHARM REV CODE 250: Performed by: INTERNAL MEDICINE

## 2023-11-19 RX ORDER — IBUPROFEN 200 MG
24 TABLET ORAL
Status: DISCONTINUED | OUTPATIENT
Start: 2023-11-19 | End: 2023-11-20 | Stop reason: HOSPADM

## 2023-11-19 RX ORDER — ATORVASTATIN CALCIUM 40 MG/1
40 TABLET, FILM COATED ORAL DAILY
Status: DISCONTINUED | OUTPATIENT
Start: 2023-11-20 | End: 2023-11-20 | Stop reason: HOSPADM

## 2023-11-19 RX ORDER — ASPIRIN 325 MG
325 TABLET ORAL
Status: COMPLETED | OUTPATIENT
Start: 2023-11-19 | End: 2023-11-19

## 2023-11-19 RX ORDER — GLUCAGON 1 MG
1 KIT INJECTION
Status: DISCONTINUED | OUTPATIENT
Start: 2023-11-19 | End: 2023-11-20 | Stop reason: HOSPADM

## 2023-11-19 RX ORDER — INSULIN ASPART 100 [IU]/ML
0-10 INJECTION, SOLUTION INTRAVENOUS; SUBCUTANEOUS
Status: DISCONTINUED | OUTPATIENT
Start: 2023-11-19 | End: 2023-11-20 | Stop reason: HOSPADM

## 2023-11-19 RX ORDER — SODIUM,POTASSIUM PHOSPHATES 280-250MG
2 POWDER IN PACKET (EA) ORAL
Status: DISCONTINUED | OUTPATIENT
Start: 2023-11-19 | End: 2023-11-20 | Stop reason: HOSPADM

## 2023-11-19 RX ORDER — AZITHROMYCIN 500 MG/1
500 TABLET, FILM COATED ORAL DAILY
Status: ON HOLD | COMMUNITY
Start: 2023-11-13 | End: 2023-11-19

## 2023-11-19 RX ORDER — TOBRAMYCIN AND DEXAMETHASONE 3; 1 MG/ML; MG/ML
1 SUSPENSION/ DROPS OPHTHALMIC EVERY 6 HOURS
COMMUNITY
Start: 2023-10-27

## 2023-11-19 RX ORDER — ASPIRIN 81 MG/1
81 TABLET ORAL DAILY
Status: DISCONTINUED | OUTPATIENT
Start: 2023-11-20 | End: 2023-11-20 | Stop reason: HOSPADM

## 2023-11-19 RX ORDER — SODIUM CHLORIDE 9 MG/ML
INJECTION, SOLUTION INTRAVENOUS CONTINUOUS
Status: ACTIVE | OUTPATIENT
Start: 2023-11-19 | End: 2023-11-20

## 2023-11-19 RX ORDER — ACETAMINOPHEN 325 MG/1
650 TABLET ORAL EVERY 4 HOURS PRN
Status: DISCONTINUED | OUTPATIENT
Start: 2023-11-19 | End: 2023-11-20 | Stop reason: HOSPADM

## 2023-11-19 RX ORDER — SODIUM CHLORIDE 9 MG/ML
1000 INJECTION, SOLUTION INTRAVENOUS
Status: COMPLETED | OUTPATIENT
Start: 2023-11-19 | End: 2023-11-19

## 2023-11-19 RX ORDER — LANOLIN ALCOHOL/MO/W.PET/CERES
800 CREAM (GRAM) TOPICAL
Status: DISCONTINUED | OUTPATIENT
Start: 2023-11-19 | End: 2023-11-20 | Stop reason: HOSPADM

## 2023-11-19 RX ORDER — IBUPROFEN 200 MG
16 TABLET ORAL
Status: DISCONTINUED | OUTPATIENT
Start: 2023-11-19 | End: 2023-11-20 | Stop reason: HOSPADM

## 2023-11-19 RX ORDER — PANTOPRAZOLE SODIUM 20 MG/1
20 TABLET, DELAYED RELEASE ORAL DAILY
Status: DISCONTINUED | OUTPATIENT
Start: 2023-11-20 | End: 2023-11-20 | Stop reason: HOSPADM

## 2023-11-19 RX ADMIN — ASPIRIN 325 MG ORAL TABLET 325 MG: 325 PILL ORAL at 08:11

## 2023-11-19 RX ADMIN — SODIUM CHLORIDE 1000 ML: 0.9 INJECTION, SOLUTION INTRAVENOUS at 07:11

## 2023-11-19 RX ADMIN — SODIUM CHLORIDE: 0.9 INJECTION, SOLUTION INTRAVENOUS at 10:11

## 2023-11-19 RX ADMIN — APIXABAN 5 MG: 5 TABLET, FILM COATED ORAL at 10:11

## 2023-11-20 ENCOUNTER — CLINICAL SUPPORT (OUTPATIENT)
Dept: CARDIOLOGY | Facility: HOSPITAL | Age: 88
End: 2023-11-20
Attending: INTERNAL MEDICINE
Payer: MEDICARE

## 2023-11-20 ENCOUNTER — HOSPITAL ENCOUNTER (OUTPATIENT)
Dept: RADIOLOGY | Facility: HOSPITAL | Age: 88
Discharge: HOME | End: 2023-11-20
Attending: INTERNAL MEDICINE | Admitting: INTERNAL MEDICINE
Payer: MEDICARE

## 2023-11-20 VITALS
RESPIRATION RATE: 16 BRPM | HEART RATE: 82 BPM | HEIGHT: 60 IN | OXYGEN SATURATION: 97 % | SYSTOLIC BLOOD PRESSURE: 111 MMHG | BODY MASS INDEX: 34.69 KG/M2 | WEIGHT: 176.69 LBS | DIASTOLIC BLOOD PRESSURE: 76 MMHG | TEMPERATURE: 98 F

## 2023-11-20 LAB
ALBUMIN SERPL BCP-MCNC: 3.5 G/DL (ref 3.5–5.2)
ALP SERPL-CCNC: 67 U/L (ref 55–135)
ALT SERPL W/O P-5'-P-CCNC: 18 U/L (ref 10–44)
ANION GAP SERPL CALC-SCNC: 6 MMOL/L (ref 8–16)
AST SERPL-CCNC: 30 U/L (ref 10–40)
BILIRUB SERPL-MCNC: 0.6 MG/DL (ref 0.1–1)
BUN SERPL-MCNC: 12 MG/DL (ref 8–23)
CALCIUM SERPL-MCNC: 9.3 MG/DL (ref 8.7–10.5)
CHLORIDE SERPL-SCNC: 103 MMOL/L (ref 95–110)
CO2 SERPL-SCNC: 28 MMOL/L (ref 23–29)
CORTIS SERPL-MCNC: 12.4 UG/DL
CREAT SERPL-MCNC: 1.1 MG/DL (ref 0.5–1.4)
CV PHARM DOSE: 0.4 MG
CV STRESS BASE HR: 80 BPM
DIASTOLIC BLOOD PRESSURE: 53 MMHG
ERYTHROCYTE [DISTWIDTH] IN BLOOD BY AUTOMATED COUNT: 15.9 % (ref 11.5–14.5)
EST. GFR  (NO RACE VARIABLE): 48.6 ML/MIN/1.73 M^2
GLUCOSE SERPL-MCNC: 156 MG/DL (ref 70–110)
HCT VFR BLD AUTO: 34.2 % (ref 37–48.5)
HGB BLD-MCNC: 11.2 G/DL (ref 12–16)
LACTATE SERPL-SCNC: 2.3 MMOL/L (ref 0.5–1.9)
MAGNESIUM SERPL-MCNC: 1.9 MG/DL (ref 1.6–2.6)
MCH RBC QN AUTO: 30.4 PG (ref 27–31)
MCHC RBC AUTO-ENTMCNC: 32.7 G/DL (ref 32–36)
MCV RBC AUTO: 93 FL (ref 82–98)
OHS CV CPX 1 MINUTE RECOVERY HEART RATE: 93 BPM
OHS CV CPX 85 PERCENT MAX PREDICTED HEART RATE MALE: 113
OHS CV CPX MAX PREDICTED HEART RATE: 133
OHS CV CPX PATIENT IS FEMALE: 1
OHS CV CPX PATIENT IS MALE: 0
OHS CV CPX PEAK DIASTOLIC BLOOD PRESSURE: 67 MMHG
OHS CV CPX PEAK HEAR RATE: 98 BPM
OHS CV CPX PEAK RATE PRESSURE PRODUCT: NORMAL
OHS CV CPX PEAK SYSTOLIC BLOOD PRESSURE: 121 MMHG
OHS CV CPX PERCENT MAX PREDICTED HEART RATE ACHIEVED: 76
OHS CV CPX RATE PRESSURE PRODUCT PRESENTING: 9920
PLATELET # BLD AUTO: 141 K/UL (ref 150–450)
PMV BLD AUTO: 10.7 FL (ref 9.2–12.9)
POTASSIUM SERPL-SCNC: 3.8 MMOL/L (ref 3.5–5.1)
PROT SERPL-MCNC: 6.6 G/DL (ref 6–8.4)
RBC # BLD AUTO: 3.69 M/UL (ref 4–5.4)
SODIUM SERPL-SCNC: 137 MMOL/L (ref 136–145)
SYSTOLIC BLOOD PRESSURE: 124 MMHG
TROPONIN I SERPL HS-MCNC: 16.9 PG/ML (ref 0–14.9)
TROPONIN I SERPL HS-MCNC: 18 PG/ML (ref 0–14.9)
WBC # BLD AUTO: 7.01 K/UL (ref 3.9–12.7)

## 2023-11-20 PROCEDURE — 84484 ASSAY OF TROPONIN QUANT: CPT | Performed by: INTERNAL MEDICINE

## 2023-11-20 PROCEDURE — 78452 HT MUSCLE IMAGE SPECT MULT: CPT | Mod: TC

## 2023-11-20 PROCEDURE — 93016 CV STRESS TEST SUPVJ ONLY: CPT | Mod: ,,, | Performed by: INTERNAL MEDICINE

## 2023-11-20 PROCEDURE — 99900031 HC PATIENT EDUCATION (STAT)

## 2023-11-20 PROCEDURE — 93018 CV STRESS TEST I&R ONLY: CPT | Mod: ,,, | Performed by: INTERNAL MEDICINE

## 2023-11-20 PROCEDURE — 36415 COLL VENOUS BLD VENIPUNCTURE: CPT | Performed by: INTERNAL MEDICINE

## 2023-11-20 PROCEDURE — 84484 ASSAY OF TROPONIN QUANT: CPT | Mod: 91 | Performed by: INTERNAL MEDICINE

## 2023-11-20 PROCEDURE — 94640 AIRWAY INHALATION TREATMENT: CPT

## 2023-11-20 PROCEDURE — 93018 NUCLEAR STRESS TEST (CUPID ONLY): ICD-10-PCS | Mod: ,,, | Performed by: INTERNAL MEDICINE

## 2023-11-20 PROCEDURE — 83735 ASSAY OF MAGNESIUM: CPT | Performed by: INTERNAL MEDICINE

## 2023-11-20 PROCEDURE — 36415 COLL VENOUS BLD VENIPUNCTURE: CPT

## 2023-11-20 PROCEDURE — 93016 NUCLEAR STRESS TEST (CUPID ONLY): ICD-10-PCS | Mod: ,,, | Performed by: INTERNAL MEDICINE

## 2023-11-20 PROCEDURE — 25000242 PHARM REV CODE 250 ALT 637 W/ HCPCS

## 2023-11-20 PROCEDURE — 25000003 PHARM REV CODE 250: Performed by: INTERNAL MEDICINE

## 2023-11-20 PROCEDURE — 63600175 PHARM REV CODE 636 W HCPCS: Performed by: INTERNAL MEDICINE

## 2023-11-20 PROCEDURE — 80053 COMPREHEN METABOLIC PANEL: CPT | Performed by: INTERNAL MEDICINE

## 2023-11-20 PROCEDURE — 85027 COMPLETE CBC AUTOMATED: CPT | Performed by: INTERNAL MEDICINE

## 2023-11-20 PROCEDURE — 93017 CV STRESS TEST TRACING ONLY: CPT

## 2023-11-20 PROCEDURE — 87040 BLOOD CULTURE FOR BACTERIA: CPT | Performed by: INTERNAL MEDICINE

## 2023-11-20 PROCEDURE — G0378 HOSPITAL OBSERVATION PER HR: HCPCS

## 2023-11-20 PROCEDURE — A9502 TC99M TETROFOSMIN: HCPCS

## 2023-11-20 PROCEDURE — 94761 N-INVAS EAR/PLS OXIMETRY MLT: CPT

## 2023-11-20 PROCEDURE — 83605 ASSAY OF LACTIC ACID: CPT

## 2023-11-20 PROCEDURE — 82533 TOTAL CORTISOL: CPT | Performed by: INTERNAL MEDICINE

## 2023-11-20 RX ORDER — HYDROCODONE BITARTRATE AND ACETAMINOPHEN 5; 325 MG/1; MG/1
1 TABLET ORAL EVERY 6 HOURS PRN
Status: DISCONTINUED | OUTPATIENT
Start: 2023-11-20 | End: 2023-11-20 | Stop reason: HOSPADM

## 2023-11-20 RX ORDER — REGADENOSON 0.08 MG/ML
0.4 INJECTION, SOLUTION INTRAVENOUS ONCE
Status: COMPLETED | OUTPATIENT
Start: 2023-11-20 | End: 2023-11-20

## 2023-11-20 RX ORDER — ALBUTEROL SULFATE 0.83 MG/ML
2.5 SOLUTION RESPIRATORY (INHALATION) EVERY 4 HOURS PRN
Status: DISCONTINUED | OUTPATIENT
Start: 2023-11-20 | End: 2023-11-20 | Stop reason: HOSPADM

## 2023-11-20 RX ADMIN — APIXABAN 5 MG: 5 TABLET, FILM COATED ORAL at 11:11

## 2023-11-20 RX ADMIN — PANTOPRAZOLE SODIUM 20 MG: 20 TABLET, DELAYED RELEASE ORAL at 05:11

## 2023-11-20 RX ADMIN — POTASSIUM BICARBONATE 50 MEQ: 977.5 TABLET, EFFERVESCENT ORAL at 05:11

## 2023-11-20 RX ADMIN — ALBUTEROL SULFATE 2.5 MG: 2.5 SOLUTION RESPIRATORY (INHALATION) at 12:11

## 2023-11-20 RX ADMIN — REGADENOSON 0.4 MG: 0.08 INJECTION, SOLUTION INTRAVENOUS at 09:11

## 2023-11-20 RX ADMIN — ASPIRIN 81 MG: 81 TABLET, COATED ORAL at 11:11

## 2023-11-20 NOTE — ED PROVIDER NOTES
"Encounter Date: 11/19/2023       History     Chief Complaint   Patient presents with    Back Pain     +"a touch of pneumonia" a week ago. Hx thoracic aneurysm sternal pain      88-year-old female presented emergency department with upper and mid back pain and left-sided chest tightness and shortness of breath.  Denies fever or chills.  Denies nausea vomiting or dysuria or hematuria or abdominal pain or any focal weakness or numbness.  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  Patient could count my fingers.  Denies any  focal weakness at this time.  Patient has history of atrial fibrillation and is on Eliquis.  Symptoms started about  4 hours ago      Review of patient's allergies indicates:   Allergen Reactions    Morphine Anxiety     Past Medical History:   Diagnosis Date    Arthritis     Coronary artery disease     Diverticulitis     GERD (gastroesophageal reflux disease)     Hyperlipidemia     Hypertension      Past Surgical History:   Procedure Laterality Date    CARDIAC CATHETERIZATION      CARDIAC PACEMAKER PLACEMENT  2007    CARDIAC VALVE SURGERY      CHOLECYSTECTOMY      CORONARY ARTERY BYPASS GRAFT      HYSTERECTOMY      TOTAL KNEE ARTHROPLASTY Right     TOTAL KNEE ARTHROPLASTY Left      No family history on file.  Social History     Tobacco Use    Smoking status: Former    Smokeless tobacco: Never   Substance Use Topics    Alcohol use: No    Drug use: No     Review of Systems   Constitutional: Negative.    HENT: Negative.     Eyes: Negative.    Respiratory:  Positive for shortness of breath.    Cardiovascular:  Positive for chest pain.   Gastrointestinal: Negative.    Endocrine: Negative.    Genitourinary: Negative.    Musculoskeletal:  Positive for back pain.   Skin: Negative.    Allergic/Immunologic: Negative.    Neurological: Negative.    Hematological: Negative.    Psychiatric/Behavioral: Negative.     All other systems reviewed and are negative.      Physical Exam     Initial Vitals " [11/19/23 1752]   BP Pulse Resp Temp SpO2   (!) 84/53 93 20 97.2 °F (36.2 °C) 96 %      MAP       --         Physical Exam    Nursing note and vitals reviewed.  Constitutional: She appears well-developed and well-nourished.   HENT:   Head: Normocephalic and atraumatic.   Nose: Nose normal.   Mouth/Throat: Oropharynx is clear and moist.   Eyes: Conjunctivae and EOM are normal. Pupils are equal, round, and reactive to light.   Neck: Neck supple. No thyromegaly present. No tracheal deviation present. No JVD present.   Normal range of motion.  Cardiovascular:  Normal rate, regular rhythm, normal heart sounds and intact distal pulses.           No murmur heard.  Pulmonary/Chest: Breath sounds normal. No stridor. No respiratory distress. She has no wheezes. She has no rales.   Abdominal: Abdomen is soft. Bowel sounds are normal. She exhibits no distension. There is no abdominal tenderness.   Musculoskeletal:         General: No edema. Normal range of motion.      Cervical back: Normal range of motion and neck supple.     Neurological: She is alert and oriented to person, place, and time. She has normal strength. GCS score is 15. GCS eye subscore is 4. GCS verbal subscore is 5. GCS motor subscore is 6.   Skin: Skin is warm. Capillary refill takes less than 2 seconds.   Psychiatric: She has a normal mood and affect. Thought content normal.         ED Course   Critical Care    Date/Time: 11/19/2023 6:20 PM    Performed by: Oksana Langford MD  Authorized by: Oksana Langford MD  Direct patient critical care time: 20 minutes  Ordering / reviewing critical care time: 5 minutes  Documentation critical care time: 5 minutes  Total critical care time (exclusive of procedural time) : 30 minutes        Labs Reviewed   CBC W/ AUTO DIFFERENTIAL - Abnormal; Notable for the following components:       Result Value    RBC 3.91 (*)     Hematocrit 36.0 (*)     RDW 15.9 (*)     Lymph % 14.5 (*)     All other components within normal limits    COMPREHENSIVE METABOLIC PANEL - Abnormal; Notable for the following components:    Glucose 154 (*)     AST 42 (*)     eGFR 54.5 (*)     All other components within normal limits   TROPONIN I HIGH SENSITIVITY - Abnormal; Notable for the following components:    Troponin I High Sensitivity 17.9 (*)     All other components within normal limits   B-TYPE NATRIURETIC PEPTIDE - Abnormal; Notable for the following components:     (*)     All other components within normal limits   POCT GLUCOSE - Abnormal; Notable for the following components:    POC Glucose 149 (*)     All other components within normal limits   ISTAT CREATININE   POCT CREATININE     EKG Readings: (Independently Interpreted)   Initial Reading: No STEMI. Rhythm: Atrial Fibrillation. Ectopy: No Ectopy. Conduction: Normal. T Waves Flipped: V4, V5, V6, V3 and V2. Axis: Left Axis Deviation.   Prolonged QT.       Imaging Results              X-Ray Chest AP Portable (Final result)  Result time 11/19/23 19:20:44      Final result by Michael Courtney MD (11/19/23 19:20:44)                   Narrative:      EXAM: XR CHEST AP PORTABLE    HISTORY: CHF    COMPARISON:Chest x-ray dated 7/22/2021    FINDINGS: The lungs are well-expanded and appear free of infiltrates. No pleural effusions are evident. The heart is mildly moderately enlarged and unchanged. The pulmonary vasculature is within normal limits. A dual-chamber pacemaker is in place in the left subclavian vein. A prosthetic aortic valve is noted.    IMPRESSION:   No evidence of acute disease within the chest.    Electronically signed by:  Michael Courtney MD  11/19/2023 07:20 PM CST Workstation: [id number]                                     CTA Chest Abdomen Non Coronary (Final result)  Result time 11/19/23 19:19:47      Final result by Michael Courtney MD (11/19/23 19:19:47)                   Narrative:      EXAM: CTA CHEST ABDOMEN NON CORONARY (XPD)    HISTORY: Aortic dissection  suspected    COMPARISON: CTA of the chest dated May 5, 2020    TECHNIQUE: Multiple axial 1 mm thick images were obtained through the chest and abdomen during rapid IV injection of contrast. Multiple coronal and sagittal MIP reconstructions were produced.    This exam was performed according to our departmental dose-optimization program, which includes automated exposure control, adjustment of the mA and/or kV according to patient size and/or use of iterative reconstruction technique.    Unless otherwise stated, incidental findings do not require dedicated follow-up imaging.    FINDINGS: There is moderate dilatation of the ascending thoracic aorta that measures approximately 4.6 cm in diameter. This is not significantly changed since the preceding study. The descending thoracic aorta and abdominal aorta remain normal in caliber. There is no evidence of aortic dissection. There is normal branching of the great vessels from the aortic arch that all appear widely patent. The celiac artery is widely patent. 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 Moderate focal narrowing is also present at the origin of the right main renal artery. There is mild stenosis at the origin of the left main renal artery.    There are a few mildly enlarged mediastinal lymph nodes adjacent to the den that are unchanged and likely benign. There is no hilar or axillary lymphadenopathy. The heart is mildly enlarged.. Lung window images demonstrate no parenchymal infiltrates or masses. There are no significant pleural effusions.    The solid viscera were not optimally imaged due to the angiographic phase of postcontrast images. The liver and spleen and pancreas and adrenal glands and kidneys are unremarkable. The gallbladder is absent. There is no bile duct dilatation. The stomach and visualized small and large bowel are unremarkable. No bowel  distention is evident.    IMPRESSION:   Mildly dilated ascending thoracic aorta show no change since the preceding CTA of the chest dated 5/5/2020. The aorta is otherwise unremarkable. There is no evidence of aortic dissection. Otherwise unremarkable CTA of the chest and abdomen. No definite acute process is identified.    Electronically signed by:  Michael Courtney MD  11/19/2023 07:19 PM CST Workstation: [id number]                                     Medications   aspirin tablet 325 mg (has no administration in time range)   sodium chloride 0.9% bolus 1,000 mL 1,000 mL (has no administration in time range)   0.9%  NaCl infusion (1,000 mLs Intravenous New Bag 11/19/23 1930)     Medical Decision Making  88-year-old female presented emergency department with upper back pain and chest pain and shortness of breath.  Given patient's history of aortic aneurysm there was concerned about possible bleeding as patient was initially hypotensive.  Patient did improve with hydration and blood pressure stabilized.  Patient's chest pain and shortness of breath and back pain resolved while in the emergency department and currently pain-free.  Patient's blood pressure improved and stabilized.  Aspirin given.  Patient on blood thinners for atrial fibrillation.  CT of the chest did not show any evidence of aortic dissection.  Aneurism noted.  Hospital Medicine consulted for evaluation for admission and further management.  Troponin slightly elevated.  Patient not having active chest pain at this time after treatment.    Amount and/or Complexity of Data Reviewed  Labs: ordered. Decision-making details documented in ED Course.  Radiology: ordered. Decision-making details documented in ED Course.  ECG/medicine tests: ordered and independent interpretation performed. Decision-making details documented in ED Course.    Risk  OTC drugs.  Prescription drug management.                                   Clinical Impression:  Final  diagnoses:  [R06.02] Shortness of breath  [R07.9] Chest pain, unspecified type (Primary)  [M54.9] Back pain, unspecified back location, unspecified back pain laterality, unspecified chronicity          ED Disposition Condition    Admit Stable                Oksana Langford MD  11/19/23 1949

## 2023-11-20 NOTE — ASSESSMENT & PLAN NOTE
Maintain iv fluids for another 5-6 hrs  Hold all BP meds  Blood culture  Cortisol on AM and Orthostatic BP measurement in AM

## 2023-11-20 NOTE — PLAN OF CARE
FLAHERTY explained to patient- pt v/u and signed form. FLAHERTY uploaded into media manager.      11/20/23 5415   FLAHERTY Message   Medicare Outpatient and Observation Notification regarding financial responsibility Given to patient/caregiver;Explained to patient/caregiver;Signed/date by patient/caregiver   Date FLAHERTY was signed 11/20/23   Time FLAHERTY was signed [id number]

## 2023-11-20 NOTE — PLAN OF CARE
11/20/23 1250   Patient Assessment/Suction   Level of Consciousness (AVPU) alert   Respiratory Effort Mild;Labored   Expansion/Accessory Muscles/Retractions no use of accessory muscles;expansion symmetric   Rhythm/Pattern, Respiratory shortness of breath   PRE-TX-O2   Device (Oxygen Therapy) room air   SpO2 97 %   Pulse Oximetry Type Intermittent   $ Pulse Oximetry - Multiple Charge Pulse Oximetry - Multiple   Pulse 82   Resp 16   Aerosol Therapy   $ Aerosol Therapy Charges Aerosol Treatment   Daily Review of Necessity (SVN) completed   Respiratory Treatment Status (SVN) given   Treatment Route (SVN) mask;oxygen   Patient Position (SVN) HOB elevated   Post Treatment Assessment (SVN) breath sounds unchanged   Signs of Intolerance (SVN) none   Breath Sounds Post-Respiratory Treatment   Post-treatment Heart Rate (beats/min) 81   Post-treatment Resp Rate (breaths/min) 18   Education   $ Education Bronchodilator;15 min

## 2023-11-20 NOTE — NURSING
Nurses Note -- 4 Eyes      11/20/2023   2:24 AM      Skin assessed during: Admit      [x] No Altered Skin Integrity Present    []Prevention Measures Documented      [] Yes- Altered Skin Integrity Present or Discovered   [] LDA Added if Not in Epic (Describe Wound)   [] New Altered Skin Integrity was Present on Admit and Documented in LDA   [] Wound Image Taken    Wound Care Consulted? No    Attending Nurse:  Marylu Dominguez RN/Staff Member:   Aissatou

## 2023-11-20 NOTE — PLAN OF CARE
Critical access hospital  Initial Discharge Assessment       Primary Care Provider: Michela Christian MD    Admission Diagnosis: Chest pain, unspecified type [R07.9]    Admission Date: 11/19/2023  Expected Discharge Date: 11/20/2023    Discharge assessment completed with patient at bedside, information verified as correct on facesheet. Patient denied any CM needs. Discharge plan is home with family.     Payor: [employer] MANAGED MEDICARE / Plan: [employer] SNP HMO PPO SPECIAL NEEDS / Product Type: Medicare Advantage /     Extended Emergency Contact Information  Primary Emergency Contact: Sofie Chew  Address: [address] of [facility]  Home Phone: [phone]  Mobile Phone: [phone]  Relation: Daughter  Preferred language: English   needed? No  Secondary Emergency Contact: Corinne Rowell  Mobile Phone: [phone]  Relation: Daughter  Preferred language: English   needed? No    Discharge Plan A: Home with family  Discharge Plan B: Home      [facility] [place] [facility] [address] [facility]  [address] [facility] [place] [address]  Phone: [phone] Fax: [phone]    [facility] - [facility] [address] [facility]  [address] [facility] [address]  Phone: [phone] Fax: [phone]

## 2023-11-20 NOTE — DISCHARGE SUMMARY
[facility]  Discharge Summary      Patient Name: Milly Lee  MRN: [id number]  ADRIAN: [id number]  Patient Class: OP- Observation  Admission Date: 11/19/2023  Hospital Length of Stay: 0 days  Discharge Date and Time:  11/20/2023 2:57 PM  Attending Physician: Leobardo Diaz MD   Discharging Provider: Diane Shukla DNP  Primary Care Provider: Michela Christian MD    Primary Care Team: Networked reference to record PCT     HPI:   88 year old patient getting admitted with Chest pain and Hypotension  Patient was suffering from chest pain for past 6 weeks   Pain mostly on L side /on and off/radiation to back/No nausea,vomiting,diaphoresis  Pt went to [facility] ER 1 week ago and was prescribed abx for presumed pneumonia  She came here today because of persistent symptoms  In ER she was found to be hypotensive and her BP levels came up after fluid administration with resolution of her symptoms    Pt had CABG in 2009  Pt had LHC in Feb 2018 with no PCI  Pt had TAVR in March 2018  Pt had EGD with dilatation of Esophagus in August 2023  She last saw her Cardiologist :Dr Benitez in [facility] on August 2023      * No surgery found *      Hospital Course:   Patient is a 88 year old female c/o chest pain, back pain, and shortness of breath for six-weeks. She reports going to [facility] a week ago, where she was treated for pneumonia. However, her symptoms persistent. She was admitted to the hospital for chest pain work-up due to extensive cardiac history. Her history includes thoracic aneurysm, TAVR 2018, CABG, PM placement, Afib, TRISTEN, DM, arthritis, and fibromyalgia. Patient reports chest pain occurs with cough and believes she has pneumonia. Chest x-ray was negative. No leukocytosis and afebrile. Troponins with mild elevation but trended downward. Shortness of breath improved with nebs treatment. Her chest pain is pleuritic in nature in addition to chronic generalized  pain secondary to history of arthritis and fibromyalgia. Patient reports she follows Dr. Benitez, cardiologist closely outpatient. She states she has an aneurysm but unfortunately not a candidate for surgery due to age and medical history. CTA chest confirms unchanged mild dilated ascending thoracic aorta. Her perfusion portion of the stress test revealed no evidence of reversible ischemia or infarct, normal LV wall motion, and EF 67%. She remained asymptomatic during ECG portion of test. Patient was able to ambulate with physical therapy without any distress. Discharge plan discussed with patient and family, verbalized understanding. Instructed to follow-up with cardiologist in one week.     Order placed outpatient to follow-up with lactic acid and urine analysis due to critical result after discharge. Notified patient and daughter for outpatient lab work.      Goals of Care Treatment Preferences:  Code Status: Full Code      Consults:     No new Assessment & Plan notes have been filed under this hospital service since the last note was generated.  Service: Hospital Medicine    Final Active Diagnoses:    Diagnosis Date Noted POA    PRINCIPAL PROBLEM:  Chest pain [R07.9] 11/19/2023 Yes    Hypotension [I95.9] 11/19/2023 Yes    Longstanding persistent atrial fibrillation [I48.11] 10/19/2020 Yes    Chronic diastolic heart failure [I50.32] 03/19/2018 Yes    CAD (coronary artery disease) [I25.10]  Yes    Ascending aortic aneurysm [I71.21] 02/22/2018 Yes     Chronic    Cardiac pacemaker in situ [Z95.0] 02/21/2018 Yes    Type 2 diabetes mellitus with stage 3 chronic kidney disease, with long-term current use of insulin [E11.22, N18.30, Z79.4] 02/21/2018 Not Applicable    Aortic valve stenosis [I35.0] 02/20/2018 Yes      Problems Resolved During this Admission:       Discharged Condition: good    Disposition: Home or Self Care    Follow Up:   Follow-up Information       Michela Christian MD. Go on 11/27/2023.    Specialty:  Family Medicine  Why: follow up as previously scheduled at 4:30 PM  Contact information:  [address]  [address]  [place] [address]  [phone]               Valeriano Benitez MD. Go on 12/4/2023.    Specialties: Cardiology, Interventional Cardiology  Why: follow up as previously scheduled  Contact information:  [address]  [place] [address]  [phone]                           Patient Instructions:      Diet diabetic     Notify your health care provider if you experience any of the following:  temperature >100.4     Notify your health care provider if you experience any of the following:  persistent nausea and vomiting or diarrhea     Notify your health care provider if you experience any of the following:  severe uncontrolled pain     Notify your health care provider if you experience any of the following:  difficulty breathing or increased cough     Notify your health care provider if you experience any of the following:  worsening rash     Notify your health care provider if you experience any of the following:  severe persistent headache     Notify your health care provider if you experience any of the following:  persistent dizziness, light-headedness, or visual disturbances     Notify your health care provider if you experience any of the following:  increased confusion or weakness     Activity as tolerated       Significant Diagnostic Studies: Labs: BMP:   Recent Labs   Lab 11/19/23  1819 11/20/23  0545   * 156*    137   K 3.8 3.8    103   CO2 25 28   BUN 11 12   CREATININE 1.0 1.1   CALCIUM 9.7 9.3   MG  --  1.9   , CMP   Recent Labs   Lab 11/19/23  1819 11/20/23  0545    137   K 3.8 3.8    103   CO2 25 28   * 156*   BUN 11 12   CREATININE 1.0 1.1   CALCIUM 9.7 9.3   PROT 7.2 6.6   ALBUMIN 4.0 3.5   BILITOT 0.9 0.6   ALKPHOS 72 67   AST 42* 30   ALT 21 18   ANIONGAP 8 6*     CTA Chest Abdomen Non Coronary:     HISTORY: Aortic dissection  suspected     COMPARISON: CTA of the chest dated May 5, 2020     TECHNIQUE: Multiple axial 1 mm thick images were obtained through the chest and abdomen during rapid IV injection of contrast. Multiple coronal and sagittal MIP reconstructions were produced.     This exam was performed according to our departmental dose-optimization program, which includes automated exposure control, adjustment of the mA and/or kV according to patient size and/or use of iterative reconstruction technique.     Unless otherwise stated, incidental findings do not require dedicated follow-up imaging.     FINDINGS: There is moderate dilatation of the ascending thoracic aorta that measures approximately 4.6 cm in diameter. This is not significantly changed since the preceding study. The descending thoracic aorta and abdominal aorta remain normal in caliber. There is no evidence of aortic dissection. There is normal branching of the great vessels from the aortic arch that all appear widely patent. The celiac artery is widely patent. 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 Moderate focal narrowing is also present at the origin of the right main renal artery. There is mild stenosis at the origin of the left main renal artery.     There are a few mildly enlarged mediastinal lymph nodes adjacent to the den that are unchanged and likely benign. There is no hilar or axillary lymphadenopathy. The heart is mildly enlarged.. Lung window images demonstrate no parenchymal infiltrates or masses. There are no significant pleural effusions.     The solid viscera were not optimally imaged due to the angiographic phase of postcontrast images. The liver and spleen and pancreas and adrenal glands and kidneys are unremarkable. The gallbladder is absent. There is no bile duct dilatation. The stomach and visualized small and large bowel are unremarkable. No bowel  distention is evident.     IMPRESSION:   Mildly dilated ascending thoracic aorta show no change since the preceding CTA of the chest dated 5/5/2020. The aorta is otherwise unremarkable. There is no evidence of aortic dissection. Otherwise unremarkable CTA of the chest and abdomen. No definite acute process is identified.     Electronically signed by:  Michael Courtney MD  11/19/2023 07:19 PM CST Workstation: [id number]           Specimen Collected: 11/19/23 18:16 Last Resulted: 11/19/23 19:19           X-Ray Chest AP Portable:  EXAM: XR CHEST AP PORTABLE     HISTORY: CHF     COMPARISON:Chest x-ray dated 7/22/2021     FINDINGS: The lungs are well-expanded and appear free of infiltrates. No pleural effusions are evident. The heart is mildly moderately enlarged and unchanged. The pulmonary vasculature is within normal limits. A dual-chamber pacemaker is in place in the left subclavian vein. A prosthetic aortic valve is noted.     IMPRESSION:   No evidence of acute disease within the chest.     Electronically signed by:  Michael Courtney MD  11/19/2023 07:20 PM CST Workstation: [id number]             Specimen Collected: 11/19/23 18:20 Last Resulted: 11/19/23 19:20           NM Myocardial Perfusion Spect Multi Pharmacologic:    Narrative & Impression  HISTORY: Chest pain/anginal equiv, high CAD risk, not treadmill candidate     TECHNIQUE:  9.6 mCi technetium 99m Myoview was administered IV for the rest portion of the exam, with 25.5 mCi for the stress portion of the exam, following a 1 day protocol.  The patient was stressed with Lexiscan 0.4 mg IV, and achieved a maximum heart rate of 94 beats per minute.     FINDINGS:  No prior studies for comparison. There is normal and homogeneous radiotracer uptake by the left ventricular myocardium, with no photopenic defects to suggest pharmacologically induced reversible ischemia or infarct.  There are no wall motion abnormalities on the gated cine images, with no abnormal  transient left ventricular dilatation on stress images.     Ejection fraction is calculated at 67%.  The polar map images confirm the planar SPECT findings.     IMPRESSION:  1. No evidence of pharmacologically induced reversible ischemia or infarct.  2. Normal left ventricular wall motion.  3. Calculated ejection fraction of 67%.     Electronically signed by:  Kenn Guerrier MD  11/20/2023 11:02 AM CST Workstation: [id number]           Specimen Collected: 11/20/23 07:35 Last Resulted: 11/20/23 11:02           Stress test only, Regadenoson:    Reason for Exam  Priority: Routine  Dx: Chest pain [R07.9 (ICD-10-CM)]     Conclusion         The ECG portion of the study is uninterpretable due to left bundle branch block.    The patient reported no chest pain during the stress test.    During stress, occasional PVCs are noted.    The nuclear portion of this study will be reported separately.     Performing Clinician    Guerda Dean RN Reason for Exam  Priority: Routine  Dx: Chest pain [R07.9 (ICD-10-CM)]      Vitals    Height Weight BMI (Calculated) BSA (Calculated - sq m) BP Pulse             View Images Vital Vitrea     Show images for Nuclear Stress Test  Epiphany Scans -- Order Level:    Epiphany Scans: None found at the order level.      Stress Protocol    Stress Findings    The nuclear portion of this study will be reported separately.   The patient was infused intravenously with regadenoson at 0.08 mg/ml for a total duration of 10 seconds. A total regadenoson dose of 0.4 mg was injected. at 0904. The patient reported no symptoms during the stress test. The test was stopped because the end of the protocol was reached.     Baseline ECG    The Baseline ECG reveals atrial fibrillation and PVCs. The baseline ECG has ST and/or T wave abnormalities suggestive of myocardial ischemia. There is evidence of prior infarction in the anterior wall(s).     Stress/Recovery ECG    There are no ST segment deviation identified during  the protocol.  During stress, occasional PVCs are noted.    Intermittent rate-related left bundle branch block morphology noted. There is normal blood pressure response with stress.     ECG Conclusion    The ECG portion of the study is uninterpretable due to left bundle branch block.     Stress Vitals    Baseline Vitals   HR at rest 80 bpm         Systolic blood pressure 124 mmHg         Diastolic blood pressure 53 mmHg          Stress Vitals   Peak HR 98 bpm         Peak Systolic  mmHg         Peak Diatolic BP 67 mmHg          Heart Rate   85% Max Predicted          % Max HR Achieved 76         1 Minute Recovery HR 93 bpm         Max Predicted            EKG 12- Lead:    Narrative  Performed by: GEMUSE  Test Reason : R06.02,    Vent. Rate : 077 BPM     Atrial Rate : 048 BPM     P-R Int : 000 ms          QRS Dur : 110 ms      QT Int : 436 ms       P-R-T Axes : 000 -50 160 degrees     QTc Int : 493 ms    Atrial fibrillation with frequent ventricular-paced complexes  Left axis deviation  Voltage criteria for left ventricular hypertrophy  Marked ST abnormality, possible lateral subendocardial injury  Prolonged QT  Abnormal ECG  When compared with ECG of 22-JUL-2021 18:27,  Electronic ventricular pacemaker has replaced Atrial fibrillation    Referred By: [email or web address]   SELF           Confirmed By:      Specimen Collected: 11/19/23 18:05 Last Resulted: 11/20/23 05:31          Pending Diagnostic Studies:       Procedure Component Value Units Date/Time    Lactic acid, plasma [[id number]] Collected: 11/20/23 1332    Order Status: Sent Lab Status: In process Updated: 11/20/23 1352    Specimen: Blood     Narrative:      Collection has been rescheduled by LND at 11/20/2023 09:20 Reason: Pt   unavailable stress test.           Medications:  Reconciled Home Medications:      Medication List        CONTINUE taking these medications      AEROCHAMBER PLUS FLOW-VU  Generic drug: inhalation spacing device     AMITIZA 8  MCG Cap  Generic drug: lubiprostone  Take 8 mcg by mouth 2 (two) times daily with meals.     apixaban 5 mg Tab  Commonly known as: ELIQUIS  Take 1 tablet (5 mg total) by mouth 2 (two) times daily.     ARIPiprazole 10 MG Tab  Commonly known as: ABILIFY  Take 10 mg by mouth once daily.     ascorbic acid (vitamin C) 500 mg Cap  Take 500 mg by mouth once daily.     aspirin 81 MG EC tablet  Commonly known as: ECOTRIN  Take 81 mg by mouth once daily.     BAQSIMI 3 mg/actuation Spry  Generic drug: glucagon  1 Dose by Each Nostril route daily as needed.     budesonide 1 mg/2 mL Nbsp  Take 1 mg by nebulization every 4 to 6 hours as needed.     CONTOUR TEST STRIPS Strp  Generic drug: blood sugar diagnostic  1 strip by Misc.(Non-Drug; Combo Route) route 2 (two) times a day.     cyanocobalamin 500 MCG tablet  Take 1 tablet by mouth once daily.     diclofenac sodium 1 % Gel  Commonly known as: VOLTAREN  Apply 4 g topically 2 (two) times daily.     digoxin 125 mcg tablet  Commonly known as: LANOXIN  Take 1 tablet (125 mcg total) by mouth once daily.     docusate sodium 50 MG capsule  Commonly known as: COLACE  Take 100 mg by mouth 2 (two) times daily.     DULoxetine 30 MG capsule  Commonly known as: CYMBALTA  Take 30 mg by mouth once daily.     furosemide 40 MG tablet  Commonly known as: LASIX  Take 40 mg by mouth once daily.     hydrocortisone 2.5 % cream  Apply 1 application  topically as needed.     ibuprofen 200 MG tablet  Commonly known as: ADVIL,MOTRIN  Take 200 mg by mouth every 6 (six) hours as needed for Pain.     * insulin NPH-insulin regular (70/30) 100 unit/mL (70-30) injection  Inject 40 Units into the skin 2 (two) times daily before meals.     * insulin NPH-insulin regular (70/30) 100 unit/mL (70-30) injection  Inject 80 Units into the skin 2 (two) times daily before meals.     ipratropium-albuteroL  mcg/actuation inhaler  Commonly known as: CombiVENT  Inhale 1 puff into the lungs every 4 (four) hours as  "needed for Shortness of Breath or Wheezing.     levothyroxine 175 MCG tablet  Commonly known as: SYNTHROID, LEVOTHROID  Take 175 mcg by mouth once daily.     magnesium oxide 250 mg magnesium Tab  Commonly known as: MAG-OX  Take 400 mg by mouth once daily.     metoprolol succinate 100 MG 24 hr tablet  Commonly known as: TOPROL-XL  Take 1 tablet (100 mg total) by mouth once daily.     miconazole nitrate 200 mg Supp  Commonly known as: MONISTAT 3  Place 200 mg vaginally every evening.     nitroGLYCERIN 0.4 MG SL tablet  Commonly known as: NITROSTAT  Place 0.4 mg under the tongue every 5 (five) minutes as needed for Chest pain.     NovoLOG Mix 70-30FlexPen U-100 100 unit/mL (70-30) Inpn pen  Generic drug: insulin aspart protamine-insulin aspart  Inject into the skin 2 (two) times daily. 35 units if glucose <150  40 units if glucose >150     nystatin cream  Commonly known as: MYCOSTATIN  Apply 100,000 Units topically 2 (two) times daily.     omega-3 fatty acids-fish oil 340-1,000 mg Cap  Take 1 capsule by mouth once daily.     pantoprazole 20 MG tablet  Commonly known as: PROTONIX  Take 20 mg by mouth once daily.     potassium gluconate 2.5 mEq Tab  Take 1 tablet by mouth once daily.     rosuvastatin 20 MG tablet  Commonly known as: CRESTOR  Take 20 mg by mouth once daily.     tobramycin-dexAMETHasone 0.3-0.1% 0.3-0.1 % Drps  Commonly known as: TOBRADEX  Place 1 drop into both eyes every 6 (six) hours.     TRUE METRIX GLUCOSE METER Misc  Generic drug: blood-glucose meter     * TRUEPLUS LANCETS 28 gauge Misc  Generic drug: lancets  1 lancet by Misc.(Non-Drug; Combo Route) route 2 (two) times a day.     * TRUEPLUS LANCETS 33 gauge Misc  Generic drug: lancets  1 lancet by Misc.(Non-Drug; Combo Route) route 2 (two) times a day.     TRUEPLUS PEN NEEDLE 31 gauge x 1/4" Ndle  Generic drug: pen needle, diabetic  1 pen by Misc.(Non-Drug; Combo Route) route 2 (two) times a day.     VENTOLIN HFA 90 mcg/actuation inhaler  Generic " drug: albuterol  Inhale 1 puff into the lungs every 4 (four) hours as needed for Wheezing.           * This list has 4 medication(s) that are the same as other medications prescribed for you. Read the directions carefully, and ask your doctor or other care provider to review them with you.                STOP taking these medications      ALPRAZolam 0.25 MG tablet  Commonly known as: XANAX     benazepriL 10 MG tablet  Commonly known as: LOTENSIN     hydrOXYzine HCL 25 MG tablet  Commonly known as: ATARAX     metoprolol tartrate 100 MG tablet  Commonly known as: LOPRESSOR     mirtazapine 15 MG tablet  Commonly known as: REMERON     sotaloL 80 MG tablet  Commonly known as: BETAPACE     traZODone 100 MG tablet  Commonly known as: DESYREL            ASK your doctor about these medications      diltiaZEM 120 MG Cp24  Commonly known as: CARDIZEM CD  Take 1 capsule (120 mg total) by mouth once daily.     EScitalopram oxalate 20 MG tablet  Commonly known as: LEXAPRO  Take 0.5 tablets (10 mg total) by mouth once daily.              Indwelling Lines/Drains at time of discharge:   Lines/Drains/Airways       None                   Time spent on the discharge of patient: 31 minutes         Diane Shukla DNP  Department of Hospital Medicine  [facility]

## 2023-11-20 NOTE — NURSING
Patient discharged per orders, all instructions reviewed with patient and patient verbalized understanding.  IV discontinued x 1, Tele box discontinued x 1.  Patient transferred to wheelchair x 1, all belongings by patients side.  Patient's daughter by her side to transfer her home.

## 2023-11-20 NOTE — PLAN OF CARE
Discharge orders and chart reviewed. No other discharge needs noted at this time. Pt is clear for discharge from case management. Pt is discharging to home.    F/u added to AVS     11/20/23 1435   Final Note   Assessment Type Final Discharge Note   Anticipated Discharge Disposition Home   What phone number can be called within the next 1-3 days to see how you are doing after discharge? [phone]   Hospital Resources/Appts/Education Provided Appointments scheduled and added to AVS   Post-Acute Status   Discharge Delays (!) Procedure Scheduling (IR, OR, Labs, Echo, Cath, Echo, EEG)

## 2023-11-20 NOTE — HOSPITAL COURSE
Patient is a 88 year old female c/o chest pain, back pain, and shortness of breath for six-weeks. She reports going to [facility] a week ago, where she was treated for pneumonia. However, her symptoms persistent. She was admitted to the hospital for chest pain work-up due to extensive cardiac history. Her history includes thoracic aneurysm, TAVR 2018, CABG, PM placement, Afib, TRISTEN, DM, arthritis, and fibromyalgia. Patient reports chest pain occurs with cough and believes she has pneumonia. Chest x-ray was negative. No leukocytosis and afebrile. Troponins with mild elevation but trended downward. Shortness of breath improved with nebs treatment. Her chest pain is pleuritic in nature in addition to chronic generalized pain secondary to history of arthritis and fibromyalgia. Patient reports she follows Dr. Benitez, cardiologist closely outpatient. She states she has an aneurysm but unfortunately not a candidate for surgery due to age and medical history. CTA chest confirms unchanged mild dilated ascending thoracic aorta. Her perfusion portion of the stress test revealed no evidence of reversible ischemia or infarct, normal LV wall motion, and EF 67%. She remained asymptomatic during ECG portion of test. Patient was able to ambulate with physical therapy without any distress. Discharge plan discussed with patient and family, verbalized understanding. Instructed to follow-up with cardiologist in one week.

## 2023-11-20 NOTE — SUBJECTIVE & OBJECTIVE
Past Medical History:   Diagnosis Date    Arthritis     Coronary artery disease     Diverticulitis     GERD (gastroesophageal reflux disease)     Hyperlipidemia     Hypertension        Past Surgical History:   Procedure Laterality Date    CARDIAC CATHETERIZATION      CARDIAC PACEMAKER PLACEMENT  2007    CARDIAC VALVE SURGERY      CHOLECYSTECTOMY      CORONARY ARTERY BYPASS GRAFT      HYSTERECTOMY      TOTAL KNEE ARTHROPLASTY Right     TOTAL KNEE ARTHROPLASTY Left        Review of patient's allergies indicates:   Allergen Reactions    Morphine Anxiety       No current facility-administered medications on file prior to encounter.     Current Outpatient Medications on File Prior to Encounter   Medication Sig    AEROCHAMBER PLUS FLOW-VU     albuterol (VENTOLIN HFA) 90 mcg/actuation inhaler Inhale 1 puff into the lungs every 4 (four) hours as needed for Wheezing.     ALPRAZolam (XANAX) 0.25 MG tablet Take 0.25 mg by mouth once daily.     AMITIZA 8 mcg Cap Take 8 mcg by mouth 2 (two) times daily with meals.     apixaban (ELIQUIS) 5 mg Tab Take 1 tablet (5 mg total) by mouth 2 (two) times daily.    ARIPiprazole (ABILIFY) 10 MG Tab Take 10 mg by mouth.    ascorbic acid, vitamin C, 500 mg Cap Take 500 mg by mouth.    aspirin (ECOTRIN) 81 MG EC tablet Take 81 mg by mouth once daily.    BAQSIMI 3 mg/actuation Spry by Each Nostril route.    benazepril (LOTENSIN) 10 MG tablet Take 10 mg by mouth once daily.     budesonide 1 mg/2 mL NbSp as needed.    CALCIUM CARB/MAG OXIDE/CU/ZINC (CALCIUM-MAGNESIUM-COPPER-ZINC ORAL) Take 1 tablet by mouth once daily.    cinnamon bark (CINNAMON) 500 mg capsule Take 1,000 mg by mouth 2 (two) times daily.    CONTOUR TEST STRIPS Strp 1 strip by Misc.(Non-Drug; Combo Route) route 2 (two) times a day.     cyanocobalamin 500 MCG tablet Take 1 tablet by mouth once daily.     diclofenac sodium (VOLTAREN) 1 % Gel Apply topically.    digoxin (LANOXIN) 125 mcg tablet Take 1 tablet (125 mcg total) by mouth  once daily.    diltiaZEM (CARDIZEM CD) 120 MG Cp24 Take 1 capsule (120 mg total) by mouth once daily.    docusate sodium (COLACE) 50 MG capsule Take 100 mg by mouth 2 (two) times daily.     doxycycline (VIBRA-TABS) 100 MG tablet Take 100 mg by mouth 2 (two) times daily.    DULoxetine (CYMBALTA) 30 MG capsule Take 30 mg by mouth once daily.     escitalopram oxalate (LEXAPRO) 20 MG tablet Take 0.5 tablets (10 mg total) by mouth once daily.    fluorometholone 0.1% (FML) 0.1 % DrpS Place 1 drop into both eyes 3 (three) times daily.    fluticasone propionate (FLONASE) 50 mcg/actuation nasal spray 2 sprays by Nasal route.    furosemide (LASIX) 40 MG tablet Take 40 mg by mouth once daily.     hydrocortisone 2.5 % cream Apply topically as needed.    hydrOXYzine HCL (ATARAX) 25 MG tablet Take 25 mg by mouth 3 times daily as needed.    ibuprofen (ADVIL,MOTRIN) 200 MG tablet Take 200 mg by mouth every 6 (six) hours as needed for Pain.    insulin NPH-insulin regular, 70/30, (NOVOLIN 70/30) 100 unit/mL (70-30) injection Inject 40 Units into the skin 2 (two) times daily before meals.     insulin NPH-insulin regular, 70/30, 100 unit/mL (70-30) injection Inject 80 Units into the skin.    ipratropium-albuterol (COMBIVENT)  mcg/actuation inhaler Inhale into the lungs.    levothyroxine (SYNTHROID, LEVOTHROID) 175 MCG tablet Take 175 mcg by mouth once daily.     magnesium oxide (MAG-OX) 250 mg Tab Take 400 mg by mouth once daily.     magnesium oxide (MAG-OX) 400 mg (241.3 mg magnesium) tablet Take by mouth.    metoprolol succinate (TOPROL-XL) 100 MG 24 hr tablet Take 1 tablet (100 mg total) by mouth once daily. (Patient taking differently: Take 100 mg by mouth 2 (two) times daily.)    metoprolol tartrate (LOPRESSOR) 100 MG tablet 1 tablet with food Orally Twice a day    miconazole nitrate (MONISTAT 3) 200 mg Supp Place 200 mg vaginally.    mirtazapine (REMERON) 15 MG tablet Take 15 mg by mouth every evening.     nitroGLYCERIN  "(NITROSTAT) 0.4 MG SL tablet Place 0.4 mg under the tongue every 5 (five) minutes as needed for Chest pain.    NOVOLOG MIX 70-30 FLEXPEN 100 unit/mL (70-30) InPn pen Inject into the skin 2 (two) times daily. 35 units if glucose <150  40 units if glucose >150    nystatin (MYCOSTATIN) cream Apply 100,000 Units topically.    omega-3 acid ethyl esters (LOVAZA) 1 gram capsule Take 1 g by mouth.    omega-3 fatty acids-fish oil 340-1,000 mg Cap Take 1 capsule by mouth once daily.    ondansetron (ZOFRAN) 8 MG tablet Take 8 mg by mouth every 8 (eight) hours as needed for Nausea.     pantoprazole (PROTONIX) 20 MG tablet Take 20 mg by mouth once daily.     potassium gluconate 2.5 mEq Tab Take 1 tablet by mouth once daily.     ranitidine (ZANTAC) 300 MG capsule Take 300 mg by mouth nightly.    rosuvastatin (CRESTOR) 20 MG tablet Take 20 mg by mouth once daily.     simvastatin (ZOCOR) 20 MG tablet Take 20 mg by mouth every evening.     sotaloL (BETAPACE) 80 MG tablet Take 0.5 tablets (40 mg total) by mouth 2 (two) times daily.    traZODone (DESYREL) 100 MG tablet Take 50 mg by mouth every evening.     TRUE METRIX GLUCOSE METER Misc     TRUEPLUS LANCETS 28 gauge Misc 1 lancet by Misc.(Non-Drug; Combo Route) route 2 (two) times a day.     TRUEPLUS LANCETS 33 gauge Misc 1 lancet by Misc.(Non-Drug; Combo Route) route 2 (two) times a day.     TRUEPLUS PEN NEEDLE 31 gauge x 1/4" Ndle 1 pen by Misc.(Non-Drug; Combo Route) route 2 (two) times a day.      Family History       Problem Relation (Age of Onset)    Hypertension Mother          Tobacco Use    Smoking status: Former    Smokeless tobacco: Never   Substance and Sexual Activity    Alcohol use: No    Drug use: No    Sexual activity: Never     Review of Systems   Constitutional:  Negative for activity change and appetite change.   HENT:  Negative for congestion and dental problem.    Eyes:  Negative for discharge and itching.   Respiratory:  Negative for shortness of breath.  "   Cardiovascular:  Positive for chest pain.   Gastrointestinal:  Negative for abdominal distention and abdominal pain.   Endocrine: Negative for cold intolerance.   Genitourinary:  Negative for difficulty urinating and dysuria.   Musculoskeletal:  Negative for arthralgias and back pain.   Skin:  Negative for color change.   Neurological:  Negative for dizziness and facial asymmetry.   Hematological:  Negative for adenopathy.   Psychiatric/Behavioral:  Negative for agitation and behavioral problems.      Objective:     Vital Signs (Most Recent):  Temp: 97.7 °F (36.5 °C) (11/19/23 1930)  Pulse: 75 (11/19/23 2001)  Resp: (!) 26 (11/19/23 2001)  BP: (!) 116/55 (11/19/23 2001)  SpO2: (!) 92 % (11/19/23 2001) Vital Signs (24h Range):  Temp:  [97.2 °F (36.2 °C)-97.7 °F (36.5 °C)] 97.7 °F (36.5 °C)  Pulse:  [74-93] 75  Resp:  [14-26] 26  SpO2:  [89 %-98 %] 92 %  BP: ()/(51-92) 116/55     Weight: 82.7 kg (182 lb 5.1 oz)  Body mass index is 35.61 kg/m².     Physical Exam  Vitals and nursing note reviewed.   Constitutional:       General: She is not in acute distress.  HENT:      Head: Atraumatic.      Right Ear: External ear normal.      Left Ear: External ear normal.      Nose: Nose normal.      Mouth/Throat:      Mouth: Mucous membranes are moist.   Eyes:      Extraocular Movements: Extraocular movements intact.   Cardiovascular:      Rate and Rhythm: Normal rate.   Pulmonary:      Effort: Pulmonary effort is normal.   Musculoskeletal:         General: Normal range of motion.      Cervical back: Normal range of motion.   Skin:     General: Skin is warm.   Neurological:      Mental Status: She is alert and oriented to person, place, and time.   Psychiatric:         Behavior: Behavior normal.                Significant Labs: All pertinent labs within the past 24 hours have been reviewed.  CBC:   Recent Labs   Lab 11/19/23 1819   WBC 8.05   HGB 12.0   HCT 36.0*        CMP:   Recent Labs   Lab 11/19/23 1819   NA  136   K 3.8      CO2 25   *   BUN 11   CREATININE 1.0   CALCIUM 9.7   PROT 7.2   ALBUMIN 4.0   BILITOT 0.9   ALKPHOS 72   AST 42*   ALT 21   ANIONGAP 8       Significant Imaging: I have reviewed all pertinent imaging results/findings within the past 24 hours.

## 2023-11-20 NOTE — CARE UPDATE
Spoke with daughter Sofie via phone to notify patient of outpatient lab work needed. Verbalized understanding. Will follow-up for treatment and management based on results.

## 2023-11-20 NOTE — ASSESSMENT & PLAN NOTE
Pt had CABG in 2009  Pt had LHC in Feb 2018 with no PCI    Angiographic Results      Diagnostic:          Patient has a left dominant coronary artery.        The coronary vessels have luminal irregularities.        - Left Main Coronary Artery:              The LM has luminal irregularities. There is JORGE A 3 flow.      - Left Anterior Descending Artery:              The LAD has luminal irregularities. There is JORGE A 3 flow.      - Left Circumflex Artery:              The LCX has luminal irregularities. There is JORGE A 3 flow.      - SVG To RCA:              The SVG to RCA is patent. There is JORGE A 3 flow. This is a bypass to a non dominant vessel which has no lesion.      - Right Coronary Artery:              The RCA is normal.

## 2023-11-20 NOTE — H&P
"[facility] - Emergency Dept  Hospital Medicine  History & Physical    Patient Name: Milly Lee  MRN: [id number]  Patient Class: OP- Observation  Admission Date: 11/19/2023  Attending Physician: Brad Gabriel MD   Primary Care Provider: Michela Christian MD         Patient information was obtained from patient, past medical records, and ER records.     Subjective:     Principal Problem:Chest pain    Chief Complaint:   Chief Complaint   Patient presents with    Back Pain     +"a touch of pneumonia" a week ago. Hx thoracic aneurysm sternal pain         HPI: 88 year old patient getting admitted with Chest pain and Hypotension  Patient was suffering from chest pain for past 6 weeks   Pain mostly on L side /on and off/radiation to back/No nausea,vomiting,diaphoresis  Pt went to [facility] 1 week ago and was prescribed abx for presumed pneumonia  She came here today because of persistent symptoms  In ER she was found to be hypotensive and her BP levels came up after fluid administration with resolution of her symptoms    Pt had CABG in 2009  Pt had LHC in Feb 2018 with no PCI  Pt had TAVR in March 2018  Pt had EGD with dilatation of Esophagus in August 2023  She last saw her Cardiologist :Dr Benitez in [facility] on August 2023      Past Medical History:   Diagnosis Date    Arthritis     Coronary artery disease     Diverticulitis     GERD (gastroesophageal reflux disease)     Hyperlipidemia     Hypertension        Past Surgical History:   Procedure Laterality Date    CARDIAC CATHETERIZATION      CARDIAC PACEMAKER PLACEMENT  2007    CARDIAC VALVE SURGERY      CHOLECYSTECTOMY      CORONARY ARTERY BYPASS GRAFT      HYSTERECTOMY      TOTAL KNEE ARTHROPLASTY Right     TOTAL KNEE ARTHROPLASTY Left        Review of patient's allergies indicates:   Allergen Reactions    Morphine Anxiety       No current facility-administered medications on file prior to encounter.     Current Outpatient " Medications on File Prior to Encounter   Medication Sig    AEROCHAMBER PLUS FLOW-VU     albuterol (VENTOLIN HFA) 90 mcg/actuation inhaler Inhale 1 puff into the lungs every 4 (four) hours as needed for Wheezing.     ALPRAZolam (XANAX) 0.25 MG tablet Take 0.25 mg by mouth once daily.     AMITIZA 8 mcg Cap Take 8 mcg by mouth 2 (two) times daily with meals.     apixaban (ELIQUIS) 5 mg Tab Take 1 tablet (5 mg total) by mouth 2 (two) times daily.    ARIPiprazole (ABILIFY) 10 MG Tab Take 10 mg by mouth.    ascorbic acid, vitamin C, 500 mg Cap Take 500 mg by mouth.    aspirin (ECOTRIN) 81 MG EC tablet Take 81 mg by mouth once daily.    BAQSIMI 3 mg/actuation Spry by Each Nostril route.    benazepril (LOTENSIN) 10 MG tablet Take 10 mg by mouth once daily.     budesonide 1 mg/2 mL NbSp as needed.    CALCIUM CARB/MAG OXIDE/CU/ZINC (CALCIUM-MAGNESIUM-COPPER-ZINC ORAL) Take 1 tablet by mouth once daily.    cinnamon bark (CINNAMON) 500 mg capsule Take 1,000 mg by mouth 2 (two) times daily.    CONTOUR TEST STRIPS Strp 1 strip by Misc.(Non-Drug; Combo Route) route 2 (two) times a day.     cyanocobalamin 500 MCG tablet Take 1 tablet by mouth once daily.     diclofenac sodium (VOLTAREN) 1 % Gel Apply topically.    digoxin (LANOXIN) 125 mcg tablet Take 1 tablet (125 mcg total) by mouth once daily.    diltiaZEM (CARDIZEM CD) 120 MG Cp24 Take 1 capsule (120 mg total) by mouth once daily.    docusate sodium (COLACE) 50 MG capsule Take 100 mg by mouth 2 (two) times daily.     doxycycline (VIBRA-TABS) 100 MG tablet Take 100 mg by mouth 2 (two) times daily.    DULoxetine (CYMBALTA) 30 MG capsule Take 30 mg by mouth once daily.     escitalopram oxalate (LEXAPRO) 20 MG tablet Take 0.5 tablets (10 mg total) by mouth once daily.    fluorometholone 0.1% (FML) 0.1 % DrpS Place 1 drop into both eyes 3 (three) times daily.    fluticasone propionate (FLONASE) 50 mcg/actuation nasal spray 2 sprays by Nasal route.    furosemide (LASIX) 40 MG tablet  Take 40 mg by mouth once daily.     hydrocortisone 2.5 % cream Apply topically as needed.    hydrOXYzine HCL (ATARAX) 25 MG tablet Take 25 mg by mouth 3 times daily as needed.    ibuprofen (ADVIL,MOTRIN) 200 MG tablet Take 200 mg by mouth every 6 (six) hours as needed for Pain.    insulin NPH-insulin regular, 70/30, (NOVOLIN 70/30) 100 unit/mL (70-30) injection Inject 40 Units into the skin 2 (two) times daily before meals.     insulin NPH-insulin regular, 70/30, 100 unit/mL (70-30) injection Inject 80 Units into the skin.    ipratropium-albuterol (COMBIVENT)  mcg/actuation inhaler Inhale into the lungs.    levothyroxine (SYNTHROID, LEVOTHROID) 175 MCG tablet Take 175 mcg by mouth once daily.     magnesium oxide (MAG-OX) 250 mg Tab Take 400 mg by mouth once daily.     magnesium oxide (MAG-OX) 400 mg (241.3 mg magnesium) tablet Take by mouth.    metoprolol succinate (TOPROL-XL) 100 MG 24 hr tablet Take 1 tablet (100 mg total) by mouth once daily. (Patient taking differently: Take 100 mg by mouth 2 (two) times daily.)    metoprolol tartrate (LOPRESSOR) 100 MG tablet 1 tablet with food Orally Twice a day    miconazole nitrate (MONISTAT 3) 200 mg Supp Place 200 mg vaginally.    mirtazapine (REMERON) 15 MG tablet Take 15 mg by mouth every evening.     nitroGLYCERIN (NITROSTAT) 0.4 MG SL tablet Place 0.4 mg under the tongue every 5 (five) minutes as needed for Chest pain.    NOVOLOG MIX 70-30 FLEXPEN 100 unit/mL (70-30) InPn pen Inject into the skin 2 (two) times daily. 35 units if glucose <150  40 units if glucose >150    nystatin (MYCOSTATIN) cream Apply 100,000 Units topically.    omega-3 acid ethyl esters (LOVAZA) 1 gram capsule Take 1 g by mouth.    omega-3 fatty acids-fish oil 340-1,000 mg Cap Take 1 capsule by mouth once daily.    ondansetron (ZOFRAN) 8 MG tablet Take 8 mg by mouth every 8 (eight) hours as needed for Nausea.     pantoprazole (PROTONIX) 20 MG tablet Take 20 mg by mouth once daily.      "potassium gluconate 2.5 mEq Tab Take 1 tablet by mouth once daily.     ranitidine (ZANTAC) 300 MG capsule Take 300 mg by mouth nightly.    rosuvastatin (CRESTOR) 20 MG tablet Take 20 mg by mouth once daily.     simvastatin (ZOCOR) 20 MG tablet Take 20 mg by mouth every evening.     sotaloL (BETAPACE) 80 MG tablet Take 0.5 tablets (40 mg total) by mouth 2 (two) times daily.    traZODone (DESYREL) 100 MG tablet Take 50 mg by mouth every evening.     TRUE METRIX GLUCOSE METER Misc     TRUEPLUS LANCETS 28 gauge Misc 1 lancet by Misc.(Non-Drug; Combo Route) route 2 (two) times a day.     TRUEPLUS LANCETS 33 gauge Misc 1 lancet by Misc.(Non-Drug; Combo Route) route 2 (two) times a day.     TRUEPLUS PEN NEEDLE 31 gauge x 1/4" Ndle 1 pen by Misc.(Non-Drug; Combo Route) route 2 (two) times a day.      Family History       Problem Relation (Age of Onset)    Hypertension Mother          Tobacco Use    Smoking status: Former    Smokeless tobacco: Never   Substance and Sexual Activity    Alcohol use: No    Drug use: No    Sexual activity: Never     Review of Systems   Constitutional:  Negative for activity change and appetite change.   HENT:  Negative for congestion and dental problem.    Eyes:  Negative for discharge and itching.   Respiratory:  Negative for shortness of breath.    Cardiovascular:  Positive for chest pain.   Gastrointestinal:  Negative for abdominal distention and abdominal pain.   Endocrine: Negative for cold intolerance.   Genitourinary:  Negative for difficulty urinating and dysuria.   Musculoskeletal:  Negative for arthralgias and back pain.   Skin:  Negative for color change.   Neurological:  Negative for dizziness and facial asymmetry.   Hematological:  Negative for adenopathy.   Psychiatric/Behavioral:  Negative for agitation and behavioral problems.      Objective:     Vital Signs (Most Recent):  Temp: 97.7 °F (36.5 °C) (11/19/23 1930)  Pulse: 75 (11/19/23 2001)  Resp: (!) 26 (11/19/23 2001)  BP: (!) " 116/55 (11/19/23 2001)  SpO2: (!) 92 % (11/19/23 2001) Vital Signs (24h Range):  Temp:  [97.2 °F (36.2 °C)-97.7 °F (36.5 °C)] 97.7 °F (36.5 °C)  Pulse:  [74-93] 75  Resp:  [14-26] 26  SpO2:  [89 %-98 %] 92 %  BP: ()/(51-92) 116/55     Weight: 82.7 kg (182 lb 5.1 oz)  Body mass index is 35.61 kg/m².     Physical Exam  Vitals and nursing note reviewed.   Constitutional:       General: She is not in acute distress.  HENT:      Head: Atraumatic.      Right Ear: External ear normal.      Left Ear: External ear normal.      Nose: Nose normal.      Mouth/Throat:      Mouth: Mucous membranes are moist.   Eyes:      Extraocular Movements: Extraocular movements intact.   Cardiovascular:      Rate and Rhythm: Normal rate.   Pulmonary:      Effort: Pulmonary effort is normal.   Musculoskeletal:         General: Normal range of motion.      Cervical back: Normal range of motion.   Skin:     General: Skin is warm.   Neurological:      Mental Status: She is alert and oriented to person, place, and time.   Psychiatric:         Behavior: Behavior normal.                Significant Labs: All pertinent labs within the past 24 hours have been reviewed.  CBC:   Recent Labs   Lab 11/19/23  1819   WBC 8.05   HGB 12.0   HCT 36.0*        CMP:   Recent Labs   Lab 11/19/23  1819      K 3.8      CO2 25   *   BUN 11   CREATININE 1.0   CALCIUM 9.7   PROT 7.2   ALBUMIN 4.0   BILITOT 0.9   ALKPHOS 72   AST 42*   ALT 21   ANIONGAP 8       Significant Imaging: I have reviewed all pertinent imaging results/findings within the past 24 hours.      Assessment/Plan:     * Chest pain  88 yr old diabetic pt getting admitted with chest pain  Serial cardiac enzymes  Nuclear Stress test in AM  If Stable can go home and follow up with Regular Cardiologist soon  If stress  test is abnormal need cardiology consultation from here  Another concern is polypharmacy and associated hypotension in a CAD pt  Hold All BP meds at the moment  and need correct med reconciliation of her home medication regime asap      Hypotension  Maintain iv fluids for another 5-6 hrs  Hold all BP meds  Blood culture  Cortisol on AM and Orthostatic BP measurement in AM       Longstanding persistent atrial fibrillation  Stable chronic issue    Chronic diastolic heart failure  Stable issue  On diuretics at home  Hold this at the moment in view with hypotension      CAD (coronary artery disease)  Pt had CABG in 2009  Pt had LHC in Feb 2018 with no PCI    Angiographic Results      Diagnostic:          Patient has a left dominant coronary artery.        The coronary vessels have luminal irregularities.        - Left Main Coronary Artery:              The LM has luminal irregularities. There is JORGE A 3 flow.      - Left Anterior Descending Artery:              The LAD has luminal irregularities. There is JORGE A 3 flow.      - Left Circumflex Artery:              The LCX has luminal irregularities. There is JORGE A 3 flow.      - SVG To RCA:              The SVG to RCA is patent. There is JORGE A 3 flow. This is a bypass to a non dominant vessel which has no lesion.      - Right Coronary Artery:              The RCA is normal.     Ascending aortic aneurysm  H/o aortic aneurysm which is a chronic issue  CTA done in ER today r/o any acute pathology       Type 2 diabetes mellitus with stage 3 chronic kidney disease, with long-term current use of insulin  Maintain NPH insulin BID along with SSI    Cardiac pacemaker in situ  Stable  Aware       Aortic valve stenosis  Had TAVR in 2018        VTE Risk Mitigation (From admission, onward)           Ordered     apixaban tablet 5 mg  2 times daily         11/19/23 2019     IP VTE HIGH RISK PATIENT  Once         11/19/23 2019     Place sequential compression device  Until discontinued         11/19/23 2019                       On 11/19/2023, patient should be placed in hospital observation services under my care.             Brad Gabriel,  MD  Department of Hospital Medicine  [facility] - Emergency Dept

## 2023-11-20 NOTE — PHARMACY MED REC
"              .        Admission Medication History     The home medication history was taken by Minerva Herring.    You may go to "Admission" then "Reconcile Home Medications" tabs to review and/or act upon these items.     The home medication list has been updated by the Pharmacy department.   Please read ALL comments highlighted in yellow.   Please address this information as you see fit.    Feel free to contact us if you have any questions or require assistance.      The medications listed below were removed from the home medication list. Please reorder if appropriate:  Patient reports no longer taking the following medication(s):  Zantac  Zithromax  Multivitamin  Cinnamon Bark  Doxycycline  FML Eye Drops  Flonase    Medications listed below were obtained from: Patient/family and Analytic software- [employer]  No current facility-administered medications on file prior to encounter.     Current Outpatient Medications on File Prior to Encounter   Medication Sig Dispense Refill    albuterol (VENTOLIN HFA) 90 mcg/actuation inhaler Inhale 1 puff into the lungs every 4 (four) hours as needed for Wheezing.       AMITIZA 8 mcg Cap Take 8 mcg by mouth 2 (two) times daily with meals.       apixaban (ELIQUIS) 5 mg Tab Take 1 tablet (5 mg total) by mouth 2 (two) times daily. 180 tablet 3    ascorbic acid, vitamin C, 500 mg Cap Take 500 mg by mouth once daily.      BAQSIMI 3 mg/actuation Spry 1 Dose by Each Nostril route daily as needed.      budesonide 1 mg/2 mL NbSp Take 1 mg by nebulization every 4 to 6 hours as needed.      cyanocobalamin 500 MCG tablet Take 1 tablet by mouth once daily.       diclofenac sodium (VOLTAREN) 1 % Gel Apply 4 g topically 2 (two) times daily.      digoxin (LANOXIN) 125 mcg tablet Take 1 tablet (125 mcg total) by mouth once daily. 90 tablet 3    docusate sodium (COLACE) 50 MG capsule Take 100 mg by mouth 2 (two) times daily.       DULoxetine (CYMBALTA) 30 MG capsule Take 30 mg by mouth once " daily.       furosemide (LASIX) 40 MG tablet Take 40 mg by mouth once daily.       hydrocortisone 2.5 % cream Apply 1 application  topically as needed.      ibuprofen (ADVIL,MOTRIN) 200 MG tablet Take 200 mg by mouth every 6 (six) hours as needed for Pain.      insulin NPH-insulin regular, 70/30, 100 unit/mL (70-30) injection Inject 80 Units into the skin 2 (two) times daily before meals.      levothyroxine (SYNTHROID, LEVOTHROID) 175 MCG tablet Take 175 mcg by mouth once daily.       magnesium oxide (MAG-OX) 250 mg Tab Take 400 mg by mouth once daily.       metoprolol succinate (TOPROL-XL) 100 MG 24 hr tablet Take 1 tablet (100 mg total) by mouth once daily. (Patient taking differently: Take 100 mg by mouth 2 (two) times daily.)      miconazole nitrate (MONISTAT 3) 200 mg Supp Place 200 mg vaginally every evening.      nitroGLYCERIN (NITROSTAT) 0.4 MG SL tablet Place 0.4 mg under the tongue every 5 (five) minutes as needed for Chest pain.      nystatin (MYCOSTATIN) cream Apply 100,000 Units topically 2 (two) times daily.      omega-3 fatty acids-fish oil 340-1,000 mg Cap Take 1 capsule by mouth once daily. 90 capsule 3    pantoprazole (PROTONIX) 20 MG tablet Take 20 mg by mouth once daily.       potassium gluconate 2.5 mEq Tab Take 1 tablet by mouth once daily.       rosuvastatin (CRESTOR) 20 MG tablet Take 20 mg by mouth once daily.       tobramycin-dexAMETHasone 0.3-0.1% (TOBRADEX) 0.3-0.1 % DrpS Place 1 drop into both eyes every 6 (six) hours.      AEROCHAMBER PLUS FLOW-VU       ALPRAZolam (XANAX) 0.25 MG tablet Take 0.25 mg by mouth once daily.       ARIPiprazole (ABILIFY) 10 MG Tab Take 10 mg by mouth once daily.      aspirin (ECOTRIN) 81 MG EC tablet Take 81 mg by mouth once daily.      benazepril (LOTENSIN) 10 MG tablet Take 10 mg by mouth once daily.       CONTOUR TEST STRIPS Strp 1 strip by Misc.(Non-Drug; Combo Route) route 2 (two) times a day.   1    diltiaZEM (CARDIZEM CD) 120 MG Cp24 Take 1 capsule  "(120 mg total) by mouth once daily. (Patient not taking: Reported on 11/19/2023) 90 capsule 1    escitalopram oxalate (LEXAPRO) 20 MG tablet Take 0.5 tablets (10 mg total) by mouth once daily. (Patient not taking: Reported on 11/19/2023)      hydrOXYzine HCL (ATARAX) 25 MG tablet Take 25 mg by mouth 3 times daily as needed for Itching or Anxiety.      insulin NPH-insulin regular, 70/30, (NOVOLIN 70/30) 100 unit/mL (70-30) injection Inject 40 Units into the skin 2 (two) times daily before meals.       ipratropium-albuterol (COMBIVENT)  mcg/actuation inhaler Inhale 1 puff into the lungs every 4 (four) hours as needed for Shortness of Breath or Wheezing.      metoprolol tartrate (LOPRESSOR) 100 MG tablet Take 100 mg by mouth 2 (two) times daily.      mirtazapine (REMERON) 15 MG tablet Take 15 mg by mouth every evening.       NOVOLOG MIX 70-30 FLEXPEN 100 unit/mL (70-30) InPn pen Inject into the skin 2 (two) times daily. 35 units if glucose <150  40 units if glucose >150      sotaloL (BETAPACE) 80 MG tablet Take 0.5 tablets (40 mg total) by mouth 2 (two) times daily. (Patient not taking: Reported on 11/19/2023) 45 tablet 3    traZODone (DESYREL) 100 MG tablet Take 50 mg by mouth every evening.       TRUE METRIX GLUCOSE METER Misc       TRUEPLUS LANCETS 28 gauge Misc 1 lancet by Misc.(Non-Drug; Combo Route) route 2 (two) times a day.       TRUEPLUS LANCETS 33 gauge Misc 1 lancet by Misc.(Non-Drug; Combo Route) route 2 (two) times a day.       TRUEPLUS PEN NEEDLE 31 gauge x 1/4" Ndle 1 pen by Misc.(Non-Drug; Combo Route) route 2 (two) times a day.       [DISCONTINUED] azithromycin (ZITHROMAX) 500 MG tablet Take 500 mg by mouth once daily.      [DISCONTINUED] CALCIUM CARB/MAG OXIDE/CU/ZINC (CALCIUM-MAGNESIUM-COPPER-ZINC ORAL) Take 1 tablet by mouth once daily.      [DISCONTINUED] cinnamon bark (CINNAMON) 500 mg capsule Take 1,000 mg by mouth 2 (two) times daily.      [DISCONTINUED] doxycycline (VIBRA-TABS) 100 MG " tablet Take 100 mg by mouth 2 (two) times daily.      [DISCONTINUED] fluorometholone 0.1% (FML) 0.1 % DrpS Place 1 drop into both eyes 3 (three) times daily.      [DISCONTINUED] fluticasone propionate (FLONASE) 50 mcg/actuation nasal spray 2 sprays by Nasal route.      [DISCONTINUED] magnesium oxide (MAG-OX) 400 mg (241.3 mg magnesium) tablet Take by mouth.      [DISCONTINUED] omega-3 acid ethyl esters (LOVAZA) 1 gram capsule Take 1 g by mouth once daily.      [DISCONTINUED] ondansetron (ZOFRAN) 8 MG tablet Take 8 mg by mouth every 8 (eight) hours as needed for Nausea.       [DISCONTINUED] ranitidine (ZANTAC) 300 MG capsule Take 300 mg by mouth nightly.      [DISCONTINUED] simvastatin (ZOCOR) 20 MG tablet Take 20 mg by mouth every evening.          Potential issues to be addressed PRIOR TO DISCHARGE  Patient reported not taking the following medications: (Mirtazapine, Combivent, Xanax, Abilify, Aspirin, Benazepril, Diltiazem, Lexapro, Hydroxyzine, Metoprolol, Sotalol & Trazodone). These medications remain on the home medication list. Please address accordingly.     Minerva Herring  EXT [id number]

## 2023-11-20 NOTE — HPI
88 year old patient getting admitted with Chest pain and Hypotension  Patient was suffering from chest pain for past 6 weeks   Pain mostly on L side /on and off/radiation to back/No nausea,vomiting,diaphoresis  Pt went to [place] [facility] 1 week ago and was prescribed abx for presumed pneumonia  She came here today because of persistent symptoms  In ER she was found to be hypotensive and her BP levels came up after fluid administration with resolution of her symptoms    Pt had CABG in 2009  Pt had LHC in Feb 2018 with no PCI  Pt had TAVR in March 2018  Pt had EGD with dilatation of Esophagus in August 2023  She last saw her Cardiologist :Dr Benitez in [facility] on August 2023

## 2023-11-20 NOTE — ASSESSMENT & PLAN NOTE
88 yr old diabetic pt getting admitted with chest pain  Serial cardiac enzymes  Nuclear Stress test in AM  If Stable can go home and follow up with Regular Cardiologist soon  If stress  test is abnormal need cardiology consultation from here  Another concern is polypharmacy and associated hypotension in a CAD pt  Hold All BP meds at the moment and need correct med reconciliation of her home medication regime asap

## 2023-11-25 LAB — BACTERIA BLD CULT: NORMAL

## 2024-03-02 ENCOUNTER — HOSPITAL ENCOUNTER (INPATIENT)
Facility: HOSPITAL | Age: 89
LOS: 4 days | Discharge: HOME HEALTH | DRG: 291 | End: 2024-03-06
Attending: EMERGENCY MEDICINE | Admitting: INTERNAL MEDICINE
Payer: MEDICARE

## 2024-03-02 DIAGNOSIS — R06.00 DYSPNEA: ICD-10-CM

## 2024-03-02 DIAGNOSIS — E11.22 TYPE 2 DIABETES MELLITUS WITH STAGE 3A CHRONIC KIDNEY DISEASE, WITH LONG-TERM CURRENT USE OF INSULIN: ICD-10-CM

## 2024-03-02 DIAGNOSIS — I50.9 CHF EXACERBATION: ICD-10-CM

## 2024-03-02 DIAGNOSIS — I50.32 CHRONIC DIASTOLIC HEART FAILURE: ICD-10-CM

## 2024-03-02 DIAGNOSIS — R07.9 CHEST PAIN: ICD-10-CM

## 2024-03-02 DIAGNOSIS — G47.33 OSA (OBSTRUCTIVE SLEEP APNEA): ICD-10-CM

## 2024-03-02 DIAGNOSIS — Z79.4 TYPE 2 DIABETES MELLITUS WITH STAGE 3A CHRONIC KIDNEY DISEASE, WITH LONG-TERM CURRENT USE OF INSULIN: ICD-10-CM

## 2024-03-02 DIAGNOSIS — J96.01 ACUTE HYPOXEMIC RESPIRATORY FAILURE: ICD-10-CM

## 2024-03-02 DIAGNOSIS — J18.9 PNEUMONIA OF BOTH LUNGS DUE TO INFECTIOUS ORGANISM, UNSPECIFIED PART OF LUNG: ICD-10-CM

## 2024-03-02 DIAGNOSIS — R09.02 HYPOXEMIA: ICD-10-CM

## 2024-03-02 DIAGNOSIS — I50.33 ACUTE ON CHRONIC DIASTOLIC (CONGESTIVE) HEART FAILURE: Primary | ICD-10-CM

## 2024-03-02 DIAGNOSIS — N18.31 TYPE 2 DIABETES MELLITUS WITH STAGE 3A CHRONIC KIDNEY DISEASE, WITH LONG-TERM CURRENT USE OF INSULIN: ICD-10-CM

## 2024-03-02 LAB
ALBUMIN SERPL BCP-MCNC: 3.9 G/DL (ref 3.5–5.2)
ALLENS TEST: ABNORMAL
ALP SERPL-CCNC: 71 U/L (ref 55–135)
ALT SERPL W/O P-5'-P-CCNC: 14 U/L (ref 10–44)
ANION GAP SERPL CALC-SCNC: 8 MMOL/L (ref 8–16)
AST SERPL-CCNC: 24 U/L (ref 10–40)
BASOPHILS # BLD AUTO: 0.11 K/UL (ref 0–0.2)
BASOPHILS NFR BLD: 1.2 % (ref 0–1.9)
BILIRUB SERPL-MCNC: 1 MG/DL (ref 0.1–1)
BILIRUB UR QL STRIP: NEGATIVE
BNP SERPL-MCNC: 379 PG/ML (ref 0–99)
BUN SERPL-MCNC: 12 MG/DL (ref 8–23)
CALCIUM SERPL-MCNC: 9.5 MG/DL (ref 8.7–10.5)
CHLORIDE SERPL-SCNC: 102 MMOL/L (ref 95–110)
CK SERPL-CCNC: <10 U/L (ref 20–180)
CLARITY UR: CLEAR
CO2 SERPL-SCNC: 26 MMOL/L (ref 23–29)
COLOR UR: YELLOW
CREAT SERPL-MCNC: 1.1 MG/DL (ref 0.5–1.4)
DELSYS: ABNORMAL
DIFFERENTIAL METHOD BLD: ABNORMAL
DIGOXIN SERPL-MCNC: 1.1 NG/ML (ref 0.8–2)
EOSINOPHIL # BLD AUTO: 0.1 K/UL (ref 0–0.5)
EOSINOPHIL NFR BLD: 1 % (ref 0–8)
ERYTHROCYTE [DISTWIDTH] IN BLOOD BY AUTOMATED COUNT: 16.9 % (ref 11.5–14.5)
EST. GFR  (NO RACE VARIABLE): 48.6 ML/MIN/1.73 M^2
FLOW: 13
GLUCOSE SERPL-MCNC: 105 MG/DL (ref 70–110)
GLUCOSE SERPL-MCNC: 173 MG/DL (ref 70–110)
GLUCOSE SERPL-MCNC: 239 MG/DL (ref 70–110)
GLUCOSE SERPL-MCNC: 241 MG/DL (ref 70–110)
GLUCOSE UR QL STRIP: NEGATIVE
HCO3 UR-SCNC: 21.9 MMOL/L (ref 24–28)
HCT VFR BLD AUTO: 38.1 % (ref 37–48.5)
HCT VFR BLD CALC: 36 %PCV (ref 36–54)
HGB BLD-MCNC: 12.3 G/DL (ref 12–16)
HGB UR QL STRIP: NEGATIVE
IMM GRANULOCYTES # BLD AUTO: 0.04 K/UL (ref 0–0.04)
IMM GRANULOCYTES NFR BLD AUTO: 0.4 % (ref 0–0.5)
INFLUENZA A, MOLECULAR: NEGATIVE
INFLUENZA B, MOLECULAR: NEGATIVE
INR PPP: 1.1 (ref 0.8–1.2)
KETONES UR QL STRIP: NEGATIVE
LACTATE SERPL-SCNC: 2.4 MMOL/L (ref 0.5–1.9)
LACTATE SERPL-SCNC: 2.9 MMOL/L (ref 0.5–1.9)
LACTATE SERPL-SCNC: 3.7 MMOL/L (ref 0.5–1.9)
LEUKOCYTE ESTERASE UR QL STRIP: NEGATIVE
LYMPHOCYTES # BLD AUTO: 1.6 K/UL (ref 1–4.8)
LYMPHOCYTES NFR BLD: 17.4 % (ref 18–48)
MAGNESIUM SERPL-MCNC: 1.9 MG/DL (ref 1.6–2.6)
MCH RBC QN AUTO: 31 PG (ref 27–31)
MCHC RBC AUTO-ENTMCNC: 32.3 G/DL (ref 32–36)
MCV RBC AUTO: 96 FL (ref 82–98)
MODE: ABNORMAL
MONOCYTES # BLD AUTO: 0.9 K/UL (ref 0.3–1)
MONOCYTES NFR BLD: 10.2 % (ref 4–15)
NEUTROPHILS # BLD AUTO: 6.3 K/UL (ref 1.8–7.7)
NEUTROPHILS NFR BLD: 69.8 % (ref 38–73)
NITRITE UR QL STRIP: NEGATIVE
NRBC BLD-RTO: 0 /100 WBC
PCO2 BLDA: 32.9 MMHG (ref 35–45)
PH SMN: 7.43 [PH] (ref 7.35–7.45)
PH UR STRIP: 7 [PH] (ref 5–8)
PLATELET # BLD AUTO: 183 K/UL (ref 150–450)
PMV BLD AUTO: 11.2 FL (ref 9.2–12.9)
PO2 BLDA: 58 MMHG (ref 80–100)
POC BE: -2 MMOL/L
POC IONIZED CALCIUM: 1.19 MMOL/L (ref 1.06–1.42)
POC SATURATED O2: 91 % (ref 95–100)
POC TCO2: 23 MMOL/L (ref 23–27)
POTASSIUM BLD-SCNC: 4.6 MMOL/L (ref 3.5–5.1)
POTASSIUM SERPL-SCNC: 4.5 MMOL/L (ref 3.5–5.1)
PROCALCITONIN SERPL IA-MCNC: 0.05 NG/ML (ref 0–0.5)
PROT SERPL-MCNC: 7.3 G/DL (ref 6–8.4)
PROT UR QL STRIP: NEGATIVE
PROTHROMBIN TIME: 12.2 SEC (ref 9–12.5)
RBC # BLD AUTO: 3.97 M/UL (ref 4–5.4)
SAMPLE: ABNORMAL
SARS-COV-2 RDRP RESP QL NAA+PROBE: NEGATIVE
SITE: ABNORMAL
SODIUM BLD-SCNC: 136 MMOL/L (ref 136–145)
SODIUM SERPL-SCNC: 136 MMOL/L (ref 136–145)
SP GR UR STRIP: 1 (ref 1–1.03)
SPECIMEN SOURCE: NORMAL
TROPONIN I SERPL HS-MCNC: 47.8 PG/ML (ref 0–14.9)
URN SPEC COLLECT METH UR: NORMAL
UROBILINOGEN UR STRIP-ACNC: NEGATIVE EU/DL
WBC # BLD AUTO: 9.09 K/UL (ref 3.9–12.7)

## 2024-03-02 PROCEDURE — 87502 INFLUENZA DNA AMP PROBE: CPT | Performed by: EMERGENCY MEDICINE

## 2024-03-02 PROCEDURE — 82803 BLOOD GASES ANY COMBINATION: CPT

## 2024-03-02 PROCEDURE — 36600 WITHDRAWAL OF ARTERIAL BLOOD: CPT

## 2024-03-02 PROCEDURE — 25000003 PHARM REV CODE 250: Performed by: INTERNAL MEDICINE

## 2024-03-02 PROCEDURE — 36415 COLL VENOUS BLD VENIPUNCTURE: CPT | Performed by: INTERNAL MEDICINE

## 2024-03-02 PROCEDURE — 80053 COMPREHEN METABOLIC PANEL: CPT | Performed by: EMERGENCY MEDICINE

## 2024-03-02 PROCEDURE — 83605 ASSAY OF LACTIC ACID: CPT | Mod: 91 | Performed by: INTERNAL MEDICINE

## 2024-03-02 PROCEDURE — 85610 PROTHROMBIN TIME: CPT | Performed by: EMERGENCY MEDICINE

## 2024-03-02 PROCEDURE — 82550 ASSAY OF CK (CPK): CPT | Performed by: EMERGENCY MEDICINE

## 2024-03-02 PROCEDURE — 96375 TX/PRO/DX INJ NEW DRUG ADDON: CPT

## 2024-03-02 PROCEDURE — 25000242 PHARM REV CODE 250 ALT 637 W/ HCPCS: Performed by: INTERNAL MEDICINE

## 2024-03-02 PROCEDURE — 63600175 PHARM REV CODE 636 W HCPCS: Mod: UD | Performed by: INTERNAL MEDICINE

## 2024-03-02 PROCEDURE — 94799 UNLISTED PULMONARY SVC/PX: CPT

## 2024-03-02 PROCEDURE — 81003 URINALYSIS AUTO W/O SCOPE: CPT | Performed by: INTERNAL MEDICINE

## 2024-03-02 PROCEDURE — U0002 COVID-19 LAB TEST NON-CDC: HCPCS | Performed by: EMERGENCY MEDICINE

## 2024-03-02 PROCEDURE — 93010 ELECTROCARDIOGRAM REPORT: CPT | Mod: ,,, | Performed by: INTERNAL MEDICINE

## 2024-03-02 PROCEDURE — 36415 COLL VENOUS BLD VENIPUNCTURE: CPT | Performed by: EMERGENCY MEDICINE

## 2024-03-02 PROCEDURE — 84145 PROCALCITONIN (PCT): CPT | Performed by: EMERGENCY MEDICINE

## 2024-03-02 PROCEDURE — 82962 GLUCOSE BLOOD TEST: CPT

## 2024-03-02 PROCEDURE — 20000000 HC ICU ROOM

## 2024-03-02 PROCEDURE — 99900035 HC TECH TIME PER 15 MIN (STAT)

## 2024-03-02 PROCEDURE — 27100171 HC OXYGEN HIGH FLOW UP TO 24 HOURS

## 2024-03-02 PROCEDURE — 83605 ASSAY OF LACTIC ACID: CPT | Mod: 91 | Performed by: EMERGENCY MEDICINE

## 2024-03-02 PROCEDURE — 63600175 PHARM REV CODE 636 W HCPCS: Performed by: INTERNAL MEDICINE

## 2024-03-02 PROCEDURE — 96365 THER/PROPH/DIAG IV INF INIT: CPT

## 2024-03-02 PROCEDURE — 85025 COMPLETE CBC W/AUTO DIFF WBC: CPT | Performed by: EMERGENCY MEDICINE

## 2024-03-02 PROCEDURE — 93005 ELECTROCARDIOGRAM TRACING: CPT | Performed by: INTERNAL MEDICINE

## 2024-03-02 PROCEDURE — 94640 AIRWAY INHALATION TREATMENT: CPT

## 2024-03-02 PROCEDURE — 99223 1ST HOSP IP/OBS HIGH 75: CPT | Mod: ,,, | Performed by: INTERNAL MEDICINE

## 2024-03-02 PROCEDURE — 84484 ASSAY OF TROPONIN QUANT: CPT | Performed by: EMERGENCY MEDICINE

## 2024-03-02 PROCEDURE — 99900031 HC PATIENT EDUCATION (STAT)

## 2024-03-02 PROCEDURE — 63600175 PHARM REV CODE 636 W HCPCS: Mod: UD | Performed by: EMERGENCY MEDICINE

## 2024-03-02 PROCEDURE — 99291 CRITICAL CARE FIRST HOUR: CPT

## 2024-03-02 PROCEDURE — 83735 ASSAY OF MAGNESIUM: CPT | Performed by: EMERGENCY MEDICINE

## 2024-03-02 PROCEDURE — 87040 BLOOD CULTURE FOR BACTERIA: CPT | Performed by: EMERGENCY MEDICINE

## 2024-03-02 PROCEDURE — 25000003 PHARM REV CODE 250: Performed by: EMERGENCY MEDICINE

## 2024-03-02 PROCEDURE — 94761 N-INVAS EAR/PLS OXIMETRY MLT: CPT | Mod: XB

## 2024-03-02 PROCEDURE — 80162 ASSAY OF DIGOXIN TOTAL: CPT | Performed by: EMERGENCY MEDICINE

## 2024-03-02 PROCEDURE — 83880 ASSAY OF NATRIURETIC PEPTIDE: CPT | Performed by: EMERGENCY MEDICINE

## 2024-03-02 RX ORDER — INSULIN ASPART 100 [IU]/ML
0-10 INJECTION, SOLUTION INTRAVENOUS; SUBCUTANEOUS
Status: DISCONTINUED | OUTPATIENT
Start: 2024-03-02 | End: 2024-03-06 | Stop reason: HOSPADM

## 2024-03-02 RX ORDER — MUPIROCIN 20 MG/G
OINTMENT TOPICAL 2 TIMES DAILY
Status: DISCONTINUED | OUTPATIENT
Start: 2024-03-02 | End: 2024-03-06 | Stop reason: HOSPADM

## 2024-03-02 RX ORDER — SODIUM,POTASSIUM PHOSPHATES 280-250MG
2 POWDER IN PACKET (EA) ORAL
Status: DISCONTINUED | OUTPATIENT
Start: 2024-03-02 | End: 2024-03-06 | Stop reason: HOSPADM

## 2024-03-02 RX ORDER — ONDANSETRON HYDROCHLORIDE 2 MG/ML
4 INJECTION, SOLUTION INTRAVENOUS EVERY 6 HOURS PRN
Status: DISCONTINUED | OUTPATIENT
Start: 2024-03-02 | End: 2024-03-06 | Stop reason: HOSPADM

## 2024-03-02 RX ORDER — IPRATROPIUM BROMIDE 0.5 MG/2.5ML
0.5 SOLUTION RESPIRATORY (INHALATION) EVERY 8 HOURS
Status: DISCONTINUED | OUTPATIENT
Start: 2024-03-02 | End: 2024-03-03

## 2024-03-02 RX ORDER — IBUPROFEN 200 MG
16 TABLET ORAL
Status: DISCONTINUED | OUTPATIENT
Start: 2024-03-02 | End: 2024-03-06 | Stop reason: HOSPADM

## 2024-03-02 RX ORDER — HYDROXYZINE HYDROCHLORIDE 25 MG/1
25 TABLET, FILM COATED ORAL 3 TIMES DAILY PRN
Status: ON HOLD | COMMUNITY
End: 2024-03-06 | Stop reason: HOSPADM

## 2024-03-02 RX ORDER — TALC
500 POWDER (GRAM) TOPICAL DAILY
Status: DISCONTINUED | OUTPATIENT
Start: 2024-03-02 | End: 2024-03-02

## 2024-03-02 RX ORDER — LEVOFLOXACIN 5 MG/ML
750 INJECTION, SOLUTION INTRAVENOUS
Status: DISCONTINUED | OUTPATIENT
Start: 2024-03-02 | End: 2024-03-02

## 2024-03-02 RX ORDER — FUROSEMIDE 10 MG/ML
40 INJECTION INTRAMUSCULAR; INTRAVENOUS ONCE
Status: COMPLETED | OUTPATIENT
Start: 2024-03-02 | End: 2024-03-02

## 2024-03-02 RX ORDER — ACETAMINOPHEN 325 MG/1
650 TABLET ORAL EVERY 8 HOURS PRN
Status: DISCONTINUED | OUTPATIENT
Start: 2024-03-02 | End: 2024-03-06 | Stop reason: HOSPADM

## 2024-03-02 RX ORDER — FUROSEMIDE 10 MG/ML
40 INJECTION INTRAMUSCULAR; INTRAVENOUS 2 TIMES DAILY
Status: DISCONTINUED | OUTPATIENT
Start: 2024-03-02 | End: 2024-03-02

## 2024-03-02 RX ORDER — DULOXETIN HYDROCHLORIDE 30 MG/1
30 CAPSULE, DELAYED RELEASE ORAL DAILY
Status: DISCONTINUED | OUTPATIENT
Start: 2024-03-02 | End: 2024-03-06 | Stop reason: HOSPADM

## 2024-03-02 RX ORDER — PHENYLEPHRINE HCL 10 MG
500 TABLET ORAL DAILY
COMMUNITY
End: 2024-03-02

## 2024-03-02 RX ORDER — GLUCAGON 1 MG
1 KIT INJECTION
Status: DISCONTINUED | OUTPATIENT
Start: 2024-03-02 | End: 2024-03-06 | Stop reason: HOSPADM

## 2024-03-02 RX ORDER — ALPRAZOLAM 0.5 MG/1
0.5 TABLET ORAL 3 TIMES DAILY PRN
Status: DISCONTINUED | OUTPATIENT
Start: 2024-03-02 | End: 2024-03-02

## 2024-03-02 RX ORDER — ACETAMINOPHEN 325 MG/1
650 TABLET ORAL EVERY 4 HOURS PRN
Status: DISCONTINUED | OUTPATIENT
Start: 2024-03-02 | End: 2024-03-06 | Stop reason: HOSPADM

## 2024-03-02 RX ORDER — ONDANSETRON 4 MG/1
4 TABLET, ORALLY DISINTEGRATING ORAL EVERY 6 HOURS PRN
COMMUNITY

## 2024-03-02 RX ORDER — IBUPROFEN 200 MG
24 TABLET ORAL
Status: DISCONTINUED | OUTPATIENT
Start: 2024-03-02 | End: 2024-03-06 | Stop reason: HOSPADM

## 2024-03-02 RX ORDER — METOPROLOL SUCCINATE 50 MG/1
100 TABLET, EXTENDED RELEASE ORAL DAILY
Status: DISCONTINUED | OUTPATIENT
Start: 2024-03-02 | End: 2024-03-06 | Stop reason: HOSPADM

## 2024-03-02 RX ORDER — LEVOFLOXACIN 5 MG/ML
750 INJECTION, SOLUTION INTRAVENOUS
Status: CANCELLED | OUTPATIENT
Start: 2024-03-02

## 2024-03-02 RX ORDER — LANOLIN ALCOHOL/MO/W.PET/CERES
800 CREAM (GRAM) TOPICAL
Status: DISCONTINUED | OUTPATIENT
Start: 2024-03-02 | End: 2024-03-06 | Stop reason: HOSPADM

## 2024-03-02 RX ORDER — DIGOXIN 125 MCG
125 TABLET ORAL DAILY
Status: DISCONTINUED | OUTPATIENT
Start: 2024-03-02 | End: 2024-03-06 | Stop reason: HOSPADM

## 2024-03-02 RX ORDER — ASPIRIN 81 MG/1
81 TABLET ORAL DAILY
Status: DISCONTINUED | OUTPATIENT
Start: 2024-03-02 | End: 2024-03-06 | Stop reason: HOSPADM

## 2024-03-02 RX ORDER — TALC
6 POWDER (GRAM) TOPICAL NIGHTLY PRN
Status: DISCONTINUED | OUTPATIENT
Start: 2024-03-02 | End: 2024-03-06 | Stop reason: HOSPADM

## 2024-03-02 RX ORDER — FAMOTIDINE 20 MG/1
20 TABLET, FILM COATED ORAL DAILY
Status: DISCONTINUED | OUTPATIENT
Start: 2024-03-02 | End: 2024-03-06 | Stop reason: HOSPADM

## 2024-03-02 RX ORDER — LANOLIN ALCOHOL/MO/W.PET/CERES
400 CREAM (GRAM) TOPICAL DAILY
Status: DISCONTINUED | OUTPATIENT
Start: 2024-03-02 | End: 2024-03-06 | Stop reason: HOSPADM

## 2024-03-02 RX ORDER — INSULIN ASPART 100 [IU]/ML
40 INJECTION, SUSPENSION SUBCUTANEOUS
Status: DISCONTINUED | OUTPATIENT
Start: 2024-03-02 | End: 2024-03-02

## 2024-03-02 RX ORDER — FUROSEMIDE 10 MG/ML
80 INJECTION INTRAMUSCULAR; INTRAVENOUS 2 TIMES DAILY
Status: DISCONTINUED | OUTPATIENT
Start: 2024-03-02 | End: 2024-03-04

## 2024-03-02 RX ORDER — TRIAMCINOLONE ACETONIDE 1 MG/G
1 CREAM TOPICAL 2 TIMES DAILY
Status: ON HOLD | COMMUNITY
End: 2024-06-13 | Stop reason: HOSPADM

## 2024-03-02 RX ORDER — FUROSEMIDE 10 MG/ML
40 INJECTION INTRAMUSCULAR; INTRAVENOUS EVERY 12 HOURS
Status: DISCONTINUED | OUTPATIENT
Start: 2024-03-02 | End: 2024-03-02

## 2024-03-02 RX ORDER — ALUMINUM HYDROXIDE, MAGNESIUM HYDROXIDE, AND SIMETHICONE 1200; 120; 1200 MG/30ML; MG/30ML; MG/30ML
30 SUSPENSION ORAL 4 TIMES DAILY PRN
Status: DISCONTINUED | OUTPATIENT
Start: 2024-03-02 | End: 2024-03-06 | Stop reason: HOSPADM

## 2024-03-02 RX ORDER — NALOXONE HCL 0.4 MG/ML
0.02 VIAL (ML) INJECTION
Status: DISCONTINUED | OUTPATIENT
Start: 2024-03-02 | End: 2024-03-06 | Stop reason: HOSPADM

## 2024-03-02 RX ORDER — PYRIDOXINE HCL (VITAMIN B6) 100 MG
1 TABLET ORAL DAILY
COMMUNITY
Start: 2024-02-23

## 2024-03-02 RX ORDER — FLUTICASONE PROPIONATE 50 MCG
1 SPRAY, SUSPENSION (ML) NASAL DAILY
COMMUNITY
Start: 2024-03-01

## 2024-03-02 RX ORDER — FUROSEMIDE 10 MG/ML
40 INJECTION INTRAMUSCULAR; INTRAVENOUS
Status: DISCONTINUED | OUTPATIENT
Start: 2024-03-02 | End: 2024-03-02

## 2024-03-02 RX ORDER — BUDESONIDE 0.5 MG/2ML
0.5 INHALANT ORAL EVERY 12 HOURS
Status: DISCONTINUED | OUTPATIENT
Start: 2024-03-02 | End: 2024-03-03

## 2024-03-02 RX ORDER — IBUPROFEN 200 MG
16 TABLET ORAL
COMMUNITY
Start: 2023-06-20

## 2024-03-02 RX ORDER — LEVALBUTEROL INHALATION SOLUTION 1.25 MG/3ML
1.25 SOLUTION RESPIRATORY (INHALATION) EVERY 8 HOURS
Status: DISCONTINUED | OUTPATIENT
Start: 2024-03-02 | End: 2024-03-03

## 2024-03-02 RX ADMIN — MUPIROCIN 1 G: 20 OINTMENT TOPICAL at 10:03

## 2024-03-02 RX ADMIN — APIXABAN 5 MG: 5 TABLET, FILM COATED ORAL at 10:03

## 2024-03-02 RX ADMIN — INSULIN ASPART 2 UNITS: 100 INJECTION, SOLUTION INTRAVENOUS; SUBCUTANEOUS at 04:03

## 2024-03-02 RX ADMIN — IPRATROPIUM BROMIDE 0.5 MG: 0.5 SOLUTION RESPIRATORY (INHALATION) at 04:03

## 2024-03-02 RX ADMIN — FUROSEMIDE 40 MG: 10 INJECTION, SOLUTION INTRAMUSCULAR; INTRAVENOUS at 01:03

## 2024-03-02 RX ADMIN — LEVALBUTEROL HYDROCHLORIDE 1.25 MG: 1.25 SOLUTION RESPIRATORY (INHALATION) at 04:03

## 2024-03-02 RX ADMIN — CEFTRIAXONE SODIUM 2 G: 2 INJECTION, POWDER, FOR SOLUTION INTRAMUSCULAR; INTRAVENOUS at 12:03

## 2024-03-02 RX ADMIN — DULOXETINE HYDROCHLORIDE 30 MG: 30 CAPSULE, DELAYED RELEASE ORAL at 02:03

## 2024-03-02 RX ADMIN — INSULIN ASPART 40 UNITS: 100 INJECTION, SUSPENSION SUBCUTANEOUS at 04:03

## 2024-03-02 RX ADMIN — FUROSEMIDE 80 MG: 10 INJECTION, SOLUTION INTRAVENOUS at 06:03

## 2024-03-02 RX ADMIN — ASPIRIN 81 MG: 81 TABLET, COATED ORAL at 02:03

## 2024-03-02 RX ADMIN — DIGOXIN 125 MCG: 125 TABLET ORAL at 02:03

## 2024-03-02 RX ADMIN — ALPRAZOLAM 0.5 MG: 0.5 TABLET ORAL at 12:03

## 2024-03-02 RX ADMIN — BUDESONIDE 0.5 MG: 0.5 INHALANT RESPIRATORY (INHALATION) at 07:03

## 2024-03-02 RX ADMIN — Medication 400 MG: at 02:03

## 2024-03-02 RX ADMIN — FUROSEMIDE 40 MG: 10 INJECTION, SOLUTION INTRAMUSCULAR; INTRAVENOUS at 12:03

## 2024-03-02 RX ADMIN — FAMOTIDINE 20 MG: 20 TABLET ORAL at 02:03

## 2024-03-02 RX ADMIN — METOPROLOL SUCCINATE 100 MG: 50 TABLET, EXTENDED RELEASE ORAL at 02:03

## 2024-03-02 NOTE — ED PROVIDER NOTES
"Encounter Date: 3/2/2024       History     Chief Complaint   Patient presents with    Shortness of Breath     SOB "for a while" per patient      This is a 88-year-old female with past medical history of coronary artery disease, hypertension, hyperlipidemia, pacemaker, reflux, who presents emergency department with shortness of breath.  Patient does state that she has had a cough occasionally productive of sputum.  She woke up this morning with very short of breath.  Could not catch her breath.  Out in triage she was 65% on room air.  When we got her back in the room she was 73% on room air.  Patient does not wear oxygen.  She does endorse some muscle aches body aches.  History is limited by her respiratory distress and speaking in short phrases.      Review of patient's allergies indicates:   Allergen Reactions    Morphine Anxiety     Past Medical History:   Diagnosis Date    Arthritis     Coronary artery disease     Diverticulitis     GERD (gastroesophageal reflux disease)     Hyperlipidemia     Hypertension      Past Surgical History:   Procedure Laterality Date    CARDIAC CATHETERIZATION      CARDIAC PACEMAKER PLACEMENT  2007    CARDIAC VALVE SURGERY      CHOLECYSTECTOMY      CORONARY ARTERY BYPASS GRAFT      HYSTERECTOMY      TOTAL KNEE ARTHROPLASTY Right     TOTAL KNEE ARTHROPLASTY Left      Family History   Problem Relation Age of Onset    Hypertension Mother      Social History     Tobacco Use    Smoking status: Former    Smokeless tobacco: Never   Substance Use Topics    Alcohol use: No    Drug use: No     Review of Systems   Unable to perform ROS: Severe respiratory distress       Physical Exam     Initial Vitals   BP Pulse Resp Temp SpO2   03/02/24 1132 03/02/24 1132 03/02/24 1131 03/02/24 1132 03/02/24 1132   (!) 165/76 84 18 97.9 °F (36.6 °C) (!) 82 %      MAP       --                Physical Exam    Nursing note and vitals reviewed.  Constitutional: She appears well-developed and well-nourished. She " appears distressed.   HENT:   Head: Normocephalic and atraumatic.   Left Ear: External ear normal.   Mouth/Throat: No oropharyngeal exudate.   Eyes: Conjunctivae and EOM are normal. Pupils are equal, round, and reactive to light.   Neck: Neck supple. No tracheal deviation present.   Cardiovascular:  Normal rate, regular rhythm, normal heart sounds and intact distal pulses.           No murmur heard.  Pulmonary/Chest: Breath sounds normal. No stridor. She is in respiratory distress. She has no wheezes. She has no rhonchi. She has no rales.   Tachypnea, speaking in short phrases   Abdominal: Abdomen is soft. She exhibits no distension. There is no abdominal tenderness. There is no rebound.   Musculoskeletal:         General: No tenderness or edema. Normal range of motion.      Cervical back: Neck supple.     Neurological: She is alert and oriented to person, place, and time. She has normal strength. No cranial nerve deficit or sensory deficit.   Skin: Skin is warm and dry. Capillary refill takes less than 2 seconds. No rash noted. No erythema. No pallor.   Psychiatric: She has a normal mood and affect. Thought content normal.         ED Course   Procedures  Labs Reviewed   CBC W/ AUTO DIFFERENTIAL - Abnormal; Notable for the following components:       Result Value    RBC 3.97 (*)     RDW 16.9 (*)     Lymph % 17.4 (*)     All other components within normal limits   COMPREHENSIVE METABOLIC PANEL - Abnormal; Notable for the following components:    Glucose 241 (*)     eGFR 48.6 (*)     All other components within normal limits   LACTIC ACID, PLASMA - Abnormal; Notable for the following components:    Lactate (Lactic Acid) 3.7 (*)     All other components within normal limits   B-TYPE NATRIURETIC PEPTIDE - Abnormal; Notable for the following components:     (*)     All other components within normal limits   TROPONIN I HIGH SENSITIVITY - Abnormal; Notable for the following components:    Troponin I High  Sensitivity 47.8 (*)     All other components within normal limits   CK - Abnormal; Notable for the following components:    CPK <10 (*)     All other components within normal limits   ISTAT PROCEDURE - Abnormal; Notable for the following components:    POC PCO2 32.9 (*)     POC PO2 58 (*)     POC HCO3 21.9 (*)     POC Glucose 239 (*)     All other components within normal limits   CULTURE, BLOOD   CULTURE, BLOOD   INFLUENZA A AND B ANTIGEN    Narrative:     Specimen Source->Nasopharyngeal Swab   MAGNESIUM   PROTIME-INR   PROCALCITONIN   SARS-COV-2 RNA AMPLIFICATION, QUAL   DIGOXIN LEVEL   DIGOXIN LEVEL   LACTIC ACID, PLASMA   URINALYSIS, REFLEX TO URINE CULTURE   POCT GLUCOSE MONITORING CONTINUOUS        ECG Results              EKG 12-lead (In process)        Collection Time Result Time QRS Duration OHS QTC Calculation    03/02/24 11:50:10 03/02/24 12:56:20 94 439                     In process by Interface, Lab In [facility] (03/02/24 12:56:24)                   Narrative:    Test Reason : R06.00,    Vent. Rate : 077 BPM     Atrial Rate : 068 BPM     P-R Int : 000 ms          QRS Dur : 094 ms      QT Int : 388 ms       P-R-T Axes : 000 -30 199 degrees     QTc Int : 439 ms    Undetermined rhythm  Left axis deviation  Septal infarct ,age undetermined  Marked ST abnormality, possible lateral subendocardial injury  Abnormal ECG  When compared with ECG of 19-NOV-2023 18:05,  Current undetermined rhythm precludes rhythm comparison, needs review    Referred By: [email or web address]   SELF           Confirmed By:                                   Results for orders placed or performed during the hospital encounter of 03/02/24   CBC auto differential   Result Value Ref Range    WBC 9.09 3.90 - 12.70 K/uL    RBC 3.97 (L) 4.00 - 5.40 M/uL    Hemoglobin 12.3 12.0 - 16.0 g/dL    Hematocrit 38.1 37.0 - 48.5 %    MCV 96 82 - 98 fL    MCH 31.0 27.0 - 31.0 pg    MCHC 32.3 32.0 - 36.0 g/dL    RDW 16.9 (H) 11.5 - 14.5 %    Platelets 183 150 -  450 K/uL    MPV 11.2 9.2 - 12.9 fL    Immature Granulocytes 0.4 0.0 - 0.5 %    Gran # (ANC) 6.3 1.8 - 7.7 K/uL    Immature Grans (Abs) 0.04 0.00 - 0.04 K/uL    Lymph # 1.6 1.0 - 4.8 K/uL    Mono # 0.9 0.3 - 1.0 K/uL    Eos # 0.1 0.0 - 0.5 K/uL    Baso # 0.11 0.00 - 0.20 K/uL    nRBC 0 0 /100 WBC    Gran % 69.8 38.0 - 73.0 %    Lymph % 17.4 (L) 18.0 - 48.0 %    Mono % 10.2 4.0 - 15.0 %    Eosinophil % 1.0 0.0 - 8.0 %    Basophil % 1.2 0.0 - 1.9 %    Differential Method Automated    Comprehensive metabolic panel   Result Value Ref Range    Sodium 136 136 - 145 mmol/L    Potassium 4.5 3.5 - 5.1 mmol/L    Chloride 102 95 - 110 mmol/L    CO2 26 23 - 29 mmol/L    Glucose 241 (H) 70 - 110 mg/dL    BUN 12 8 - 23 mg/dL    Creatinine 1.1 0.5 - 1.4 mg/dL    Calcium 9.5 8.7 - 10.5 mg/dL    Total Protein 7.3 6.0 - 8.4 g/dL    Albumin 3.9 3.5 - 5.2 g/dL    Total Bilirubin 1.0 0.1 - 1.0 mg/dL    Alkaline Phosphatase 71 55 - 135 U/L    AST 24 10 - 40 U/L    ALT 14 10 - 44 U/L    eGFR 48.6 (A) >60 mL/min/1.73 m^2    Anion Gap 8 8 - 16 mmol/L   Lactic acid, plasma #1   Result Value Ref Range    Lactate (Lactic Acid) 3.7 (HH) 0.5 - 1.9 mmol/L   Influenza antigen Nasopharyngeal Swab   Result Value Ref Range    Influenza A, Molecular Negative Negative    Influenza B, Molecular Negative Negative    Flu A & B Source Nasal swab    Magnesium   Result Value Ref Range    Magnesium 1.9 1.6 - 2.6 mg/dL   Protime-INR   Result Value Ref Range    Prothrombin Time 12.2 9.0 - 12.5 sec    INR 1.1 0.8 - 1.2   Brain natriuretic peptide   Result Value Ref Range     (H) 0 - 99 pg/mL   Procalcitonin   Result Value Ref Range    Procalcitonin 0.052 0.000 - 0.500 ng/mL   Troponin I High Sensitivity   Result Value Ref Range    Troponin I High Sensitivity 47.8 (H) 0.0 - 14.9 pg/mL   COVID-19 Rapid Screening   Result Value Ref Range    SARS-CoV-2 RNA, Amplification, Qual Negative Negative   Digoxin Level   Result Value Ref Range    Digoxin Lvl 1.1 0.8 -  2.0 ng/mL   CK   Result Value Ref Range    CPK <10 (L) 20 - 180 U/L   EKG 12-lead   Result Value Ref Range    QRS Duration 94 ms    OHS QTC Calculation 439 ms   ISTAT PROCEDURE   Result Value Ref Range    POC PH 7.431 7.35 - 7.45    POC PCO2 32.9 (L) 35 - 45 mmHg    POC PO2 58 (LL) 80 - 100 mmHg    POC HCO3 21.9 (L) 24 - 28 mmol/L    POC BE -2 -2 to 2 mmol/L    POC SATURATED O2 91 95 - 100 %    POC Glucose 239 (H) 70 - 110 mg/dL    POC Sodium 136 136 - 145 mmol/L    POC Potassium 4.6 3.5 - 5.1 mmol/L    POC TCO2 23 23 - 27 mmol/L    POC Ionized Calcium 1.19 1.06 - 1.42 mmol/L    POC Hematocrit 36 36 - 54 %PCV    Sample ARTERIAL     Site RR     Allens Test Pass     DelSys HFDD     Mode SPONT     Flow 13        Imaging Results              X-Ray Chest AP Portable (Final result)  Result time 03/02/24 12:01:53      Final result by Kenn Guerrier MD (03/02/24 12:01:53)                   Narrative:    HISTORY: Sepsis    FINDINGS: Portable chest radiograph at 1139 hours compared to 11/19/2023 shows dual lead left subclavian CCD unchanged in position, with median sternotomy wires and changes of transarterial aortic valvuloplasty. The cardiac silhouette is enlarged, stable in size, with normal pulmonary vascularity and scattered aortic vascular calcifications.    There are scattered bilateral lower lung reticulonodular and acinar opacities, with blunting of the costophrenic angles. The upper lungs are clear, with no evidence of interstitial pulmonary edema or pneumothorax.    IMPRESSION: Bilateral lower lung opacities suggesting multifocal pneumonia, with small pleural effusions not excluded. Follow-up PA and lateral chest radiograph in 4-6 weeks after appropriate therapy is recommended to document complete resolution.    Electronically signed by:  Kenn Guerrier MD  03/02/2024 12:01 PM CST Workstation: [id number]                                     Medications   apixaban tablet 5 mg (has no administration in time range)    aspirin EC tablet 81 mg (has no administration in time range)   digoxin tablet 125 mcg (has no administration in time range)   DULoxetine DR capsule 30 mg (has no administration in time range)   insulin NPH-insulin regular (70/30) injection 40 Units (has no administration in time range)   metoprolol succinate (TOPROL-XL) 24 hr tablet 100 mg (has no administration in time range)   melatonin tablet 6 mg (has no administration in time range)   ondansetron injection 4 mg (has no administration in time range)   acetaminophen tablet 650 mg (has no administration in time range)   aluminum-magnesium hydroxide-simethicone 200-200-20 mg/5 mL suspension 30 mL (has no administration in time range)   acetaminophen tablet 650 mg (has no administration in time range)   naloxone 0.4 mg/mL injection 0.02 mg (has no administration in time range)   potassium bicarbonate disintegrating tablet 50 mEq (has no administration in time range)   potassium bicarbonate disintegrating tablet 35 mEq (has no administration in time range)   potassium bicarbonate disintegrating tablet 60 mEq (has no administration in time range)   magnesium oxide tablet 800 mg (has no administration in time range)   magnesium oxide tablet 800 mg (has no administration in time range)   potassium, sodium phosphates 280-160-250 mg packet 2 packet (has no administration in time range)   potassium, sodium phosphates 280-160-250 mg packet 2 packet (has no administration in time range)   potassium, sodium phosphates 280-160-250 mg packet 2 packet (has no administration in time range)   glucose chewable tablet 16 g (has no administration in time range)   glucose chewable tablet 24 g (has no administration in time range)   dextrose 50% injection 12.5 g (has no administration in time range)   dextrose 50% injection 25 g (has no administration in time range)   glucagon (human recombinant) injection 1 mg (has no administration in time range)   insulin aspart U-100 pen 0-10  Units (has no administration in time range)   famotidine tablet 20 mg (has no administration in time range)   levalbuterol nebulizer solution 1.25 mg (has no administration in time range)   ipratropium 0.02 % nebulizer solution 0.5 mg (has no administration in time range)   budesonide nebulizer solution 0.5 mg (has no administration in time range)   magnesium oxide tablet 400 mg (has no administration in time range)   furosemide injection 40 mg (has no administration in time range)   furosemide injection 40 mg (has no administration in time range)   furosemide injection 40 mg (40 mg Intravenous Given 3/2/24 1257)     Medical Decision Making  Differential includes but not limited to congestive heart failure, pulmonary embolus, pneumonia, COPD exacerbation, dehydration, acute coronary syndrome, pulmonary edema,    Emergent evaluation of a 88-year-old female presents emergency department with shortness of breath.  Patient is hypoxic on evaluation in the emergency department.  Patient with respiratory distress speaking in short phrases.  Patient placed on oxygen 10 L, ABG obtained she is hypoxic.  CO2 within normal limits.  Chest x-ray concerning for multilobar pneumonia.  Patient has been given azithromycin and IV Rocephin in the emergency department.  Patient will be admitted to the hospital for oxygen, antibiotics and further care and treatment of her respiratory illness.    Amount and/or Complexity of Data Reviewed  External Data Reviewed: labs and notes.  Labs: ordered. Decision-making details documented in ED Course.  Radiology: ordered and independent interpretation performed. Decision-making details documented in ED Course.  ECG/medicine tests: ordered and independent interpretation performed. Decision-making details documented in ED Course.  Discussion of management or test interpretation with external provider(s): This patient does have evidence of infective focus  My overall impression is sepsis.  Source:  Respiratory  Antibiotics given- Antibiotics (72h ago, onward)    None      Latest lactate reviewed-  Lab             03/02/24                       1141          LACTATE      3.7*          Organ dysfunction indicated by Acute respiratory failure    Fluid challenge Actual Body weight- Patient will receive 30ml/kg actual body weight to calculate fluid bolus for treatment of septic shock.     Post- resuscitation assessment Yes Perfusion exam was performed within 6 hours of septic shock presentation after bolus shows Adequate tissue perfusion assessed by non-invasive monitoring       Will Not start Pressors- Levophed for MAP of 65  Source control achieved by: Iv antibiotics      Risk  Decision regarding hospitalization.              Attending Attestation:             Attending ED Notes:   Attending Critical Care:   Critical Care Times:   Direct Patient Care (initial evaluation, reassessments, and time considering the case)................................................................10 minutes.   Additional History from reviewing old medical records or taking additional history from the family, EMS, PCP, etc.......................10 minutes.   Ordering, Reviewing, and Interpreting Diagnostic Studies...............................................................................................................10 minutes.   Documentation..................................................................................................................................................................................5 minutes.   ==============================================================  · Total Critical Care Time - exclusive of procedural time: 35 minutes.  ==============================================================  Critical care was necessary to treat or prevent imminent or life-threatening deterioration of the following conditions:  Acute pneumonia, hypoxemia.   Critical care was time spent personally by me on the  following activities: obtaining history from patient or relative, examination of patient, review of x-rays / CT sent with the patient, ordering lab, x-rays, and/or EKG, development of treatment plan with patient or relative, ordering and performing treatments and interventions, interpretation of cardiac measurements and re-evaluation of patient's conition.   Critical Care Condition: potentially life-threatening         ED Course as of 03/02/24 1327   Sat Mar 02, 2024   1159 EKG 11:50 a.m. intermittently paced rhythm, occasional premature ventricular complexes.  Left axis deviation.  No STEMI.  EKG interpreted independently by me. [JR]   1244 I discussed the case with Dr. Gabriel from [facility] will admit the patient. [JR]      ED Course User Index  [JR] Juan Pablo Farmer DO                           Clinical Impression:  Final diagnoses:  [R06.00] Dyspnea  [J18.9] Pneumonia of both lungs due to infectious organism, unspecified part of lung (Primary)  [R09.02] Hypoxemia  [J96.01] Acute hypoxemic respiratory failure          ED Disposition Condition    Admit Stable                Juan Pablo Farmer DO  03/02/24 1237       Juan Pablo Farmer DO  03/02/24 1327

## 2024-03-02 NOTE — PLAN OF CARE
[facility]  Initial Discharge Assessment       Primary Care Provider: Michela Christian MD    Admission Diagnosis: Pneumonia of both lungs due to infectious organism, unspecified part of lung [J18.9]  Acute hypoxemic respiratory failure [J96.01]    Admission Date: 3/2/2024  Expected Discharge Date:      Assessment complete with pt and daughter Corinne at bedside.  Pt AAOx4s. Demographics, PCP, and insurance verified. No home health. No dialysis. Pt reports ability to complete ADLs with the assistance of a cane, shower chair . Pt verbalized plan to discharge home via daughter's transport. Pt has no other needs to be addressed at this time    Transition of Care Barriers: None    Payor: HU[employer]NA MANAGED MEDICARE / Plan: [employer] HMO PPO SPECIAL NEEDS / Product Type: Medicare Advantage /     Extended Emergency Contact Information  Primary Emergency Contact: Sofie Chew  Address: [address]  Home Phone: [phone]  Mobile Phone: [phone]  Relation: Daughter  Preferred language: English   needed? No  Secondary Emergency Contact: Corinne Rowell  Mobile Phone: [phone]  Relation: Daughter  Preferred language: English   needed? No    Discharge Plan A: Home with family  Discharge Plan B: Home with family      [facility] [place] [place] - [address]  [address]  [place] [address]  Phone: [phone] Fax: [phone]    [facility] - [facility] [address]  [address] [address]  Phone: [phone] Fax: [phone]      Initial Assessment (most recent)       Adult Discharge Assessment - 03/02/24 1552          Discharge Assessment    Assessment Type Discharge Planning Assessment     Confirmed/corrected address, phone number and insurance Yes     Confirmed Demographics Correct on Facesheet     Source of Information patient;health record      Reason For Admission Acute hypoxemic respiratory failure     People in Home child(ade), adult     Facility Arrived From: Home     Do you expect to return to your current living situation? Yes     Do you have help at home or someone to help you manage your care at home? Yes     Who are your caregiver(s) and their phone number(s)? Sofie Chew  Daughter  [phone]    2  Corinne Rowell  Daughter  [phone]     Prior to hospitilization cognitive status: Alert/Oriented     Current cognitive status: Alert/Oriented     Walking or Climbing Stairs Difficulty yes     Walking or Climbing Stairs ambulation difficulty, requires equipment     Mobility Management Cane     Dressing/Bathing Difficulty yes     Dressing/Bathing bathing difficulty, requires equipment     Dressing/Bathing Management Shower Chair     Home Accessibility wheelchair accessible     Equipment Currently Used at Home cane, straight;shower chair;nebulizer     Readmission within 30 days? No     Patient currently being followed by outpatient case management? No     Do you currently have service(s) that help you manage your care at home? No     Do you take prescription medications? Yes     Do you have prescription coverage? Yes     Coverage HUMANA MANAGED MEDICARE - [facility] SPECIAL [facility]S     Do you have any problems affording any of your prescribed medications? No     Is the patient taking medications as prescribed? yes     Who is going to help you get home at discharge? Sofie Chew Daughter [phone] 2 Corinne Rowell Daughter [phone]     How do you get to doctors appointments? family or friend will provide     Are you on dialysis? No     Do you take coumadin? No     Discharge Plan A Home with family     Discharge Plan B Home with family     DME Needed Upon Discharge  none     Discharge Plan discussed with: Patient;Adult children     Transition of Care Barriers None

## 2024-03-02 NOTE — HPI
88 year old pt getting admitted with a cute hypoxic resp failure and acute CHF flare  History is limited because pt is a very poor historian  Per pt she started having shortness of breath since yesterday  Later she noticed SOB on exertion and today AM she had SOB on rest  She came to hospital and got admitted  Denies fever/chest pain or other issues   Pt was admitted to ICU because of resp distress and in need of HF O2 in ER  After diuresis pt felt better  Case discussed with Dr Grant and decision was made to hold any antibiotics at this point      Pt had CABG in 2009  Pt had LHC in Feb 2018 with no PCI  Pt had Normal stress test on November 2023  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

## 2024-03-02 NOTE — PLAN OF CARE
Problem: Adult Inpatient Plan of Care  Goal: Plan of Care Review  Outcome: Ongoing, Progressing  Goal: Patient-Specific Goal (Individualized)  Outcome: Ongoing, Progressing  Goal: Absence of Hospital-Acquired Illness or Injury  Outcome: Ongoing, Progressing  Goal: Optimal Comfort and Wellbeing  Outcome: Ongoing, Progressing  Goal: Readiness for Transition of Care  Outcome: Ongoing, Progressing     Problem: Diabetes Comorbidity  Goal: Blood Glucose Level Within Targeted Range  Outcome: Ongoing, Progressing     Problem: Fluid Imbalance (Pneumonia)  Goal: Fluid Balance  Outcome: Ongoing, Progressing     Problem: Infection (Pneumonia)  Goal: Resolution of Infection Signs and Symptoms  Outcome: Ongoing, Progressing     Problem: Respiratory Compromise (Pneumonia)  Goal: Effective Oxygenation and Ventilation  Outcome: Ongoing, Progressing     Problem: Skin Injury Risk Increased  Goal: Skin Health and Integrity  Outcome: Ongoing, Progressing     Problem: Fall Injury Risk  Goal: Absence of Fall and Fall-Related Injury  Outcome: Ongoing, Progressing     Problem: Impaired Wound Healing  Goal: Optimal Wound Healing  Outcome: Ongoing, Progressing    Patient admitted to ICU3 at 1400. Pt arrived aaox4 with a delay.  HEENT WDL ex L eye. 1mm in diameter but reactive. Pt also arrived with top dentures. Claimed bottom dentures were left at home. All lung fields were diminished with crackles heard in both lower lobes.  Patient claims no tenderness.  Distal pulses are 2+ and easy to find.  Bowel sounds are normoactive with no tenderness reported.  Patient's groin area appears have some moisture dermatitis.  Wound care notified and wounds documented. Patient is currently not on any continuous fluid and has had an output of over 1200 ml. Will continue to monitor as care plan progresses.

## 2024-03-02 NOTE — ASSESSMENT & PLAN NOTE
Maintain iv lasix 80 mg BID  If in need can be started on lasix drip  ECHO ordered by Pulmonology MD  Follow up with Cardiologist in clinic

## 2024-03-02 NOTE — PHARMACY MED REC
"              .        Admission Medication History     The home medication history was taken by Minerva Herring.    You may go to "Admission" then "Reconcile Home Medications" tabs to review and/or act upon these items.     The home medication list has been updated by the Pharmacy department.   Please read ALL comments highlighted in yellow.   Please address this information as you see fit.    Feel free to contact us if you have any questions or require assistance.        Medications listed below were obtained from: Patient/family and Analytic software- [employer]  No current facility-administered medications on file prior to encounter.     Current Outpatient Medications on File Prior to Encounter   Medication Sig Dispense Refill    albuterol (VENTOLIN HFA) 90 mcg/actuation inhaler Inhale 1 puff into the lungs every 4 (four) hours as needed for Wheezing.       apixaban (ELIQUIS) 5 mg Tab Take 1 tablet (5 mg total) by mouth 2 (two) times daily. 180 tablet 3    ascorbic acid, vitamin C, 500 mg Cap Take 500 mg by mouth once daily.      BAQSIMI 3 mg/actuation Spry 1 Dose by Each Nostril route daily as needed.      calcium carbonate-vitamin D3 600 mg-5 mcg (200 unit) Cap Take 1 capsule by mouth once daily.      cyanocobalamin 500 MCG tablet Take 1 tablet by mouth once daily.       diclofenac sodium (VOLTAREN) 1 % Gel Apply 4 g topically 2 (two) times daily.      digoxin (LANOXIN) 125 mcg tablet Take 1 tablet (125 mcg total) by mouth once daily. 90 tablet 3    diltiaZEM (CARDIZEM CD) 120 MG Cp24 Take 1 capsule (120 mg total) by mouth once daily. 90 capsule 1    DULoxetine (CYMBALTA) 30 MG capsule Take 30 mg by mouth once daily.       fluticasone propionate (FLONASE) 50 mcg/actuation nasal spray 1 spray by Each Nostril route once daily.      furosemide (LASIX) 40 MG tablet Take 40 mg by mouth once daily.       glucose 4 GM chewable tablet Take 16 g by mouth as needed for Low blood sugar.      hydrocortisone " 2.5 % cream Apply 1 application  topically as needed.      ibuprofen (ADVIL,MOTRIN) 200 MG tablet Take 200 mg by mouth every 6 (six) hours as needed for Pain.      insulin NPH-insulin regular, 70/30, 100 unit/mL (70-30) injection Inject 80 Units into the skin 2 (two) times daily before meals.      levothyroxine (SYNTHROID, LEVOTHROID) 175 MCG tablet Take 175 mcg by mouth once daily.       magnesium oxide (MAG-OX) 250 mg Tab Take 400 mg by mouth once daily.       metoprolol succinate (TOPROL-XL) 100 MG 24 hr tablet Take 1 tablet (100 mg total) by mouth once daily. (Patient taking differently: Take 100 mg by mouth 2 (two) times daily.)      nitroGLYCERIN (NITROSTAT) 0.4 MG SL tablet Place 0.4 mg under the tongue every 5 (five) minutes as needed for Chest pain.      NOVOLOG MIX 70-30 FLEXPEN 100 unit/mL (70-30) InPn pen Inject into the skin 2 (two) times daily. 35 units if glucose <150  40 units if glucose >150      nystatin (MYCOSTATIN) cream Apply 100,000 Units topically 2 (two) times daily.      omega-3 fatty acids-fish oil 340-1,000 mg Cap Take 1 capsule by mouth once daily. 90 capsule 3    ondansetron (ZOFRAN-ODT) 4 MG TbDL Take 4 mg by mouth every 6 (six) hours as needed.      pantoprazole (PROTONIX) 20 MG tablet Take 20 mg by mouth once daily.       potassium gluconate 2.5 mEq Tab Take 3 tablets by mouth 2 (two) times a day.      pyridoxine, vitamin B6, (B-6) 100 MG Tab Take 1 tablet by mouth once daily.      rosuvastatin (CRESTOR) 20 MG tablet Take 20 mg by mouth once daily.       tobramycin-dexAMETHasone 0.3-0.1% (TOBRADEX) 0.3-0.1 % DrpS Place 1 drop into both eyes every 6 (six) hours.      triamcinolone acetonide 0.1% (KENALOG) 0.1 % cream Apply 1 g topically 2 (two) times daily.      [DISCONTINUED] phenylephrine-hard fat 0.25-88.7 % Supp Place 1 suppository rectally once daily.      AEROCHAMBER PLUS FLOW-VU       AMITIZA 8 mcg Cap Take 8 mcg by mouth 2 (two) times daily with meals.        "ARIPiprazole (ABILIFY) 10 MG Tab Take 10 mg by mouth once daily.      aspirin (ECOTRIN) 81 MG EC tablet Take 81 mg by mouth once daily.      budesonide 1 mg/2 mL NbSp Take 1 mg by nebulization every 4 to 6 hours as needed.      CONTOUR TEST STRIPS Strp 1 strip by Misc.(Non-Drug; Combo Route) route 2 (two) times a day.   1    escitalopram oxalate (LEXAPRO) 20 MG tablet Take 0.5 tablets (10 mg total) by mouth once daily. (Patient not taking: Reported on 3/2/2024)      hydrOXYzine HCL (ATARAX) 25 MG tablet Take 25 mg by mouth 3 (three) times daily as needed for Anxiety.      insulin NPH-insulin regular, 70/30, (NOVOLIN 70/30) 100 unit/mL (70-30) injection Inject 40 Units into the skin 2 (two) times daily before meals.       ipratropium-albuterol (COMBIVENT)  mcg/actuation inhaler Inhale 1 puff into the lungs every 4 (four) hours as needed for Shortness of Breath or Wheezing.      TRUE METRIX GLUCOSE METER Misc       TRUEPLUS LANCETS 28 gauge Misc 1 lancet by Misc.(Non-Drug; Combo Route) route 2 (two) times a day.       TRUEPLUS LANCETS 33 gauge Misc 1 lancet by Misc.(Non-Drug; Combo Route) route 2 (two) times a day.       TRUEPLUS PEN NEEDLE 31 gauge x 1/4" Ndle 1 pen by Misc.(Non-Drug; Combo Route) route 2 (two) times a day.       [DISCONTINUED] cinnamon bark 500 mg capsule Take 500 mg by mouth once daily.      [DISCONTINUED] DOCOSAHEXAENOIC ACID ORAL Take 1 tablet by mouth once daily.      [DISCONTINUED] docusate sodium (COLACE) 50 MG capsule Take 100 mg by mouth 2 (two) times daily.       [DISCONTINUED] miconazole nitrate (MONISTAT 3) 200 mg Supp Place 200 mg vaginally every evening.         Potential issues to be addressed PRIOR TO DISCHARGE  Patient reported not taking the following medications: (Amitiza, Abilify, Aspirin, Budesonide, Lexapro, Hydroxyzine, Novolin & Combivent). These medications remain on the home medication list. Please address accordingly.     Minerva Herring  EXT " 1923       Anemia

## 2024-03-02 NOTE — Clinical Note
Diagnosis: Pneumonia of both lungs due to infectious organism, unspecified part of lung [[id number]]   Future Attending Provider: SUKHI FLAHERTY [[id number]]   Admit to which facility:: [facility] [[id number]]   Reason for IP Medical Treatment  (Clinical interventions that can only be accomplished in the IP setting? ) :: need rx   I certify that Inpatient services for greater than or equal to 2 midnights are medically necessary:: Yes   Plans for Post-Acute care--if anticipated (pick the single best option):: A. No post acute care anticipated at this time   Special Needs:: No Special Needs [1]

## 2024-03-02 NOTE — SUBJECTIVE & OBJECTIVE
Past Medical History:   Diagnosis Date    Arthritis     Coronary artery disease     Diverticulitis     GERD (gastroesophageal reflux disease)     Hyperlipidemia     Hypertension        Past Surgical History:   Procedure Laterality Date    CARDIAC CATHETERIZATION      CARDIAC PACEMAKER PLACEMENT  2007    CARDIAC VALVE SURGERY      CHOLECYSTECTOMY      CORONARY ARTERY BYPASS GRAFT      HYSTERECTOMY      TOTAL KNEE ARTHROPLASTY Right     TOTAL KNEE ARTHROPLASTY Left        Review of patient's allergies indicates:   Allergen Reactions    Morphine Anxiety       No current facility-administered medications on file prior to encounter.     Current Outpatient Medications on File Prior to Encounter   Medication Sig    albuterol (VENTOLIN HFA) 90 mcg/actuation inhaler Inhale 1 puff into the lungs every 4 (four) hours as needed for Wheezing.     apixaban (ELIQUIS) 5 mg Tab Take 1 tablet (5 mg total) by mouth 2 (two) times daily.    ascorbic acid, vitamin C, 500 mg Cap Take 500 mg by mouth once daily.    BAQSIMI 3 mg/actuation Spry 1 Dose by Each Nostril route daily as needed.    calcium carbonate-vitamin D3 600 mg-5 mcg (200 unit) Cap Take 1 capsule by mouth once daily.    cyanocobalamin 500 MCG tablet Take 1,000 mcg by mouth once daily.    diclofenac sodium (VOLTAREN) 1 % Gel Apply 4 g topically 2 (two) times daily.    digoxin (LANOXIN) 125 mcg tablet Take 1 tablet (125 mcg total) by mouth once daily.    diltiaZEM (CARDIZEM CD) 120 MG Cp24 Take 1 capsule (120 mg total) by mouth once daily.    DULoxetine (CYMBALTA) 30 MG capsule Take 30 mg by mouth once daily.     fluticasone propionate (FLONASE) 50 mcg/actuation nasal spray 1 spray by Each Nostril route once daily.    furosemide (LASIX) 40 MG tablet Take 40 mg by mouth once daily.     glucose 4 GM chewable tablet Take 16 g by mouth as needed for Low blood sugar.    hydrocortisone 2.5 % cream Apply 1 application  topically as needed.    ibuprofen (ADVIL,MOTRIN) 200 MG tablet  Take 200 mg by mouth every 6 (six) hours as needed for Pain.    insulin NPH-insulin regular, 70/30, 100 unit/mL (70-30) injection Inject 80 Units into the skin 2 (two) times daily before meals.    levothyroxine (SYNTHROID, LEVOTHROID) 175 MCG tablet Take 175 mcg by mouth once daily.     magnesium oxide (MAG-OX) 250 mg Tab Take 400 mg by mouth once daily.     metoprolol succinate (TOPROL-XL) 100 MG 24 hr tablet Take 1 tablet (100 mg total) by mouth once daily. (Patient taking differently: Take 100 mg by mouth 2 (two) times daily.)    nitroGLYCERIN (NITROSTAT) 0.4 MG SL tablet Place 0.4 mg under the tongue every 5 (five) minutes as needed for Chest pain.    NOVOLOG MIX 70-30 FLEXPEN 100 unit/mL (70-30) InPn pen Inject into the skin 2 (two) times daily. 35 units if glucose <150  40 units if glucose >150    nystatin (MYCOSTATIN) cream Apply 100,000 Units topically 2 (two) times daily.    omega-3 fatty acids-fish oil 340-1,000 mg Cap Take 1 capsule by mouth once daily.    ondansetron (ZOFRAN-ODT) 4 MG TbDL Take 4 mg by mouth every 6 (six) hours as needed.    pantoprazole (PROTONIX) 20 MG tablet Take 20 mg by mouth once daily.     potassium gluconate 2.5 mEq Tab Take 3 tablets by mouth 2 (two) times a day.    pyridoxine, vitamin B6, (B-6) 100 MG Tab Take 1 tablet by mouth once daily.    rosuvastatin (CRESTOR) 20 MG tablet Take 20 mg by mouth once daily.     tobramycin-dexAMETHasone 0.3-0.1% (TOBRADEX) 0.3-0.1 % DrpS Place 1 drop into both eyes every 6 (six) hours.    triamcinolone acetonide 0.1% (KENALOG) 0.1 % cream Apply 1 g topically 2 (two) times daily.    [DISCONTINUED] phenylephrine-hard fat 0.25-88.7 % Supp Place 1 suppository rectally once daily.    AEROCHAMBER PLUS FLOW-VU     AMITIZA 8 mcg Cap Take 8 mcg by mouth 2 (two) times daily with meals.     ARIPiprazole (ABILIFY) 10 MG Tab Take 10 mg by mouth once daily.    aspirin (ECOTRIN) 81 MG EC tablet Take 81 mg by mouth once daily.    budesonide 1 mg/2 mL NbSp  "Take 1 mg by nebulization every 4 to 6 hours as needed.    CONTOUR TEST STRIPS Strp 1 strip by Misc.(Non-Drug; Combo Route) route 2 (two) times a day.     escitalopram oxalate (LEXAPRO) 20 MG tablet Take 0.5 tablets (10 mg total) by mouth once daily. (Patient not taking: Reported on 3/2/2024)    hydrOXYzine HCL (ATARAX) 25 MG tablet Take 25 mg by mouth 3 (three) times daily as needed for Anxiety.    insulin NPH-insulin regular, 70/30, (NOVOLIN 70/30) 100 unit/mL (70-30) injection Inject 40 Units into the skin 2 (two) times daily before meals.     ipratropium-albuterol (COMBIVENT)  mcg/actuation inhaler Inhale 1 puff into the lungs every 4 (four) hours as needed for Shortness of Breath or Wheezing.    TRUE METRIX GLUCOSE METER Misc     TRUEPLUS LANCETS 28 gauge Misc 1 lancet by Misc.(Non-Drug; Combo Route) route 2 (two) times a day.     TRUEPLUS LANCETS 33 gauge Misc 1 lancet by Misc.(Non-Drug; Combo Route) route 2 (two) times a day.     TRUEPLUS PEN NEEDLE 31 gauge x 1/4" Ndle 1 pen by Misc.(Non-Drug; Combo Route) route 2 (two) times a day.     [DISCONTINUED] cinnamon bark 500 mg capsule Take 500 mg by mouth once daily.    [DISCONTINUED] DOCOSAHEXAENOIC ACID ORAL Take 1 tablet by mouth once daily.    [DISCONTINUED] docusate sodium (COLACE) 50 MG capsule Take 100 mg by mouth 2 (two) times daily.     [DISCONTINUED] miconazole nitrate (MONISTAT 3) 200 mg Supp Place 200 mg vaginally every evening.     Family History       Problem Relation (Age of Onset)    Hypertension Mother          Tobacco Use    Smoking status: Former    Smokeless tobacco: Never   Substance and Sexual Activity    Alcohol use: No    Drug use: No    Sexual activity: Never     Review of Systems   Constitutional:  Negative for activity change and appetite change.   HENT:  Negative for congestion and dental problem.    Eyes:  Negative for discharge and itching.   Respiratory:  Positive for cough and shortness of breath.    Cardiovascular:  Negative " for chest pain.   Gastrointestinal:  Negative for abdominal distention and abdominal pain.   Endocrine: Negative for cold intolerance.   Genitourinary:  Negative for difficulty urinating and dysuria.   Musculoskeletal:  Negative for arthralgias and back pain.   Skin:  Negative for color change.   Neurological:  Negative for dizziness and facial asymmetry.   Hematological:  Negative for adenopathy.   Psychiatric/Behavioral:  Negative for agitation and behavioral problems.      Objective:     Vital Signs (Most Recent):  Temp: 97.5 °F (36.4 °C) (03/02/24 1515)  Pulse: 88 (03/02/24 1515)  Resp: (!) 33 (03/02/24 1515)  BP: (!) 183/87 (03/02/24 1429)  SpO2: (!) 93 % (03/02/24 1515) Vital Signs (24h Range):  Temp:  [97.5 °F (36.4 °C)-97.9 °F (36.6 °C)] 97.5 °F (36.4 °C)  Pulse:  [77-88] 88  Resp:  [18-38] 33  SpO2:  [82 %-94 %] 93 %  BP: (165-183)/(76-87) 183/87     Weight: 83.9 kg (184 lb 15.5 oz)  Body mass index is 36.12 kg/m².     Physical Exam  Vitals and nursing note reviewed.   Constitutional:       General: She is not in acute distress.  HENT:      Head: Atraumatic.      Right Ear: External ear normal.      Left Ear: External ear normal.      Nose: Nose normal.      Mouth/Throat:      Mouth: Mucous membranes are moist.   Eyes:      Extraocular Movements: Extraocular movements intact.   Cardiovascular:      Rate and Rhythm: Normal rate.   Pulmonary:      Effort: Pulmonary effort is normal.   Abdominal:      Palpations: Abdomen is soft.   Musculoskeletal:         General: Normal range of motion.      Cervical back: Normal range of motion.   Skin:     General: Skin is warm.   Neurological:      Mental Status: She is alert and oriented to person, place, and time.   Psychiatric:         Behavior: Behavior normal.                Significant Labs: All pertinent labs within the past 24 hours have been reviewed.  CBC:   Recent Labs   Lab 03/02/24  1141 03/02/24  1143   WBC 9.09  --    HGB 12.3  --    HCT 38.1 36      --      CMP:   Recent Labs   Lab 03/02/24  1141      K 4.5      CO2 26   *   BUN 12   CREATININE 1.1   CALCIUM 9.5   PROT 7.3   ALBUMIN 3.9   BILITOT 1.0   ALKPHOS 71   AST 24   ALT 14   ANIONGAP 8       Significant Imaging: I have reviewed all pertinent imaging results/findings within the past 24 hours.

## 2024-03-02 NOTE — ASSESSMENT & PLAN NOTE
Patient with Hypoxic Respiratory failure which is Acute.  she is not on home oxygen. Supplemental oxygen was provided and noted-      .   Signs/symptoms of respiratory failure include- tachypnea, increased work of breathing, respiratory distress, and use of accessory muscles. Contributing diagnoses includes - CHF Labs and images were reviewed. Patient Has recent ABG, which has been reviewed. Will treat underlying causes and adjust management of respiratory failure as follows-       Maintain aggressive iv diuresis  Wean off oxygen  Can be started on abx if indicated  Consulted Pulmonology MD

## 2024-03-02 NOTE — ASSESSMENT & PLAN NOTE
Creatine stable for now. BMP reviewed- noted Estimated Creatinine Clearance: 34.6 mL/min (based on SCr of 1.1 mg/dL). according to latest data. Based on current GFR, CKD stage is stage 3 - GFR 30-59.  Monitor UOP and serial BMP and adjust therapy as needed. Renally dose meds. Avoid nephrotoxic medications and procedures.

## 2024-03-02 NOTE — CONSULTS
Pulmonary/Critical Care Consult      PATIENT NAME: Milly Lee  MRN: [id number]  TODAY'S DATE: 2024  ADMIT DATE: 3/2/2024  AGE: 88 y.o. : 1935    CONSULT REQUESTED BY: Brad Gabriel MD    REASON FOR CONSULT:   Heart failure exacerbation    HISTORY OF PRESENT ILLNESS   Milly Lee is a 88 y.o. female with a PMH of HFpEF, PPM, CABG, TAVR, HTN, and GERD on whom we have been consulted for acute hypoxic respiratory failure.    SMOKING HISTORY  No significant smoking history.  (States she smoked for 6 months as a teenager.)    REVIEW OF SYSTEMS  Shortness of breath and leg swelling.    ALLERGIES  Review of patient's allergies indicates:   Allergen Reactions    Morphine Anxiety       INPATIENT SCHEDULED MEDICATIONS   apixaban  5 mg Oral BID    aspirin  81 mg Oral Daily    budesonide  0.5 mg Nebulization Q12H    digoxin  125 mcg Oral Daily    DULoxetine  30 mg Oral Daily    famotidine  20 mg Oral Daily    furosemide (LASIX) injection  40 mg Intravenous BID    insulin NPH-insulin regular (70/30)  40 Units Subcutaneous BID AC    ipratropium  0.5 mg Nebulization Q8H    levalbuterol  1.25 mg Nebulization Q8H    magnesium oxide  400 mg Oral Daily    metoprolol succinate  100 mg Oral Daily         MEDICAL AND SURGICAL HISTORY  Past Medical History:   Diagnosis Date    Arthritis     Coronary artery disease     Diverticulitis     GERD (gastroesophageal reflux disease)     Hyperlipidemia     Hypertension      Past Surgical History:   Procedure Laterality Date    CARDIAC CATHETERIZATION      CARDIAC PACEMAKER PLACEMENT      CARDIAC VALVE SURGERY      CHOLECYSTECTOMY      CORONARY ARTERY BYPASS GRAFT      HYSTERECTOMY      TOTAL KNEE ARTHROPLASTY Right     TOTAL KNEE ARTHROPLASTY Left        ALCOHOL, TOBACCO AND DRUG USE  Social History     Tobacco Use   Smoking Status Former   Smokeless Tobacco Never     Social History     Substance and Sexual Activity   Alcohol Use No     Social History  "    Substance and Sexual Activity   Drug Use No       FAMILY HISTORY  Family History   Problem Relation Age of Onset    Hypertension Mother        VITAL SIGNS (MOST RECENT)  Temp: 97.9 °F (36.6 °C) (03/02/24 1132)  Pulse: 77 (03/02/24 1148)  Resp: 18 (03/02/24 1131)  BP: (!) 165/76 (03/02/24 1132)  SpO2: (!) 94 % (03/02/24 1148)    INTAKE AND OUTPUT (LAST 24 HOURS):No intake or output data in the 24 hours ending 03/02/24 1311    WEIGHT  Wt Readings from Last 1 Encounters:   03/02/24 79.8 kg (176 lb)       PHYSICAL EXAM  GENERAL: A&O. NAD.  HEENT: Extraocular movements intact. Pharynx moist.  NECK: Supple. No JVD or hepatojugular reflux.  HEART: Normal rate and regular rhythm. No murmur or gallop auscultated.  LUNGS: Bibasilar crackles. Diminished breath sounds at bilateral bases.  ABDOMEN: Soft, non-tender, non-distended, no masses palpated.  EXTREMITIES: Normal muscle tone and joint movement, no cyanosis or clubbing.   LYMPHATICS: No adenopathy palpated, no edema.  SKIN: Dry, intact, no lesions.   NEURO: No gross motor or cognitive deficit.  PSYCH: Appropriate affect    ACUTE PHASE REACTANT (LAST 24 HOURS)  No results for input(s): "FERRITIN", "CRP", "LDH", "DDIMER" in the last 24 hours.    CBC LAST (LAST 24 HOURS)  Recent Labs   Lab 03/02/24  1141 03/02/24  1143   WBC 9.09  --    RBC 3.97*  --    HGB 12.3  --    HCT 38.1 36   MCV 96  --    MCH 31.0  --    MCHC 32.3  --    RDW 16.9*  --      --    MPV 11.2  --    GRAN 69.8  6.3  --    LYMPH 17.4*  1.6  --    MONO 10.2  0.9  --    BASO 0.11  --    NRBC 0  --        CHEMISTRY LAST (LAST 24 HOURS)  Recent Labs   Lab 03/02/24  1141 03/02/24  1143     --    K 4.5  --      --    CO2 26  --    ANIONGAP 8  --    BUN 12  --    CREATININE 1.1  --    *  --    CALCIUM 9.5  --    PH  --  7.431   MG 1.9  --    ALBUMIN 3.9  --    PROT 7.3  --    ALKPHOS 71  --    ALT 14  --    AST 24  --    BILITOT 1.0  --        COAGULATION LAST (LAST 24 " HOURS)  Recent Labs   Lab 03/02/24  1141   INR 1.1       CARDIAC PROFILE (LAST 24 HOURS)  Recent Labs   Lab 03/02/24  1141   *   CPK <10*       LAST 7 DAYS MICROBIOLOGY   Microbiology Results (last 7 days)       Procedure Component Value Units Date/Time    Blood culture x two cultures. Draw prior to antibiotics. [[id number]]     Order Status: Sent Specimen: Blood     Blood culture x two cultures. Draw prior to antibiotics. [[id number]]     Order Status: Sent Specimen: Blood             MOST RECENT IMAGING  X-Ray Chest AP Portable  HISTORY: Sepsis    FINDINGS: Portable chest radiograph at 1139 hours compared to 11/19/2023 shows dual lead left subclavian CCD unchanged in position, with median sternotomy wires and changes of transarterial aortic valvuloplasty. The cardiac silhouette is enlarged, stable in size, with normal pulmonary vascularity and scattered aortic vascular calcifications.    There are scattered bilateral lower lung reticulonodular and acinar opacities, with blunting of the costophrenic angles. The upper lungs are clear, with no evidence of interstitial pulmonary edema or pneumothorax.    IMPRESSION: Bilateral lower lung opacities suggesting multifocal pneumonia, with small pleural effusions not excluded. Follow-up PA and lateral chest radiograph in 4-6 weeks after appropriate therapy is recommended to document complete resolution.    Electronically signed by:  Kenn Guerrier MD  03/02/2024 12:01 PM [facility] Workstation: [id number]      CURRENT VISIT EKG  Results for orders placed or performed during the hospital encounter of 03/02/24   EKG 12-lead   Result Value Ref Range    QRS Duration 94 ms    OHS QTC Calculation 439 ms    Narrative    Test Reason : R06.00,    Vent. Rate : 077 BPM     Atrial Rate : 068 BPM     P-R Int : 000 ms          QRS Dur : 094 ms      QT Int : 388 ms       P-R-T Axes : 000 -30 199 degrees     QTc Int : 439 ms    Undetermined rhythm  Left axis deviation  Septal infarct ,age  undetermined  Marked ST abnormality, possible lateral subendocardial injury  Abnormal ECG  When compared with ECG of 19-NOV-2023 18:05,  Current undetermined rhythm precludes rhythm comparison, needs review    Referred By: [email or web address]   SELF           Confirmed By:        ECHOCARDIOGRAM RESULTS  No results found for this or any previous visit.        RESPIRATORY SUPPORT              LAST ARTERIAL BLOOD GAS  ABG  Recent Labs   Lab 03/02/24  1143   PH 7.431   PO2 58*   PCO2 32.9*   HCO3 21.9*   BE -2     CXR 3/2/24: Diffuse, hazy, bilateral airspace and interstitial opacities with basilar predominance consistent with pulmonary edema. Cardiomegaly. Sternotomy wires.    IMPRESSION AND PLAN  Milly Lee is a 88 y.o. female with a PMH of HFpEF, PPM, CABG, TAVR, HTN, and GERD on whom we have been consulted for acute hypoxic respiratory failure.    #Heart failure exacerbation  - diurese as tolerated  - GDMT  - supplemental O2 as needed  - Abx d/c'ed, as there are no signs of infection  - TTE ordered    #Lactic acidosis, likely due to poor systemic perfusion from CHF  Beta-agonists may also cause lactic acidosis. She does not likely have obstructive lung disease, so they are not indicated anyway.  - d/c beta agonists and other bronchodilators  - manage CHF as above  - trend lactate    #Hypercapnia, likely secondary to OHS  #TRISTEN  - stop bronchodilators  - BiPAP with sleep    Discussed with hospitalist. I have reviewed the patient's most recent CBC and serum chemistry, as well as the most recent chest x-ray and/or chest CT. My interpretation of the chest imaging is as above.The patient is at high risk of morbidity or death and should be admitted to the hospital.    Uriel Grant MD  [facility]  Department of Pulmonology  Date of Service: 03/02/2024  1:11 PM

## 2024-03-02 NOTE — NURSING
Talked to Dr. Gabriel, said we will hold antibiotics at this time. Okay to transfer out of ICU if bed is needed.

## 2024-03-02 NOTE — H&P
"  Formerly [facility] - Emergency Dept  Hospital Medicine  History & Physical    Patient Name: Milly Lee  MRN: [id number]  Patient Class: IP- Inpatient  Admission Date: 3/2/2024  Attending Physician: Brad Gabriel MD   Primary Care Provider: Michela Christian MD         Patient information was obtained from patient, past medical records, ER records, and ER MD .     Subjective:     Principal Problem:Acute hypoxemic respiratory failure    Chief Complaint:   Chief Complaint   Patient presents with    Shortness of Breath     SOB "for a while" per patient         HPI: 88 year old pt getting admitted with a cute hypoxic resp failure and acute CHF flare  History is limited because pt is a very poor historian  Per pt she started having shortness of breath since yesterday  Later she noticed SOB on exertion and today AM she had SOB on rest  She came to hospital and got admitted  Denies fever/chest pain or other issues   Pt was admitted to ICU because of resp distress and in need of HF O2 in ER  After diuresis pt felt better  Case discussed with Dr Grant and decision was made to hold any antibiotics at this point      Pt had CABG in 2009  Pt had LHC in Feb 2018 with no PCI  Pt had Normal stress test on November 2023  Pt had TAVR in March 2018  Pt had EGD with dilatation of Esophagus in August 2023  She last saw her Cardiologist :Dr Benitez in [place] clinic on August 2023    Past Medical History:   Diagnosis Date    Arthritis     Coronary artery disease     Diverticulitis     GERD (gastroesophageal reflux disease)     Hyperlipidemia     Hypertension        Past Surgical History:   Procedure Laterality Date    CARDIAC CATHETERIZATION      CARDIAC PACEMAKER PLACEMENT  2007    CARDIAC VALVE SURGERY      CHOLECYSTECTOMY      CORONARY ARTERY BYPASS GRAFT      HYSTERECTOMY      TOTAL KNEE ARTHROPLASTY Right     TOTAL KNEE ARTHROPLASTY Left        Review of patient's allergies indicates:   Allergen Reactions    " Morphine Anxiety       No current facility-administered medications on file prior to encounter.     Current Outpatient Medications on File Prior to Encounter   Medication Sig    albuterol (VENTOLIN HFA) 90 mcg/actuation inhaler Inhale 1 puff into the lungs every 4 (four) hours as needed for Wheezing.     apixaban (ELIQUIS) 5 mg Tab Take 1 tablet (5 mg total) by mouth 2 (two) times daily.    ascorbic acid, vitamin C, 500 mg Cap Take 500 mg by mouth once daily.    BAQSIMI 3 mg/actuation Spry 1 Dose by Each Nostril route daily as needed.    calcium carbonate-vitamin D3 600 mg-5 mcg (200 unit) Cap Take 1 capsule by mouth once daily.    cyanocobalamin 500 MCG tablet Take 1,000 mcg by mouth once daily.    diclofenac sodium (VOLTAREN) 1 % Gel Apply 4 g topically 2 (two) times daily.    digoxin (LANOXIN) 125 mcg tablet Take 1 tablet (125 mcg total) by mouth once daily.    diltiaZEM (CARDIZEM CD) 120 MG Cp24 Take 1 capsule (120 mg total) by mouth once daily.    DULoxetine (CYMBALTA) 30 MG capsule Take 30 mg by mouth once daily.     fluticasone propionate (FLONASE) 50 mcg/actuation nasal spray 1 spray by Each Nostril route once daily.    furosemide (LASIX) 40 MG tablet Take 40 mg by mouth once daily.     glucose 4 GM chewable tablet Take 16 g by mouth as needed for Low blood sugar.    hydrocortisone 2.5 % cream Apply 1 application  topically as needed.    ibuprofen (ADVIL,MOTRIN) 200 MG tablet Take 200 mg by mouth every 6 (six) hours as needed for Pain.    insulin NPH-insulin regular, 70/30, 100 unit/mL (70-30) injection Inject 80 Units into the skin 2 (two) times daily before meals.    levothyroxine (SYNTHROID, LEVOTHROID) 175 MCG tablet Take 175 mcg by mouth once daily.     magnesium oxide (MAG-OX) 250 mg Tab Take 400 mg by mouth once daily.     metoprolol succinate (TOPROL-XL) 100 MG 24 hr tablet Take 1 tablet (100 mg total) by mouth once daily. (Patient taking differently: Take 100 mg by mouth 2 (two) times daily.)     nitroGLYCERIN (NITROSTAT) 0.4 MG SL tablet Place 0.4 mg under the tongue every 5 (five) minutes as needed for Chest pain.    NOVOLOG MIX 70-30 FLEXPEN 100 unit/mL (70-30) InPn pen Inject into the skin 2 (two) times daily. 35 units if glucose <150  40 units if glucose >150    nystatin (MYCOSTATIN) cream Apply 100,000 Units topically 2 (two) times daily.    omega-3 fatty acids-fish oil 340-1,000 mg Cap Take 1 capsule by mouth once daily.    ondansetron (ZOFRAN-ODT) 4 MG TbDL Take 4 mg by mouth every 6 (six) hours as needed.    pantoprazole (PROTONIX) 20 MG tablet Take 20 mg by mouth once daily.     potassium gluconate 2.5 mEq Tab Take 3 tablets by mouth 2 (two) times a day.    pyridoxine, vitamin B6, (B-6) 100 MG Tab Take 1 tablet by mouth once daily.    rosuvastatin (CRESTOR) 20 MG tablet Take 20 mg by mouth once daily.     tobramycin-dexAMETHasone 0.3-0.1% (TOBRADEX) 0.3-0.1 % DrpS Place 1 drop into both eyes every 6 (six) hours.    triamcinolone acetonide 0.1% (KENALOG) 0.1 % cream Apply 1 g topically 2 (two) times daily.    [DISCONTINUED] phenylephrine-hard fat 0.25-88.7 % Supp Place 1 suppository rectally once daily.    AEROCHAMBER PLUS FLOW-VU     AMITIZA 8 mcg Cap Take 8 mcg by mouth 2 (two) times daily with meals.     ARIPiprazole (ABILIFY) 10 MG Tab Take 10 mg by mouth once daily.    aspirin (ECOTRIN) 81 MG EC tablet Take 81 mg by mouth once daily.    budesonide 1 mg/2 mL NbSp Take 1 mg by nebulization every 4 to 6 hours as needed.    CONTOUR TEST STRIPS Strp 1 strip by Misc.(Non-Drug; Combo Route) route 2 (two) times a day.     escitalopram oxalate (LEXAPRO) 20 MG tablet Take 0.5 tablets (10 mg total) by mouth once daily. (Patient not taking: Reported on 3/2/2024)    hydrOXYzine HCL (ATARAX) 25 MG tablet Take 25 mg by mouth 3 (three) times daily as needed for Anxiety.    insulin NPH-insulin regular, 70/30, (NOVOLIN 70/30) 100 unit/mL (70-30) injection Inject 40 Units into the skin 2 (two) times daily before  "meals.     ipratropium-albuterol (COMBIVENT)  mcg/actuation inhaler Inhale 1 puff into the lungs every 4 (four) hours as needed for Shortness of Breath or Wheezing.    TRUE METRIX GLUCOSE METER Misc     TRUEPLUS LANCETS 28 gauge Misc 1 lancet by Misc.(Non-Drug; Combo Route) route 2 (two) times a day.     TRUEPLUS LANCETS 33 gauge Misc 1 lancet by Misc.(Non-Drug; Combo Route) route 2 (two) times a day.     TRUEPLUS PEN NEEDLE 31 gauge x 1/4" Ndle 1 pen by Misc.(Non-Drug; Combo Route) route 2 (two) times a day.     [DISCONTINUED] cinnamon bark 500 mg capsule Take 500 mg by mouth once daily.    [DISCONTINUED] DOCOSAHEXAENOIC ACID ORAL Take 1 tablet by mouth once daily.    [DISCONTINUED] docusate sodium (COLACE) 50 MG capsule Take 100 mg by mouth 2 (two) times daily.     [DISCONTINUED] miconazole nitrate (MONISTAT 3) 200 mg Supp Place 200 mg vaginally every evening.     Family History       Problem Relation (Age of Onset)    Hypertension Mother          Tobacco Use    Smoking status: Former    Smokeless tobacco: Never   Substance and Sexual Activity    Alcohol use: No    Drug use: No    Sexual activity: Never     Review of Systems   Constitutional:  Negative for activity change and appetite change.   HENT:  Negative for congestion and dental problem.    Eyes:  Negative for discharge and itching.   Respiratory:  Positive for cough and shortness of breath.    Cardiovascular:  Negative for chest pain.   Gastrointestinal:  Negative for abdominal distention and abdominal pain.   Endocrine: Negative for cold intolerance.   Genitourinary:  Negative for difficulty urinating and dysuria.   Musculoskeletal:  Negative for arthralgias and back pain.   Skin:  Negative for color change.   Neurological:  Negative for dizziness and facial asymmetry.   Hematological:  Negative for adenopathy.   Psychiatric/Behavioral:  Negative for agitation and behavioral problems.      Objective:     Vital Signs (Most Recent):  Temp: 97.5 °F " (36.4 °C) (03/02/24 1515)  Pulse: 88 (03/02/24 1515)  Resp: (!) 33 (03/02/24 1515)  BP: (!) 183/87 (03/02/24 1429)  SpO2: (!) 93 % (03/02/24 1515) Vital Signs (24h Range):  Temp:  [97.5 °F (36.4 °C)-97.9 °F (36.6 °C)] 97.5 °F (36.4 °C)  Pulse:  [77-88] 88  Resp:  [18-38] 33  SpO2:  [82 %-94 %] 93 %  BP: (165-183)/(76-87) 183/87     Weight: 83.9 kg (184 lb 15.5 oz)  Body mass index is 36.12 kg/m².     Physical Exam  Vitals and nursing note reviewed.   Constitutional:       General: She is not in acute distress.  HENT:      Head: Atraumatic.      Right Ear: External ear normal.      Left Ear: External ear normal.      Nose: Nose normal.      Mouth/Throat:      Mouth: Mucous membranes are moist.   Eyes:      Extraocular Movements: Extraocular movements intact.   Cardiovascular:      Rate and Rhythm: Normal rate.   Pulmonary:      Effort: Pulmonary effort is normal.   Abdominal:      Palpations: Abdomen is soft.   Musculoskeletal:         General: Normal range of motion.      Cervical back: Normal range of motion.   Skin:     General: Skin is warm.   Neurological:      Mental Status: She is alert and oriented to person, place, and time.   Psychiatric:         Behavior: Behavior normal.                Significant Labs: All pertinent labs within the past 24 hours have been reviewed.  CBC:   Recent Labs   Lab 03/02/24  1141 03/02/24  1143   WBC 9.09  --    HGB 12.3  --    HCT 38.1 36     --      CMP:   Recent Labs   Lab 03/02/24  1141      K 4.5      CO2 26   *   BUN 12   CREATININE 1.1   CALCIUM 9.5   PROT 7.3   ALBUMIN 3.9   BILITOT 1.0   ALKPHOS 71   AST 24   ALT 14   ANIONGAP 8       Significant Imaging: I have reviewed all pertinent imaging results/findings within the past 24 hours.    Assessment/Plan:     * Acute hypoxemic respiratory failure  Patient with Hypoxic Respiratory failure which is Acute.  she is not on home oxygen. Supplemental oxygen was provided and noted-      .    Signs/symptoms of respiratory failure include- tachypnea, increased work of breathing, respiratory distress, and use of accessory muscles. Contributing diagnoses includes - CHF Labs and images were reviewed. Patient Has recent ABG, which has been reviewed. Will treat underlying causes and adjust management of respiratory failure as follows-       Maintain aggressive iv diuresis  Wean off oxygen  Can be started on abx if indicated  Consulted Pulmonology MD    Acute on chronic diastolic (congestive) heart failure  Maintain iv lasix 80 mg BID  If in need can be started on lasix drip  ECHO ordered by Pulmonology MD  Follow up with Cardiologist in clinic    Longstanding persistent atrial fibrillation  Aware  Maintain home medications    CKD (chronic kidney disease), stage III  Creatine stable for now. BMP reviewed- noted Estimated Creatinine Clearance: 34.6 mL/min (based on SCr of 1.1 mg/dL). according to latest data. Based on current GFR, CKD stage is stage 3 - GFR 30-59.  Monitor UOP and serial BMP and adjust therapy as needed. Renally dose meds. Avoid nephrotoxic medications and procedures.    Chronic diastolic heart failure  History of CHF  Aware       CAD (coronary artery disease)  Aware  As per HPI    Ascending aortic aneurysm  Mot a candidate for Sx as per records  Aggressive BP control needed      Type 2 diabetes mellitus with stage 3 chronic kidney disease, with long-term current use of insulin  Maintain SSI and other insulin regime    Cardiac pacemaker in situ  Aware       H/O prosthetic aortic valve replacement  Aware   Bovine valve         VTE Risk Mitigation (From admission, onward)           Ordered     apixaban tablet 5 mg  2 times daily         03/02/24 1254     IP VTE HIGH RISK PATIENT  Once         03/02/24 1254     Place sequential compression device  Until discontinued         03/02/24 1254                                    Brad Gabriel MD  Department of Hospital Medicine  [facility] -  Emergency Dept

## 2024-03-02 NOTE — CARE UPDATE
"   03/02/24 1435   Patient Assessment/Suction   Level of Consciousness (AVPU) alert  (EATING FROM LUNCH TRAY)   Respiratory Effort Normal;Unlabored   Expansion/Accessory Muscles/Retractions no use of accessory muscles   All Lung Fields Breath Sounds diminished   LLL Breath Sounds coarse   RLL Breath Sounds coarse   Rhythm/Pattern, Respiratory unlabored   PRE-TX-O2   Device (Oxygen Therapy) high flow nasal cannula   $ Is the patient on Low Flow Oxygen? Yes   Flow (L/min) 15   Pulse Oximetry Type Continuous   $ Pulse Oximetry - Multiple Charge Pulse Oximetry - Multiple   Respiratory Evaluation   $ Care Plan Tech Time 15 min   $ Eval/Re-eval Charges Evaluation   Evaluation For New Orders   Home Oxygen   Has Home Oxygen? No   Oxygen Care Plan   Oxygen Care Plan Per Protocol   SPO2 Goal (%) MD order   Rationale Shortness of Breath;SpO2 is <92% (Non-cardiac Pt.);PaO2 <60torr   Bronchodilator Care Plan   Bronchodilator Care Plan Aerosol   Aerosol Meds w/ frequency Atrovent(Iprotropium Bromide) 500mcg Q 8Hr;Xopenex(Levalbuterol HCL) 0.31mg Q 8Hr   Rationale Wheezing;Chest "tightness" with breathing       "

## 2024-03-03 ENCOUNTER — CLINICAL SUPPORT (OUTPATIENT)
Dept: CARDIOLOGY | Facility: HOSPITAL | Age: 89
DRG: 291 | End: 2024-03-03
Attending: INTERNAL MEDICINE
Payer: MEDICARE

## 2024-03-03 VITALS — DIASTOLIC BLOOD PRESSURE: 86 MMHG | SYSTOLIC BLOOD PRESSURE: 187 MMHG

## 2024-03-03 PROBLEM — I50.32 CHRONIC DIASTOLIC HEART FAILURE: Status: RESOLVED | Noted: 2018-03-19 | Resolved: 2024-03-03

## 2024-03-03 PROBLEM — I71.21 ASCENDING AORTIC ANEURYSM: Status: ACTIVE | Noted: 2018-02-22

## 2024-03-03 LAB
ALBUMIN SERPL BCP-MCNC: 3.9 G/DL (ref 3.5–5.2)
ALP SERPL-CCNC: 65 U/L (ref 55–135)
ALT SERPL W/O P-5'-P-CCNC: 11 U/L (ref 10–44)
ANION GAP SERPL CALC-SCNC: 8 MMOL/L (ref 8–16)
ASCENDING AORTA: 4.5 CM
AST SERPL-CCNC: 23 U/L (ref 10–40)
AV INDEX (PROSTH): 0.26
AV MEAN GRADIENT: 31 MMHG
AV PEAK GRADIENT: 56 MMHG
AV VALVE AREA BY VELOCITY RATIO: 0.95 CM²
AV VALVE AREA: 0.82 CM²
AV VELOCITY RATIO: 0.3
BASOPHILS # BLD AUTO: 0.1 K/UL (ref 0–0.2)
BASOPHILS NFR BLD: 1.2 % (ref 0–1.9)
BILIRUB SERPL-MCNC: 0.8 MG/DL (ref 0.1–1)
BSA FOR ECHO PROCEDURE: 1.88 M2
BUN SERPL-MCNC: 11 MG/DL (ref 8–23)
CALCIUM SERPL-MCNC: 9.4 MG/DL (ref 8.7–10.5)
CHLORIDE SERPL-SCNC: 101 MMOL/L (ref 95–110)
CO2 SERPL-SCNC: 29 MMOL/L (ref 23–29)
CREAT SERPL-MCNC: 1 MG/DL (ref 0.5–1.4)
CV ECHO LV RWT: 0.96 CM
DIFFERENTIAL METHOD BLD: ABNORMAL
DOP CALC AO PEAK VEL: 3.74 M/S
DOP CALC AO VTI: 72.2 CM
DOP CALC LVOT AREA: 3.1 CM2
DOP CALC LVOT DIAMETER: 2 CM
DOP CALC LVOT PEAK VEL: 1.13 M/S
DOP CALC LVOT STROKE VOLUME: 59.03 CM3
DOP CALC MV VTI: 22.1 CM
DOP CALCLVOT PEAK VEL VTI: 18.8 CM
E WAVE DECELERATION TIME: 234 MSEC
E/E' RATIO: 14.31 M/S
ECHO LV POSTERIOR WALL: 1.65 CM (ref 0.6–1.1)
EOSINOPHIL # BLD AUTO: 0.2 K/UL (ref 0–0.5)
EOSINOPHIL NFR BLD: 2.4 % (ref 0–8)
ERYTHROCYTE [DISTWIDTH] IN BLOOD BY AUTOMATED COUNT: 16.5 % (ref 11.5–14.5)
EST. GFR  (NO RACE VARIABLE): 54.5 ML/MIN/1.73 M^2
FRACTIONAL SHORTENING: 34 % (ref 28–44)
GLUCOSE SERPL-MCNC: 145 MG/DL (ref 70–110)
GLUCOSE SERPL-MCNC: 175 MG/DL (ref 70–110)
GLUCOSE SERPL-MCNC: 304 MG/DL (ref 70–110)
GLUCOSE SERPL-MCNC: 73 MG/DL (ref 70–110)
GLUCOSE SERPL-MCNC: 80 MG/DL (ref 70–110)
HCT VFR BLD AUTO: 38.7 % (ref 37–48.5)
HGB BLD-MCNC: 12.6 G/DL (ref 12–16)
IMM GRANULOCYTES # BLD AUTO: 0.02 K/UL (ref 0–0.04)
IMM GRANULOCYTES NFR BLD AUTO: 0.2 % (ref 0–0.5)
INTERVENTRICULAR SEPTUM: 1.28 CM (ref 0.6–1.1)
IVC DIAMETER: 1.35 CM
LACTATE SERPL-SCNC: 2.2 MMOL/L (ref 0.5–1.9)
LEFT ATRIUM SIZE: 2.8 CM
LEFT ATRIUM VOLUME INDEX MOD: 20.1 ML/M2
LEFT ATRIUM VOLUME MOD: 36.3 CM3
LEFT INTERNAL DIMENSION IN SYSTOLE: 2.27 CM (ref 2.1–4)
LEFT VENTRICLE DIASTOLIC VOLUME INDEX: 26.57 ML/M2
LEFT VENTRICLE DIASTOLIC VOLUME: 48.1 ML
LEFT VENTRICLE MASS INDEX: 100 G/M2
LEFT VENTRICLE SYSTOLIC VOLUME INDEX: 9.7 ML/M2
LEFT VENTRICLE SYSTOLIC VOLUME: 17.5 ML
LEFT VENTRICULAR INTERNAL DIMENSION IN DIASTOLE: 3.42 CM (ref 3.5–6)
LEFT VENTRICULAR MASS: 180.35 G
LV LATERAL E/E' RATIO: 11.63 M/S
LV SEPTAL E/E' RATIO: 18.6 M/S
LVOT MG: 3 MMHG
LVOT MV: 0.72 CM/S
LYMPHOCYTES # BLD AUTO: 1.4 K/UL (ref 1–4.8)
LYMPHOCYTES NFR BLD: 16.3 % (ref 18–48)
MAGNESIUM SERPL-MCNC: 1.8 MG/DL (ref 1.6–2.6)
MCH RBC QN AUTO: 31.3 PG (ref 27–31)
MCHC RBC AUTO-ENTMCNC: 32.6 G/DL (ref 32–36)
MCV RBC AUTO: 96 FL (ref 82–98)
MONOCYTES # BLD AUTO: 1.2 K/UL (ref 0.3–1)
MONOCYTES NFR BLD: 14.6 % (ref 4–15)
MV MEAN GRADIENT: 2 MMHG
MV PEAK E VEL: 0.93 M/S
MV PEAK GRADIENT: 4 MMHG
MV STENOSIS PRESSURE HALF TIME: 68 MS
MV VALVE AREA BY CONTINUITY EQUATION: 2.67 CM2
MV VALVE AREA P 1/2 METHOD: 3.24 CM2
NEUTROPHILS # BLD AUTO: 5.4 K/UL (ref 1.8–7.7)
NEUTROPHILS NFR BLD: 65.3 % (ref 38–73)
NRBC BLD-RTO: 0 /100 WBC
OHS LV EJECTION FRACTION SIMPSONS BIPLANE MOD: 60 %
PISA TR MAX VEL: 2.02 M/S
PLATELET # BLD AUTO: 146 K/UL (ref 150–450)
PMV BLD AUTO: 11.1 FL (ref 9.2–12.9)
POTASSIUM SERPL-SCNC: 3.8 MMOL/L (ref 3.5–5.1)
PROT SERPL-MCNC: 7.2 G/DL (ref 6–8.4)
PV MV: 0.5 M/S
PV PEAK GRADIENT: 2 MMHG
PV PEAK VELOCITY: 0.71 M/S
RA PRESSURE ESTIMATED: 3 MMHG
RBC # BLD AUTO: 4.03 M/UL (ref 4–5.4)
RV TB RVSP: 5 MMHG
RV TISSUE DOPPLER FREE WALL SYSTOLIC VELOCITY 1 (APICAL 4 CHAMBER VIEW): 9.9 CM/S
SODIUM SERPL-SCNC: 138 MMOL/L (ref 136–145)
TDI LATERAL: 0.08 M/S
TDI SEPTAL: 0.05 M/S
TDI: 0.07 M/S
TR MAX PG: 16 MMHG
TV REST PULMONARY ARTERY PRESSURE: 19 MMHG
WBC # BLD AUTO: 8.29 K/UL (ref 3.9–12.7)
Z-SCORE OF LEFT VENTRICULAR DIMENSION IN END DIASTOLE: -3.81
Z-SCORE OF LEFT VENTRICULAR DIMENSION IN END SYSTOLE: -2.45

## 2024-03-03 PROCEDURE — 82962 GLUCOSE BLOOD TEST: CPT

## 2024-03-03 PROCEDURE — 99233 SBSQ HOSP IP/OBS HIGH 50: CPT | Mod: ,,, | Performed by: INTERNAL MEDICINE

## 2024-03-03 PROCEDURE — 94761 N-INVAS EAR/PLS OXIMETRY MLT: CPT

## 2024-03-03 PROCEDURE — 85025 COMPLETE CBC W/AUTO DIFF WBC: CPT | Performed by: INTERNAL MEDICINE

## 2024-03-03 PROCEDURE — 27000221 HC OXYGEN, UP TO 24 HOURS

## 2024-03-03 PROCEDURE — 25000242 PHARM REV CODE 250 ALT 637 W/ HCPCS: Performed by: INTERNAL MEDICINE

## 2024-03-03 PROCEDURE — 94660 CPAP INITIATION&MGMT: CPT

## 2024-03-03 PROCEDURE — 80053 COMPREHEN METABOLIC PANEL: CPT | Performed by: INTERNAL MEDICINE

## 2024-03-03 PROCEDURE — 12000002 HC ACUTE/MED SURGE SEMI-PRIVATE ROOM

## 2024-03-03 PROCEDURE — 83735 ASSAY OF MAGNESIUM: CPT | Performed by: INTERNAL MEDICINE

## 2024-03-03 PROCEDURE — 93306 TTE W/DOPPLER COMPLETE: CPT

## 2024-03-03 PROCEDURE — 27100171 HC OXYGEN HIGH FLOW UP TO 24 HOURS

## 2024-03-03 PROCEDURE — 94640 AIRWAY INHALATION TREATMENT: CPT

## 2024-03-03 PROCEDURE — 99900035 HC TECH TIME PER 15 MIN (STAT)

## 2024-03-03 PROCEDURE — 93306 TTE W/DOPPLER COMPLETE: CPT | Mod: 26,,, | Performed by: INTERNAL MEDICINE

## 2024-03-03 PROCEDURE — 83605 ASSAY OF LACTIC ACID: CPT | Performed by: INTERNAL MEDICINE

## 2024-03-03 PROCEDURE — 25000003 PHARM REV CODE 250: Performed by: INTERNAL MEDICINE

## 2024-03-03 PROCEDURE — 36415 COLL VENOUS BLD VENIPUNCTURE: CPT | Performed by: INTERNAL MEDICINE

## 2024-03-03 PROCEDURE — 63600175 PHARM REV CODE 636 W HCPCS: Mod: UD | Performed by: INTERNAL MEDICINE

## 2024-03-03 RX ORDER — HYDRALAZINE HYDROCHLORIDE 20 MG/ML
10 INJECTION INTRAMUSCULAR; INTRAVENOUS EVERY 6 HOURS PRN
Status: DISCONTINUED | OUTPATIENT
Start: 2024-03-03 | End: 2024-03-06 | Stop reason: HOSPADM

## 2024-03-03 RX ADMIN — INSULIN ASPART 2 UNITS: 100 INJECTION, SOLUTION INTRAVENOUS; SUBCUTANEOUS at 11:03

## 2024-03-03 RX ADMIN — APIXABAN 5 MG: 5 TABLET, FILM COATED ORAL at 09:03

## 2024-03-03 RX ADMIN — DULOXETINE HYDROCHLORIDE 30 MG: 30 CAPSULE, DELAYED RELEASE ORAL at 08:03

## 2024-03-03 RX ADMIN — ACETAMINOPHEN 650 MG: 325 TABLET ORAL at 12:03

## 2024-03-03 RX ADMIN — ASPIRIN 81 MG: 81 TABLET, COATED ORAL at 08:03

## 2024-03-03 RX ADMIN — METOPROLOL SUCCINATE 100 MG: 50 TABLET, EXTENDED RELEASE ORAL at 08:03

## 2024-03-03 RX ADMIN — LEVALBUTEROL HYDROCHLORIDE 1.25 MG: 1.25 SOLUTION RESPIRATORY (INHALATION) at 12:03

## 2024-03-03 RX ADMIN — IPRATROPIUM BROMIDE 0.5 MG: 0.5 SOLUTION RESPIRATORY (INHALATION) at 07:03

## 2024-03-03 RX ADMIN — POTASSIUM BICARBONATE 50 MEQ: 977.5 TABLET, EFFERVESCENT ORAL at 09:03

## 2024-03-03 RX ADMIN — DIGOXIN 125 MCG: 125 TABLET ORAL at 08:03

## 2024-03-03 RX ADMIN — INSULIN ASPART 4 UNITS: 100 INJECTION, SOLUTION INTRAVENOUS; SUBCUTANEOUS at 10:03

## 2024-03-03 RX ADMIN — BUDESONIDE 0.5 MG: 0.5 INHALANT RESPIRATORY (INHALATION) at 07:03

## 2024-03-03 RX ADMIN — APIXABAN 5 MG: 5 TABLET, FILM COATED ORAL at 08:03

## 2024-03-03 RX ADMIN — FAMOTIDINE 20 MG: 20 TABLET ORAL at 08:03

## 2024-03-03 RX ADMIN — FUROSEMIDE 80 MG: 10 INJECTION, SOLUTION INTRAVENOUS at 08:03

## 2024-03-03 RX ADMIN — MUPIROCIN 1 G: 20 OINTMENT TOPICAL at 08:03

## 2024-03-03 RX ADMIN — FUROSEMIDE 80 MG: 10 INJECTION, SOLUTION INTRAVENOUS at 05:03

## 2024-03-03 RX ADMIN — IPRATROPIUM BROMIDE 0.5 MG: 0.5 SOLUTION RESPIRATORY (INHALATION) at 12:03

## 2024-03-03 RX ADMIN — MUPIROCIN 1 G: 20 OINTMENT TOPICAL at 09:03

## 2024-03-03 RX ADMIN — LEVALBUTEROL HYDROCHLORIDE 1.25 MG: 1.25 SOLUTION RESPIRATORY (INHALATION) at 07:03

## 2024-03-03 RX ADMIN — Medication 400 MG: at 08:03

## 2024-03-03 NOTE — ASSESSMENT & PLAN NOTE
Creatine stable for now. BMP reviewed- noted Estimated Creatinine Clearance: 38.1 mL/min (based on SCr of 1 mg/dL). according to latest data. Based on current GFR, CKD stage is stage 3 - GFR 30-59.  Monitor UOP and serial BMP and adjust therapy as needed. Renally dose meds. Avoid nephrotoxic medications and procedures.  - Monitor renal function with diuresis

## 2024-03-03 NOTE — PLAN OF CARE
Milly Jaime is very pleasant and is happy to be receiving some rest. She is accepting of her condition and comorbidities.  Pt has been successfully offloading fluid and has reduced 1.5 kg in weight. Pt was not happy with lunch; daughter visited and brought food from home.  Morning insulin was held due to low blood sugar. Coverage was resumed past lunch.  Patient reports feeling much bettee. Pt awaiting transfer. Will continue to monitor.     ----------------------------------------------------------------------------------------------------  Problem: Diabetes Comorbidity  Goal: Blood Glucose Level Within Targeted Range  Outcome: Ongoing, Progressing     Problem: Skin Injury Risk Increased  Goal: Skin Health and Integrity  Outcome: Ongoing, Progressing     Problem: Adult Inpatient Plan of Care  Goal: Plan of Care Review  Outcome: Adequate for Care Transition  Goal: Patient-Specific Goal (Individualized)  Outcome: Adequate for Care Transition  Goal: Absence of Hospital-Acquired Illness or Injury  Outcome: Adequate for Care Transition  Goal: Optimal Comfort and Wellbeing  Outcome: Adequate for Care Transition  Goal: Readiness for Transition of Care  Outcome: Adequate for Care Transition     Problem: Fluid Imbalance (Pneumonia)  Goal: Fluid Balance  Outcome: Adequate for Care Transition     Problem: Infection (Pneumonia)  Goal: Resolution of Infection Signs and Symptoms  Outcome: Adequate for Care Transition     Problem: Respiratory Compromise (Pneumonia)  Goal: Effective Oxygenation and Ventilation  Outcome: Adequate for Care Transition     Problem: Fall Injury Risk  Goal: Absence of Fall and Fall-Related Injury  Outcome: Adequate for Care Transition     Problem: Impaired Wound Healing  Goal: Optimal Wound Healing  Outcome: Adequate for Care Transition

## 2024-03-03 NOTE — PLAN OF CARE
Problem: Adult Inpatient Plan of Care  Goal: Plan of Care Review  Outcome: Ongoing, Progressing  Goal: Patient-Specific Goal (Individualized)  Outcome: Ongoing, Progressing  Goal: Absence of Hospital-Acquired Illness or Injury  Outcome: Ongoing, Progressing  Goal: Optimal Comfort and Wellbeing  Outcome: Ongoing, Progressing  Goal: Readiness for Transition of Care  Outcome: Ongoing, Progressing     Problem: Diabetes Comorbidity  Goal: Blood Glucose Level Within Targeted Range  Outcome: Ongoing, Progressing     Problem: Fluid Imbalance (Pneumonia)  Goal: Fluid Balance  Outcome: Ongoing, Progressing     Problem: Infection (Pneumonia)  Goal: Resolution of Infection Signs and Symptoms  Outcome: Ongoing, Progressing     Problem: Respiratory Compromise (Pneumonia)  Goal: Effective Oxygenation and Ventilation  Outcome: Ongoing, Progressing     Problem: Skin Injury Risk Increased  Goal: Skin Health and Integrity  Outcome: Ongoing, Progressing     Problem: Fall Injury Risk  Goal: Absence of Fall and Fall-Related Injury  Outcome: Ongoing, Progressing     Problem: Impaired Wound Healing  Goal: Optimal Wound Healing  Outcome: Ongoing, Progressing

## 2024-03-03 NOTE — HOSPITAL COURSE
This is a 88 year old female who was admitted with acute hypoxic respiratory failure due to acute on chronic HFpEF. Patient has been diuresed well with IV lasix with > 2 L urine output on the first day. On admission she was on high flow NC needing admission to ICU. Now she is weaned to 5 L, she is feeling better, hemodynamically stable. Echo was done, pending report. Patient can be transferred out of ICU, PT, OT consulted. On 3/4 Cr up trended and Lasix was held.   3/5/24 pt feeling well and eager to go, but she did desat to 80s while lying in bed off O2 - has not previously required O2 - will need RT to test for home O2 and may require a bit more tuning up prior to d/c home.  She does admit to eating a lot of salty chips prior to onset of acute SOB, has noted PAINTER going to bathroom lately.    3/6/24 Pt does require O2 to maintain sats, has not had to use O2 in past, no chronic lung disease, has h/o HFpEF and this appears to be the problem, her AS s/p TAVR and TAA don't seem to be an issue, her d dimer was up so we got CTA chest and this was neg for PE, she did have the TAA and some pulm edema, but feeling great again today, we are setting up home O2, she will go home today, continue daily Lasix 40 mg PO, avoid salty foods, and f/u w/ her PCP +/- cards/pulm at their discretion as well as her TAA team (sees every few months and was told intervention at 6 cm, it's 5 now). No cp/back pain etc.  She needs to consider referral from PCP to be reassessed for TRISTEN/OHS as well, she very well may have that lurking in background and contributing to her susceptibility to CHF exacerbation.      11 pt ROS negative except as noted o/w    General:  A&O, NAD  HEENT: neck supple, PERRL/EOMI, EAC clear bilaterally, normal bilateral carotid upstroke w/o bruits, inf turbs clear bilaterally, OC/OP clear  Lungs: CTAB  CV: RRR w/o M/G/R  Abdomen: soft, NTND, no HSM, no bruits/masses/pulsations, obese  Ext: no edema  : def  Rectal:  def  Neuro: NF

## 2024-03-03 NOTE — RESPIRATORY THERAPY
03/02/24 1941   Patient Assessment/Suction   Level of Consciousness (AVPU) alert   Respiratory Effort Normal;Unlabored   Expansion/Accessory Muscles/Retractions no retractions;no use of accessory muscles   All Lung Fields Breath Sounds diminished   PRE-TX-O2   Device (Oxygen Therapy) high flow nasal cannula   Flow (L/min) 11   SpO2 (!) 92 %   Pulse Oximetry Type Continuous   $ Pulse Oximetry - Multiple Charge Pulse Oximetry - Multiple   Pulse 71   Resp 20   Aerosol Therapy   $ Aerosol Therapy Charges Aerosol Treatment   Daily Review of Necessity (SVN) completed   Respiratory Treatment Status (SVN) given   Treatment Route (SVN) mask;oxygen   Patient Position (SVN) semi-Villarreal's   Post Treatment Assessment (SVN) breath sounds unchanged   Signs of Intolerance (SVN) none   Breath Sounds Post-Respiratory Treatment   Throughout All Fields Post-Treatment All Fields   Throughout All Fields Post-Treatment no change   Post-treatment Heart Rate (beats/min) 75   Post-treatment Resp Rate (breaths/min) 18   Education   $ Education BiPAP;Bronchodilator;15 min   Tobacco Cessation Intervention   Do you use any type of tobacco product? No   Respiratory Evaluation   Admitting Diagnosis PNA   Cardiac Diagnosis CAD, HTN, CHF

## 2024-03-03 NOTE — PROGRESS NOTES
Pulmonary/Critical Care Consult      PATIENT NAME: Milly Lee  MRN: [id number]  TODAY'S DATE: 2024  ADMIT DATE: 3/2/2024  AGE: 88 y.o. : 1935    CONSULT REQUESTED BY: Adán Fajardo MD    REASON FOR CONSULT:   Heart failure exacerbation    HISTORY OF PRESENT ILLNESS   Milly Lee is a 88 y.o. female with a PMH of HFpEF, PPM, CABG, TAVR, HTN, and GERD on whom we have been consulted for acute hypoxic respiratory failure.    3/3/24: Net -2.7 L since admission. Now down to 6 L NC satting 99%. Cr normal and stable at 1.0. Lactic acidosis improving. Remains afebrile with normal WBC count off Abx.     SMOKING HISTORY  No significant smoking history.  (States she smoked for 6 months as a teenager.)    REVIEW OF SYSTEMS  Shortness of breath and leg swelling.    ALLERGIES  Review of patient's allergies indicates:   Allergen Reactions    Morphine Anxiety       INPATIENT SCHEDULED MEDICATIONS   apixaban  5 mg Oral BID    aspirin  81 mg Oral Daily    digoxin  125 mcg Oral Daily    DULoxetine  30 mg Oral Daily    famotidine  20 mg Oral Daily    furosemide (LASIX) injection  80 mg Intravenous BID    insulin NPH/Reg human  40 Units Subcutaneous BID AC    magnesium oxide  400 mg Oral Daily    metoprolol succinate  100 mg Oral Daily    mupirocin   Nasal BID         MEDICAL AND SURGICAL HISTORY  Past Medical History:   Diagnosis Date    Arthritis     Coronary artery disease     Diverticulitis     GERD (gastroesophageal reflux disease)     Hyperlipidemia     Hypertension      Past Surgical History:   Procedure Laterality Date    CARDIAC CATHETERIZATION      CARDIAC PACEMAKER PLACEMENT      CARDIAC VALVE SURGERY      CHOLECYSTECTOMY      CORONARY ARTERY BYPASS GRAFT      HYSTERECTOMY      TOTAL KNEE ARTHROPLASTY Right     TOTAL KNEE ARTHROPLASTY Left        ALCOHOL, TOBACCO AND DRUG USE  Social History     Tobacco Use   Smoking Status Former   Smokeless Tobacco Never     Social History  "    Substance and Sexual Activity   Alcohol Use No     Social History     Substance and Sexual Activity   Drug Use No       FAMILY HISTORY  Family History   Problem Relation Age of Onset    Hypertension Mother        VITAL SIGNS (MOST RECENT)  Temp: 97.8 °F (36.6 °C) (03/03/24 1130)  Pulse: 81 (03/03/24 1130)  Resp: (!) 30 (03/03/24 1101)  BP: (!) 187/86 (03/03/24 1000)  SpO2: (!) 94 % (03/03/24 1130)    INTAKE AND OUTPUT (LAST 24 HOURS):  Intake/Output Summary (Last 24 hours) at 3/3/2024 1242  Last data filed at 3/3/2024 0604  Gross per 24 hour   Intake --   Output 2650 ml   Net -2650 ml       WEIGHT  Wt Readings from Last 1 Encounters:   03/02/24 83.9 kg (184 lb 15.5 oz)       PHYSICAL EXAM  GENERAL: A&O. NAD.  HEENT: Extraocular movements intact. Pharynx moist.  NECK: Supple. No JVD or hepatojugular reflux.  HEART: Normal rate and regular rhythm. No murmur or gallop auscultated.  LUNGS: Bibasilar crackles. Diminished breath sounds at bilateral bases.  ABDOMEN: Soft, non-tender, non-distended, no masses palpated.  EXTREMITIES: Normal muscle tone and joint movement, no cyanosis or clubbing.   LYMPHATICS: No adenopathy palpated, no edema.  SKIN: Dry, intact, no lesions.   NEURO: No gross motor or cognitive deficit.  PSYCH: Appropriate affect    ACUTE PHASE REACTANT (LAST 24 HOURS)  No results for input(s): "FERRITIN", "CRP", "LDH", "DDIMER" in the last 24 hours.    CBC LAST (LAST 24 HOURS)  Recent Labs   Lab 03/03/24  0307   WBC 8.29   RBC 4.03   HGB 12.6   HCT 38.7   MCV 96   MCH 31.3*   MCHC 32.6   RDW 16.5*   *   MPV 11.1   GRAN 65.3  5.4   LYMPH 16.3*  1.4   MONO 14.6  1.2*   BASO 0.10   NRBC 0       CHEMISTRY LAST (LAST 24 HOURS)  Recent Labs   Lab 03/03/24  0307      K 3.8      CO2 29   ANIONGAP 8   BUN 11   CREATININE 1.0   GLU 73   CALCIUM 9.4   MG 1.8   ALBUMIN 3.9   PROT 7.2   ALKPHOS 65   ALT 11   AST 23   BILITOT 0.8       COAGULATION LAST (LAST 24 HOURS)  No results for input(s): " ""LABPT", "INR", "APTT" in the last 24 hours.      CARDIAC PROFILE (LAST 24 HOURS)  Recent Labs   Lab 03/02/24  1141   *   CPK <10*       LAST 7 DAYS MICROBIOLOGY   Microbiology Results (last 7 days)       Procedure Component Value Units Date/Time    Blood culture x two cultures. Draw prior to antibiotics. [[id number]] Collected: 03/02/24 1338    Order Status: Completed Specimen: Blood from Peripheral, Hand, Left Updated: 03/02/24 2117     Blood Culture, Routine No Growth to date    Narrative:      Aerobic and anaerobic    Blood culture x two cultures. Draw prior to antibiotics. [[id number]] Collected: 03/02/24 1338    Order Status: Completed Specimen: Blood from Peripheral, Hand, Right Updated: 03/02/24 2117     Blood Culture, Routine No Growth to date    Narrative:      Aerobic and anaerobic            MOST RECENT IMAGING  X-Ray Chest AP Portable  HISTORY: Sepsis    FINDINGS: Portable chest radiograph at 1139 hours compared to 11/19/2023 shows dual lead left subclavian CCD unchanged in position, with median sternotomy wires and changes of transarterial aortic valvuloplasty. The cardiac silhouette is enlarged, stable in size, with normal pulmonary vascularity and scattered aortic vascular calcifications.    There are scattered bilateral lower lung reticulonodular and acinar opacities, with blunting of the costophrenic angles. The upper lungs are clear, with no evidence of interstitial pulmonary edema or pneumothorax.    IMPRESSION: Bilateral lower lung opacities suggesting multifocal pneumonia, with small pleural effusions not excluded. Follow-up PA and lateral chest radiograph in 4-6 weeks after appropriate therapy is recommended to document complete resolution.    Electronically signed by:  Kenn Guerrier MD  03/02/2024 12:01 PM CST Workstation: [id number]      CURRENT VISIT EKG  Results for orders placed or performed during the hospital encounter of 03/02/24   EKG 12-lead   Result Value Ref Range    QRS " Duration 94 ms    OHS QTC Calculation 439 ms    Narrative    Test Reason : R06.00,    Vent. Rate : 077 BPM     Atrial Rate : 068 BPM     P-R Int : 000 ms          QRS Dur : 094 ms      QT Int : 388 ms       P-R-T Axes : 000 -30 199 degrees     QTc Int : 439 ms    Undetermined rhythm  Left axis deviation  Septal infarct ,age undetermined  Marked ST abnormality, possible lateral subendocardial injury  Abnormal ECG  When compared with ECG of 19-NOV-2023 18:05,  Current undetermined rhythm precludes rhythm comparison, needs review    Referred By: [email or web address]   SELF           Confirmed By:        ECHOCARDIOGRAM RESULTS  No results found for this or any previous visit.        RESPIRATORY SUPPORT              LAST ARTERIAL BLOOD GAS  ABG  Recent Labs   Lab 03/02/24  1143   PH 7.431   PO2 58*   PCO2 32.9*   HCO3 21.9*   BE -2     CXR 3/2/24: Diffuse, hazy, bilateral airspace and interstitial opacities with basilar predominance consistent with pulmonary edema. Cardiomegaly. Sternotomy wires.    IMPRESSION AND PLAN  Milly Lee is a 88 y.o. female with a PMH of HFpEF, PPM, CABG, TAVR, HTN, and GERD on whom we have been consulted for acute hypoxic respiratory failure.    #Heart failure exacerbation  #Pulmonary edema  - diurese as tolerated  - GDMT  - supplemental O2 as needed  - Abx d/c'ed, as there are no signs of infection  - f/u TTE interpretation  - repeat CXR in AM    #Lactic acidosis, likely due to poor systemic perfusion from CHF, improved  Beta-agonists may also cause lactic acidosis. She does not likely have obstructive lung disease, so they are not indicated anyway.  - d/c'ed beta agonists and other bronchodilators  - manage CHF as above  - trend lactate    #Hypercapnia, likely secondary to OHS  #TRISTEN  - stopped bronchodilators  - BiPAP with sleep    Disposition: transfer out of ICU    Pulmonary & Critical Care Medicine will sign off at this time.   Please call with any further questions or  concerns.    Discussed with hospitalist. I have reviewed the patient's most recent CBC and serum chemistry, as well as the most recent chest x-ray and/or chest CT. My interpretation of the chest imaging is as above.The patient is at high risk of morbidity or death and should be admitted to the hospital.    Uriel Grant MD  [facility]  Department of Pulmonology  Date of Service: 03/03/2024  1:11 PM

## 2024-03-03 NOTE — ASSESSMENT & PLAN NOTE
Chronic, uncontrolled. Latest blood pressure and vitals reviewed-     Temp:  [97.3 °F (36.3 °C)-98.1 °F (36.7 °C)]   Pulse:  []   Resp:  [20-40]   BP: (125-187)/(60-90)   SpO2:  [76 %-98 %] .   Home meds for hypertension were reviewed and noted below.   Hypertension Medications               diltiaZEM (CARDIZEM CD) 120 MG Cp24 Take 1 capsule (120 mg total) by mouth once daily.    furosemide (LASIX) 40 MG tablet Take 40 mg by mouth once daily.     metoprolol succinate (TOPROL-XL) 100 MG 24 hr tablet Take 1 tablet (100 mg total) by mouth once daily.    nitroGLYCERIN (NITROSTAT) 0.4 MG SL tablet Place 0.4 mg under the tongue every 5 (five) minutes as needed for Chest pain.            While in the hospital, will manage blood pressure as follows; Adjust home antihypertensive regimen as follows- Cont metoprolol. Added PRN hydralazine. Holding Cardizem until echo comes back and EF was determined.     Will utilize p.r.n. blood pressure medication only if patient's blood pressure greater than 160/100 and she develops symptoms such as worsening chest pain or shortness of breath.

## 2024-03-03 NOTE — CARE UPDATE
03/03/24 0711   Patient Assessment/Suction   Level of Consciousness (AVPU) alert   Respiratory Effort Normal   CARLYN Breath Sounds coarse   LLL Breath Sounds diminished   RUL Breath Sounds coarse   RML Breath Sounds coarse   RLL Breath Sounds diminished   Rhythm/Pattern, Respiratory pattern regular;depth regular;unlabored   Cough Frequency infrequent   PRE-TX-O2   Device (Oxygen Therapy) nasal cannula with humidification   $ Is the patient on Low Flow Oxygen? Yes   Flow (L/min) 9   SpO2 95 %   Pulse Oximetry Type Continuous   $ Pulse Oximetry - Multiple Charge Pulse Oximetry - Multiple   Pulse 73   Resp (!) 22   Positioning HOB elevated 45 degrees   Aerosol Therapy   $ Aerosol Therapy Charges Aerosol Treatment  (xop/atro)   Daily Review of Necessity (SVN) completed   Respiratory Treatment Status (SVN) given   Treatment Route (SVN) mask;oxygen   Patient Position (SVN) semi-Vlilarreal's   Post Treatment Assessment (SVN) increased aeration   Signs of Intolerance (SVN) none   Preset CPAP/BiPAP Settings   Mode Of Delivery Standby;BiPAP S/T   $ CPAP/BiPAP Daily Charge BiPAP/CPAP Daily   $ Initial CPAP/BiPAP Setup? No   $ Is patient using? Yes   Equipment Type V60   Respiratory Evaluation   $ Care Plan Tech Time 15 min     Continue to wean as tolerated

## 2024-03-03 NOTE — SUBJECTIVE & OBJECTIVE
Interval History: Patient states she is feeling much better. SOB has improved. No chest pain.     Review of Systems  Objective:     Vital Signs (Most Recent):  Temp: 97.8 °F (36.6 °C) (03/03/24 1130)  Pulse: 81 (03/03/24 1130)  Resp: (!) 30 (03/03/24 1030)  BP: (!) 187/86 (03/03/24 1000)  SpO2: (!) 94 % (03/03/24 1130) Vital Signs (24h Range):  Temp:  [97.3 °F (36.3 °C)-98.1 °F (36.7 °C)] 97.8 °F (36.6 °C)  Pulse:  [] 81  Resp:  [20-40] 30  SpO2:  [76 %-98 %] 94 %  BP: (125-187)/(60-90) 187/86     Weight: 83.9 kg (184 lb 15.5 oz)  Body mass index is 36.12 kg/m².    Intake/Output Summary (Last 24 hours) at 3/3/2024 1209  Last data filed at 3/3/2024 0604  Gross per 24 hour   Intake --   Output 2650 ml   Net -2650 ml         Physical Exam  Vitals and nursing note reviewed.   Constitutional:       General: She is not in acute distress.     Appearance: She is obese.   HENT:      Head: Atraumatic.      Mouth/Throat:      Mouth: Mucous membranes are moist.      Pharynx: Oropharynx is clear.   Eyes:      General: No scleral icterus.     Conjunctiva/sclera: Conjunctivae normal.      Pupils: Pupils are equal, round, and reactive to light.   Cardiovascular:      Rate and Rhythm: Normal rate and regular rhythm.      Heart sounds: No murmur heard.  Pulmonary:      Effort: No respiratory distress.      Breath sounds: Rales (left base) present. No wheezing or rhonchi.   Abdominal:      General: Abdomen is flat. Bowel sounds are normal.      Palpations: Abdomen is soft.   Musculoskeletal:         General: No swelling or deformity.      Cervical back: No rigidity or tenderness.   Skin:     Coloration: Skin is not jaundiced or pale.      Findings: No bruising, erythema or rash.   Neurological:      General: No focal deficit present.      Mental Status: She is alert and oriented to person, place, and time.      Cranial Nerves: No cranial nerve deficit.      Sensory: No sensory deficit.      Motor: No weakness.   Psychiatric:          Mood and Affect: Mood normal.         Behavior: Behavior normal.         Thought Content: Thought content normal.         Judgment: Judgment normal.             Significant Labs: All pertinent labs within the past 24 hours have been reviewed.  CBC:   Recent Labs   Lab 03/02/24  1141 03/02/24  1143 03/03/24  0307   WBC 9.09  --  8.29   HGB 12.3  --  12.6   HCT 38.1 36 38.7     --  146*     CMP:   Recent Labs   Lab 03/02/24  1141 03/03/24  0307    138   K 4.5 3.8    101   CO2 26 29   * 73   BUN 12 11   CREATININE 1.1 1.0   CALCIUM 9.5 9.4   PROT 7.3 7.2   ALBUMIN 3.9 3.9   BILITOT 1.0 0.8   ALKPHOS 71 65   AST 24 23   ALT 14 11   ANIONGAP 8 8       Significant Imaging: I have reviewed all pertinent imaging results/findings within the past 24 hours.

## 2024-03-03 NOTE — ASSESSMENT & PLAN NOTE
Patient with Hypoxic Respiratory failure which is Acute.  she is not on home oxygen. Supplemental oxygen was provided and noted-      .   Signs/symptoms of respiratory failure include- tachypnea, increased work of breathing, respiratory distress, and use of accessory muscles. Contributing diagnoses includes - CHF Labs and images were reviewed. Patient Has recent ABG, which has been reviewed. Will treat underlying causes and adjust management of respiratory failure as follows-     - cont diuresis as below

## 2024-03-03 NOTE — PROGRESS NOTES
[facility]  Progress Note    Patient Name: Milly Lee  MRN: [id number]  Patient Class: IP- Inpatient   Admission Date: 3/2/2024  Length of Stay: 1 days  Attending Physician: Adán Fajardo MD  Primary Care Provider: Michela Christian MD        Subjective:     Principal Problem:Acute hypoxemic respiratory failure        HPI:  88 year old pt getting admitted with a cute hypoxic resp failure and acute CHF flare  History is limited because pt is a very poor historian  Per pt she started having shortness of breath since yesterday  Later she noticed SOB on exertion and today AM she had SOB on rest  She came to hospital and got admitted  Denies fever/chest pain or other issues   Pt was admitted to ICU because of resp distress and in need of HF O2 in ER  After diuresis pt felt better  Case discussed with Dr Grant and decision was made to hold any antibiotics at this point      Pt had CABG in 2009  Pt had LHC in Feb 2018 with no PCI  Pt had Normal stress test on November 2023  Pt had TAVR in March 2018  Pt had EGD with dilatation of Esophagus in August 2023  She last saw her Cardiologist :Dr Benitez in [place] [facility] on August 2023    Overview/Hospital Course:  This is a 88 year old female who was admitted with acute hypoxic respiratory failure due to acute on chronic HFpEF. Patient has been diuresed well with IV lasix with > 2 L urine output on the first day. On admission she was on high flow NC needing admission to ICU. Now she is weaned to 5 L, she is feeling better, hemodynamically stable. Echo was done, pending report. Patient can be transferred out of ICU, start working with therapy.     Interval History: Patient states she is feeling much better. SOB has improved. No chest pain.     Review of Systems  Objective:     Vital Signs (Most Recent):  Temp: 97.8 °F (36.6 °C) (03/03/24 1130)  Pulse: 81 (03/03/24 1130)  Resp: (!) 30 (03/03/24 1030)  BP: (!) 187/86 (03/03/24  1000)  SpO2: (!) 94 % (03/03/24 1130) Vital Signs (24h Range):  Temp:  [97.3 °F (36.3 °C)-98.1 °F (36.7 °C)] 97.8 °F (36.6 °C)  Pulse:  [] 81  Resp:  [20-40] 30  SpO2:  [76 %-98 %] 94 %  BP: (125-187)/(60-90) 187/86     Weight: 83.9 kg (184 lb 15.5 oz)  Body mass index is 36.12 kg/m².    Intake/Output Summary (Last 24 hours) at 3/3/2024 1209  Last data filed at 3/3/2024 0604  Gross per 24 hour   Intake --   Output 2650 ml   Net -2650 ml         Physical Exam  Vitals and nursing note reviewed.   Constitutional:       General: She is not in acute distress.     Appearance: She is obese.   HENT:      Head: Atraumatic.      Mouth/Throat:      Mouth: Mucous membranes are moist.      Pharynx: Oropharynx is clear.   Eyes:      General: No scleral icterus.     Conjunctiva/sclera: Conjunctivae normal.      Pupils: Pupils are equal, round, and reactive to light.   Cardiovascular:      Rate and Rhythm: Normal rate and regular rhythm.      Heart sounds: No murmur heard.  Pulmonary:      Effort: No respiratory distress.      Breath sounds: Rales (left base) present. No wheezing or rhonchi.   Abdominal:      General: Abdomen is flat. Bowel sounds are normal.      Palpations: Abdomen is soft.   Musculoskeletal:         General: No swelling or deformity.      Cervical back: No rigidity or tenderness.   Skin:     Coloration: Skin is not jaundiced or pale.      Findings: No bruising, erythema or rash.   Neurological:      General: No focal deficit present.      Mental Status: She is alert and oriented to person, place, and time.      Cranial Nerves: No cranial nerve deficit.      Sensory: No sensory deficit.      Motor: No weakness.   Psychiatric:         Mood and Affect: Mood normal.         Behavior: Behavior normal.         Thought Content: Thought content normal.         Judgment: Judgment normal.             Significant Labs: All pertinent labs within the past 24 hours have been reviewed.  CBC:   Recent Labs   Lab  03/02/24  1141 03/02/24  1143 03/03/24  0307   WBC 9.09  --  8.29   HGB 12.3  --  12.6   HCT 38.1 36 38.7     --  146*     CMP:   Recent Labs   Lab 03/02/24  1141 03/03/24  0307    138   K 4.5 3.8    101   CO2 26 29   * 73   BUN 12 11   CREATININE 1.1 1.0   CALCIUM 9.5 9.4   PROT 7.3 7.2   ALBUMIN 3.9 3.9   BILITOT 1.0 0.8   ALKPHOS 71 65   AST 24 23   ALT 14 11   ANIONGAP 8 8       Significant Imaging: I have reviewed all pertinent imaging results/findings within the past 24 hours.    Assessment/Plan:      * Acute hypoxemic respiratory failure  Patient with Hypoxic Respiratory failure which is Acute.  she is not on home oxygen. Supplemental oxygen was provided and noted-      .   Signs/symptoms of respiratory failure include- tachypnea, increased work of breathing, respiratory distress, and use of accessory muscles. Contributing diagnoses includes - CHF Labs and images were reviewed. Patient Has recent ABG, which has been reviewed. Will treat underlying causes and adjust management of respiratory failure as follows-     - cont diuresis as below     Acute on chronic diastolic (congestive) heart failure  - cont iv lasix 80 mg BID  - Strict I/O  - follow echo     Longstanding persistent atrial fibrillation  Secondary hypercoagulable state   - Maintain home medications    CKD (chronic kidney disease), stage III  Creatine stable for now. BMP reviewed- noted Estimated Creatinine Clearance: 38.1 mL/min (based on SCr of 1 mg/dL). according to latest data. Based on current GFR, CKD stage is stage 3 - GFR 30-59.  Monitor UOP and serial BMP and adjust therapy as needed. Renally dose meds. Avoid nephrotoxic medications and procedures.  - Monitor renal function with diuresis     CAD (coronary artery disease)  Aware  As per HPI    Ascending aortic aneurysm  Not a candidate for Sx as per records  Aggressive BP control needed      Type 2 diabetes mellitus with stage 3 chronic kidney disease, with  long-term current use of insulin  Maintain SSI and other insulin regime    Cardiac pacemaker in situ  Aware       H/O prosthetic aortic valve replacement  Aware   Bovine valve       Uncontrolled hypertension  Chronic, uncontrolled. Latest blood pressure and vitals reviewed-     Temp:  [97.3 °F (36.3 °C)-98.1 °F (36.7 °C)]   Pulse:  []   Resp:  [20-40]   BP: (125-187)/(60-90)   SpO2:  [76 %-98 %] .   Home meds for hypertension were reviewed and noted below.   Hypertension Medications               diltiaZEM (CARDIZEM CD) 120 MG Cp24 Take 1 capsule (120 mg total) by mouth once daily.    furosemide (LASIX) 40 MG tablet Take 40 mg by mouth once daily.     metoprolol succinate (TOPROL-XL) 100 MG 24 hr tablet Take 1 tablet (100 mg total) by mouth once daily.    nitroGLYCERIN (NITROSTAT) 0.4 MG SL tablet Place 0.4 mg under the tongue every 5 (five) minutes as needed for Chest pain.            While in the hospital, will manage blood pressure as follows; Adjust home antihypertensive regimen as follows- Cont metoprolol. Added PRN hydralazine. Holding Cardizem until echo comes back and EF was determined.     Will utilize p.r.n. blood pressure medication only if patient's blood pressure greater than 160/100 and she develops symptoms such as worsening chest pain or shortness of breath.      VTE Risk Mitigation (From admission, onward)           Ordered     apixaban tablet 5 mg  2 times daily         03/02/24 1254     IP VTE HIGH RISK PATIENT  Once         03/02/24 1254     Place sequential compression device  Until discontinued         03/02/24 1254                    Discharge Planning   LEDA:      Code Status: DNR   Is the patient medically ready for discharge?:     Reason for patient still in hospital (select all that apply): Treatment  Discharge Plan A: Home with family            Adán Fajardo MD  Department of Hospital Medicine   [facility]

## 2024-03-04 PROBLEM — N17.9 AKI (ACUTE KIDNEY INJURY): Status: ACTIVE | Noted: 2024-03-04

## 2024-03-04 LAB
ALBUMIN SERPL BCP-MCNC: 3.7 G/DL (ref 3.5–5.2)
ALP SERPL-CCNC: 72 U/L (ref 55–135)
ALT SERPL W/O P-5'-P-CCNC: 12 U/L (ref 10–44)
ANION GAP SERPL CALC-SCNC: 7 MMOL/L (ref 8–16)
AST SERPL-CCNC: 23 U/L (ref 10–40)
BASOPHILS # BLD AUTO: 0.09 K/UL (ref 0–0.2)
BASOPHILS NFR BLD: 1.3 % (ref 0–1.9)
BILIRUB SERPL-MCNC: 0.7 MG/DL (ref 0.1–1)
BUN SERPL-MCNC: 18 MG/DL (ref 8–23)
CALCIUM SERPL-MCNC: 9.8 MG/DL (ref 8.7–10.5)
CHLORIDE SERPL-SCNC: 97 MMOL/L (ref 95–110)
CO2 SERPL-SCNC: 34 MMOL/L (ref 23–29)
CREAT SERPL-MCNC: 1.6 MG/DL (ref 0.5–1.4)
DIFFERENTIAL METHOD BLD: ABNORMAL
EOSINOPHIL # BLD AUTO: 0.2 K/UL (ref 0–0.5)
EOSINOPHIL NFR BLD: 2.9 % (ref 0–8)
ERYTHROCYTE [DISTWIDTH] IN BLOOD BY AUTOMATED COUNT: 16.5 % (ref 11.5–14.5)
EST. GFR  (NO RACE VARIABLE): 31 ML/MIN/1.73 M^2
GLUCOSE SERPL-MCNC: 128 MG/DL (ref 70–110)
GLUCOSE SERPL-MCNC: 142 MG/DL (ref 70–110)
GLUCOSE SERPL-MCNC: 150 MG/DL (ref 70–110)
GLUCOSE SERPL-MCNC: 170 MG/DL (ref 70–110)
GLUCOSE SERPL-MCNC: 182 MG/DL (ref 70–110)
GLUCOSE SERPL-MCNC: 216 MG/DL (ref 70–110)
HCT VFR BLD AUTO: 37.7 % (ref 37–48.5)
HGB BLD-MCNC: 12.5 G/DL (ref 12–16)
IMM GRANULOCYTES # BLD AUTO: 0.03 K/UL (ref 0–0.04)
IMM GRANULOCYTES NFR BLD AUTO: 0.4 % (ref 0–0.5)
LYMPHOCYTES # BLD AUTO: 1.3 K/UL (ref 1–4.8)
LYMPHOCYTES NFR BLD: 18.5 % (ref 18–48)
MAGNESIUM SERPL-MCNC: 1.8 MG/DL (ref 1.6–2.6)
MCH RBC QN AUTO: 31.3 PG (ref 27–31)
MCHC RBC AUTO-ENTMCNC: 33.2 G/DL (ref 32–36)
MCV RBC AUTO: 95 FL (ref 82–98)
MONOCYTES # BLD AUTO: 0.8 K/UL (ref 0.3–1)
MONOCYTES NFR BLD: 11.9 % (ref 4–15)
NEUTROPHILS # BLD AUTO: 4.5 K/UL (ref 1.8–7.7)
NEUTROPHILS NFR BLD: 65 % (ref 38–73)
NRBC BLD-RTO: 0 /100 WBC
PLATELET # BLD AUTO: 160 K/UL (ref 150–450)
PMV BLD AUTO: 10.8 FL (ref 9.2–12.9)
POTASSIUM SERPL-SCNC: 3.9 MMOL/L (ref 3.5–5.1)
PROT SERPL-MCNC: 7 G/DL (ref 6–8.4)
RBC # BLD AUTO: 3.99 M/UL (ref 4–5.4)
SODIUM SERPL-SCNC: 138 MMOL/L (ref 136–145)
WBC # BLD AUTO: 6.87 K/UL (ref 3.9–12.7)

## 2024-03-04 PROCEDURE — 27100171 HC OXYGEN HIGH FLOW UP TO 24 HOURS

## 2024-03-04 PROCEDURE — 63600175 PHARM REV CODE 636 W HCPCS: Performed by: INTERNAL MEDICINE

## 2024-03-04 PROCEDURE — 83735 ASSAY OF MAGNESIUM: CPT | Performed by: INTERNAL MEDICINE

## 2024-03-04 PROCEDURE — 97162 PT EVAL MOD COMPLEX 30 MIN: CPT

## 2024-03-04 PROCEDURE — 97535 SELF CARE MNGMENT TRAINING: CPT

## 2024-03-04 PROCEDURE — 36415 COLL VENOUS BLD VENIPUNCTURE: CPT | Performed by: INTERNAL MEDICINE

## 2024-03-04 PROCEDURE — 25000003 PHARM REV CODE 250: Performed by: INTERNAL MEDICINE

## 2024-03-04 PROCEDURE — 85025 COMPLETE CBC W/AUTO DIFF WBC: CPT | Performed by: INTERNAL MEDICINE

## 2024-03-04 PROCEDURE — 94660 CPAP INITIATION&MGMT: CPT

## 2024-03-04 PROCEDURE — 80053 COMPREHEN METABOLIC PANEL: CPT | Performed by: INTERNAL MEDICINE

## 2024-03-04 PROCEDURE — 97165 OT EVAL LOW COMPLEX 30 MIN: CPT

## 2024-03-04 PROCEDURE — 99900035 HC TECH TIME PER 15 MIN (STAT)

## 2024-03-04 PROCEDURE — 94761 N-INVAS EAR/PLS OXIMETRY MLT: CPT

## 2024-03-04 PROCEDURE — 12000002 HC ACUTE/MED SURGE SEMI-PRIVATE ROOM

## 2024-03-04 PROCEDURE — 97116 GAIT TRAINING THERAPY: CPT

## 2024-03-04 PROCEDURE — 82962 GLUCOSE BLOOD TEST: CPT

## 2024-03-04 RX ADMIN — FAMOTIDINE 20 MG: 20 TABLET ORAL at 09:03

## 2024-03-04 RX ADMIN — Medication 400 MG: at 09:03

## 2024-03-04 RX ADMIN — ACETAMINOPHEN 325 MG: 325 TABLET ORAL at 06:03

## 2024-03-04 RX ADMIN — METOPROLOL SUCCINATE 100 MG: 50 TABLET, EXTENDED RELEASE ORAL at 09:03

## 2024-03-04 RX ADMIN — DIGOXIN 125 MCG: 125 TABLET ORAL at 09:03

## 2024-03-04 RX ADMIN — APIXABAN 5 MG: 5 TABLET, FILM COATED ORAL at 09:03

## 2024-03-04 RX ADMIN — APIXABAN 2.5 MG: 2.5 TABLET, FILM COATED ORAL at 10:03

## 2024-03-04 RX ADMIN — ASPIRIN 81 MG: 81 TABLET, COATED ORAL at 09:03

## 2024-03-04 RX ADMIN — HUMAN INSULIN 20 UNITS: 100 INJECTION, SUSPENSION SUBCUTANEOUS at 04:03

## 2024-03-04 RX ADMIN — HUMAN INSULIN 40 UNITS: 100 INJECTION, SUSPENSION SUBCUTANEOUS at 06:03

## 2024-03-04 RX ADMIN — DULOXETINE HYDROCHLORIDE 30 MG: 30 CAPSULE, DELAYED RELEASE ORAL at 09:03

## 2024-03-04 RX ADMIN — MUPIROCIN 1 G: 20 OINTMENT TOPICAL at 09:03

## 2024-03-04 NOTE — PT/OT/SLP EVAL
Physical Therapy Evaluation    Patient Name:  Milly Lee   MRN:  [id number]    Recommendations:     Discharge Recommendations: Low Intensity Therapy   Discharge Equipment Recommendations: none   Barriers to discharge:  medical status    Assessment:     Milly Lee is a 88 y.o. female admitted with a medical diagnosis of Acute hypoxemic respiratory failure.  She presents with the following impairments/functional limitations: weakness, impaired endurance, impaired sensation, impaired self care skills, impaired functional mobility, gait instability, impaired balance, decreased lower extremity function, pain, impaired cardiopulmonary response to activity.  Pt asleep with HOB elevated upon entry to pt room but awakens easily to sound of name being spoken.  She is A & Ox3 and agreeable to PT. Pt does have some confusion at times but able to give functional history and have appropriate conversations with PT.  She requires Beulah for bed mobiltiy and CGA for safety with transfers and gait using RW. Pt has good family support and is appropriate to return home with low intensity therapy and 24/7 assist and supervision. No equipment needs at this time.    Rehab Prognosis: Fair; patient would benefit from acute skilled PT services to address these deficits and reach maximum level of function.    Recent Surgery: * No surgery found *      Plan:     During this hospitalization, patient to be seen 5 x/week to address the identified rehab impairments via gait training, therapeutic activities, therapeutic exercises and progress toward the following goals:    Plan of Care Expires:       Subjective     Chief Complaint: stiffness  Patient/Family Comments/goals: go home  Pain/Comfort:  Pain Rating 1: other (see comments) (did not quantify)  Location - Side 1: Left  Location 1: hip  Pain Addressed 1: Reposition, Distraction, Cessation of Activity    Patients cultural, spiritual, Taoism conflicts given the current  situation:      Living Environment:  Pt lives in [facility] with daughter and granddaughter, no KELSEY  Prior to admission, patients level of function was used rollater intermittently depemding on pain level.  Equipment used at home: cane, straight, shower chair, rollator.  DME owned (not currently used): none.  Upon discharge, patient will have assistance from family.    Objective:     Communicated with SUSANNE Werner prior to session.  Patient found HOB elevated with telemetry, peripheral IV, pulse ox (continuous), oxygen, blood pressure cuff, bed alarm, PureWick  upon PT entry to room.    General Precautions: Standard, fall  Orthopedic Precautions:    Braces:    Respiratory Status: Nasal cannula, flow 2 L/min    Exams:  Cognitive Exam:  Patient is oriented to Person, Place, and Situation  RLE ROM: WFL  RLE Strength: 4-/5  LLE ROM: WFL  LLE Strength: 4-/5    Functional Mobility:  Bed Mobility:     Supine to Sit: minimum assistance  Sit to Supine: minimum assistance  Transfers:     Sit to Stand:  contact guard assistance with rolling walker  Gait: 4x25' w/ RW and multiple brief standing rest breaks due to fatigue. Vcs for pursed lip breathing and tactile postural cues. Noted decreased heel strike and short swing phase      AM-PAC 6 CLICK MOBILITY  Total Score:17       Treatment & Education:  Pt was educated on the following: call light use, importance of OOB activity and functional mobility to negate the negative effects of prolonged bed rest during this hospitalization, safe transfers/ambulation and discharge planning recommendations/options.     Patient left HOB elevated with all lines intact, call button in reach, bed alarm on, RN notified, and dietition present.    GOALS:   Multidisciplinary Problems       Physical Therapy Goals          Problem: Physical Therapy    Goal Priority Disciplines Outcome Goal Variances Interventions   Physical Therapy Goal     PT, PT/OT Ongoing, Progressing     Description: 1. Supine to sit  with Stand-by Assistance  2. Sit to stand transfer with Stand-by Assistance  3.. Bed to chair transfer with Supervision using Rolling Walker  4. Gait  x 100 feet with Stand By Assistance using Rolling Walker.                          History:     Past Medical History:   Diagnosis Date    Arthritis     Coronary artery disease     Diverticulitis     GERD (gastroesophageal reflux disease)     Hyperlipidemia     Hypertension        Past Surgical History:   Procedure Laterality Date    CARDIAC CATHETERIZATION      CARDIAC PACEMAKER PLACEMENT  2007    CARDIAC VALVE SURGERY      CHOLECYSTECTOMY      CORONARY ARTERY BYPASS GRAFT      HYSTERECTOMY      TOTAL KNEE ARTHROPLASTY Right     TOTAL KNEE ARTHROPLASTY Left        Time Tracking:     PT Received On: 03/04/24  PT Start Time: 1415     PT Stop Time: 1436  PT Total Time (min): 21 min     Billable Minutes: Evaluation 8 and Gait Training 13      03/04/2024

## 2024-03-04 NOTE — PT/OT/SLP EVAL
Occupational Therapy   Evaluation    Name: Milly Lee  MRN: [id number]  Admitting Diagnosis: Acute hypoxemic respiratory failure  Recent Surgery: * No surgery found *      Recommendations:     Discharge Recommendations: Low Intensity Therapy  Discharge Equipment Recommendations:  none  Barriers to discharge:  None (increased assist with ADLs and mobility)    Assessment:     Milly Lee is a 88 y.o. female with a medical diagnosis of Acute hypoxemic respiratory failure.  Pt agreeable to OT evaluation this AM. Performance deficits affecting function: weakness, impaired endurance, impaired self care skills, impaired functional mobility, gait instability, impaired balance, decreased safety awareness, pain, impaired cardiopulmonary response to activity.      Rehab Prognosis: Good; patient would benefit from acute skilled OT services to address these deficits and reach maximum level of function.       Plan:     Patient to be seen 3 x/week to address the above listed problems via self-care/home management, therapeutic activities, therapeutic exercises  Plan of Care Expires: 04/04/24  Plan of Care Reviewed with: patient    Subjective     Chief Complaint: none stated  Patient/Family Comments/goals: none stated    Occupational Profile:  Living Environment: Pt lives with her daughter and granddaughter in a 1 story home with ~4 KELSEY. Pt has a tub/shower combo with a shower chair  Previous level of function: Mod I with ADLs, except requires assistance with dressing and drying off after showering. Pt relies on family for IADLs  Roles and Routines: mother; grandmother  Equipment Used at Home: cane, straight, shower chair, rollator  Assistance upon Discharge: yes, from family    Pain/Comfort:  Pain Rating 1:  (not rated)  Location - Side 1: Left  Location 1: hip  Pain Addressed 1: Reposition, Distraction, Cessation of Activity    Patients cultural, spiritual, Advent conflicts given the current situation:       Objective:     Communicated with: nursing prior to session.  Patient found HOB elevated with pulse ox (continuous), blood pressure cuff, telemetry, oxygen, bed alarm, PureWick upon OT entry to room.    General Precautions: Standard, fall  Orthopedic Precautions: N/A  Braces: N/A  Respiratory Status: Nasal cannula, flow 2 L/min    Occupational Performance:    Bed Mobility:    Patient completed Scooting/Bridging with stand by assistance  Patient completed Supine to Sit with contact guard assistance  Patient completed Sit to Supine with stand by assistance    Activities of Daily Living:  Feeding:  independence    Grooming: stand by assistance seated EOB to wash face  Toileting: purewick      Cognitive/Visual Perceptual:  Cognitive/Psychosocial Skills:     -       Follows Commands/attention:Follows one-step commands  -       Communication: clear/fluent  -       Memory: No Deficits noted  -       Safety awareness/insight to disability: impaired   -       Mood/Affect/Coping skills/emotional control: Appropriate to situation, Cooperative, and Pleasant    Physical Exam:  Balance:    -       SBA seated balance  Upper Extremity Range of Motion:     -       Right Upper Extremity: WFL  -       Left Upper Extremity: WFL  Upper Extremity Strength:    -       Right Upper Extremity: WFL  -       Left Upper Extremity: WFL   Strength:    -       Right Upper Extremity: WFL   -       Left Upper Extremity: WFL  Fine Motor Coordination:    -       Intact  Gross motor coordination:   WFL    AMPAC 6 Click ADL:  AMPAC Total Score: 19    Treatment & Education:  Pt educated on role of OT/POC, importance of OOB/EOB activity, use of call bell, and safety during ADLs, transfers, and functional mobility.    Patient left HOB elevated with all lines intact, call button in reach, and bed alarm on    GOALS:   Multidisciplinary Problems       Occupational Therapy Goals          Problem: Occupational Therapy    Goal Priority Disciplines Outcome  Interventions   Occupational Therapy Goal     OT, PT/OT     Description: Goals to be met by: 4/4/24     Patient will increase functional independence with ADLs by performing:    UE Dressing with Supervision.  LE Dressing with Minimal Assistance and Assistive Devices as needed.  Grooming while seated with Supervision.  Toileting from toilet with Supervision for hygiene and clothing management.   Toilet transfer to toilet with Supervision.                         History:     Past Medical History:   Diagnosis Date    Arthritis     Coronary artery disease     Diverticulitis     GERD (gastroesophageal reflux disease)     Hyperlipidemia     Hypertension          Past Surgical History:   Procedure Laterality Date    CARDIAC CATHETERIZATION      CARDIAC PACEMAKER PLACEMENT  2007    CARDIAC VALVE SURGERY      CHOLECYSTECTOMY      CORONARY ARTERY BYPASS GRAFT      HYSTERECTOMY      TOTAL KNEE ARTHROPLASTY Right     TOTAL KNEE ARTHROPLASTY Left        Time Tracking:     OT Date of Treatment: 03/04/24  OT Start Time: 0936  OT Stop Time: 1000  OT Total Time (min): 24 min    Billable Minutes:Evaluation 10  Self Care/Home Management 14    3/4/2024

## 2024-03-04 NOTE — ASSESSMENT & PLAN NOTE
Patient with acute kidney injury/acute renal failure likely due to pre-renal azotemia due to IVVD DAVID is currently stable. Baseline creatinine  1  - Labs reviewed- Renal function/electrolytes with Estimated Creatinine Clearance: 23.6 mL/min (A) (based on SCr of 1.6 mg/dL (H)). according to latest data. Monitor urine output and serial BMP and adjust therapy as needed. Avoid nephrotoxins and renally dose meds for GFR listed above.    Likely due to overdiuresis   - Holding Lasix today  - rpt BMP AM, if not improving consider IVF challenge

## 2024-03-04 NOTE — CONSULTS
Pt has been educated on a CHO Control diet due to having hyperglycemia. During rounds today, MD Medina asked me to educate her d/t this. We discussed how many servings of CHO she could have per day and per meal to help control her blood sugar. She was given a sample 1800 kcal diet with 5 days of menus along with tips and goal setting information. She was also offered OP counseling but declined it for now. She is going to get her granddaughters involved in to help her and I agreed that that would be a great idea. Will follow up prn.

## 2024-03-04 NOTE — PLAN OF CARE
Problem: Adult Inpatient Plan of Care  Goal: Plan of Care Review  3/4/2024 0508 by Guerda Velasquez RN  Outcome: Ongoing, Progressing  3/4/2024 0507 by Guerda Velasuqez RN  Outcome: Ongoing, Progressing  Goal: Patient-Specific Goal (Individualized)  3/4/2024 0508 by Guerda Velasquez RN  Outcome: Ongoing, Progressing  3/4/2024 0507 by Guerda Velasquez RN  Outcome: Ongoing, Progressing  Goal: Absence of Hospital-Acquired Illness or Injury  3/4/2024 0508 by Guerda Velasquez RN  Outcome: Ongoing, Progressing  3/4/2024 0507 by Guerda Velasquez RN  Outcome: Ongoing, Progressing  Goal: Optimal Comfort and Wellbeing  3/4/2024 0508 by Guerda Velasquez RN  Outcome: Ongoing, Progressing  3/4/2024 0507 by Guerda Velasquez RN  Outcome: Ongoing, Progressing  Goal: Readiness for Transition of Care  3/4/2024 0508 by Guerda Velasquez RN  Outcome: Ongoing, Progressing  3/4/2024 0507 by Guerda Velasquez RN  Outcome: Ongoing, Progressing     Problem: Diabetes Comorbidity  Goal: Blood Glucose Level Within Targeted Range  3/4/2024 0508 by Guerda Velasquez RN  Outcome: Ongoing, Progressing  3/4/2024 0507 by Guerda Velasquez RN  Outcome: Ongoing, Progressing     Problem: Fluid Imbalance (Pneumonia)  Goal: Fluid Balance  3/4/2024 0508 by Guerda Velasquez RN  Outcome: Ongoing, Progressing  3/4/2024 0507 by Guerda Velasquez RN  Outcome: Ongoing, Progressing     Problem: Infection (Pneumonia)  Goal: Resolution of Infection Signs and Symptoms  3/4/2024 0508 by Guerda Velasquez RN  Outcome: Ongoing, Progressing  3/4/2024 0507 by Guerda Velasquez RN  Outcome: Ongoing, Progressing     Problem: Respiratory Compromise (Pneumonia)  Goal: Effective Oxygenation and Ventilation  3/4/2024 0508 by Guerda Velasquez RN  Outcome: Ongoing, Progressing  3/4/2024 0507 by Guerda Velasquez RN  Outcome: Ongoing, Progressing     Problem: Skin Injury Risk Increased  Goal: Skin Health and Integrity  3/4/2024 0508 by Velasquez, Guerda, RN  Outcome: Ongoing, Progressing  3/4/2024 0507 by Guerda Velasquez,  RN  Outcome: Ongoing, Progressing     Problem: Fall Injury Risk  Goal: Absence of Fall and Fall-Related Injury  3/4/2024 0508 by Guerda Velasquez RN  Outcome: Ongoing, Progressing  3/4/2024 0507 by Guerda Velasquez RN  Outcome: Ongoing, Progressing     Problem: Impaired Wound Healing  Goal: Optimal Wound Healing  3/4/2024 0508 by Guerda Velasquez RN  Outcome: Ongoing, Progressing  3/4/2024 0507 by Guerda Velasquez RN  Outcome: Ongoing, Progressing

## 2024-03-04 NOTE — NURSING
Pt.'s CBG was 128 and she is due for 40units of 70/30 insulin. Spoke to Dr. Fajardo who said to give 20units instead.

## 2024-03-04 NOTE — ASSESSMENT & PLAN NOTE
Patient with Hypoxic Respiratory failure which is Acute.  she is not on home oxygen. Supplemental oxygen was provided and noted- Oxygen Concentration (%):  [30] 30    .   Signs/symptoms of respiratory failure include- tachypnea, increased work of breathing, respiratory distress, and use of accessory muscles. Contributing diagnoses includes - CHF Labs and images were reviewed. Patient Has recent ABG, which has been reviewed. Will treat underlying causes and adjust management of respiratory failure as follows-     - wean oxygen

## 2024-03-04 NOTE — ASSESSMENT & PLAN NOTE
Chronic, uncontrolled. Latest blood pressure and vitals reviewed-     Temp:  [97.6 °F (36.4 °C)-98.9 °F (37.2 °C)]   Pulse:  [73-99]   Resp:  [19-38]   BP: ()/(55-83)   SpO2:  [74 %-99 %] .   Home meds for hypertension were reviewed and noted below.   Hypertension Medications               diltiaZEM (CARDIZEM CD) 120 MG Cp24 Take 1 capsule (120 mg total) by mouth once daily.    furosemide (LASIX) 40 MG tablet Take 40 mg by mouth once daily.     metoprolol succinate (TOPROL-XL) 100 MG 24 hr tablet Take 1 tablet (100 mg total) by mouth once daily.    nitroGLYCERIN (NITROSTAT) 0.4 MG SL tablet Place 0.4 mg under the tongue every 5 (five) minutes as needed for Chest pain.            While in the hospital, will manage blood pressure as follows; Adjust home antihypertensive regimen as follows- Cont metoprolol. Added PRN hydralazine. Holding Cardizem until echo comes back and EF was determined.     Will utilize p.r.n. blood pressure medication only if patient's blood pressure greater than 160/100 and she develops symptoms such as worsening chest pain or shortness of breath.

## 2024-03-04 NOTE — SUBJECTIVE & OBJECTIVE
Interval History: Patient is feeling well, working with therapy. Denies any complains. SOB improved. No leg swelling.     Review of Systems  Objective:     Vital Signs (Most Recent):  Temp: 98 °F (36.7 °C) (03/04/24 1154)  Pulse: 82 (03/04/24 1154)  Resp: (!) 29 (03/04/24 1154)  BP: 120/64 (03/04/24 1101)  SpO2: 96 % (03/04/24 1154) Vital Signs (24h Range):  Temp:  [97.6 °F (36.4 °C)-98.9 °F (37.2 °C)] 98 °F (36.7 °C)  Pulse:  [73-99] 82  Resp:  [19-38] 29  SpO2:  [74 %-99 %] 96 %  BP: ()/(55-83) 120/64     Weight: 82.5 kg (181 lb 14.1 oz)  Body mass index is 35.52 kg/m².    Intake/Output Summary (Last 24 hours) at 3/4/2024 1214  Last data filed at 3/3/2024 1250  Gross per 24 hour   Intake 300 ml   Output 800 ml   Net -500 ml         Physical Exam  Vitals and nursing note reviewed.   Constitutional:       General: She is not in acute distress.     Appearance: She is obese.   HENT:      Head: Atraumatic.      Mouth/Throat:      Mouth: Mucous membranes are moist.      Pharynx: Oropharynx is clear.   Eyes:      General: No scleral icterus.     Conjunctiva/sclera: Conjunctivae normal.      Pupils: Pupils are equal, round, and reactive to light.   Cardiovascular:      Rate and Rhythm: Normal rate and regular rhythm.      Heart sounds: No murmur heard.  Pulmonary:      Effort: No respiratory distress.      Breath sounds: No wheezing, rhonchi or rales.   Abdominal:      General: Abdomen is flat. Bowel sounds are normal.      Palpations: Abdomen is soft.   Musculoskeletal:         General: No swelling or deformity.      Cervical back: No rigidity or tenderness.   Skin:     Coloration: Skin is not jaundiced or pale.      Findings: No bruising, erythema or rash.   Neurological:      General: No focal deficit present.      Mental Status: She is alert and oriented to person, place, and time.      Cranial Nerves: No cranial nerve deficit.      Sensory: No sensory deficit.      Motor: No weakness.   Psychiatric:          Mood and Affect: Mood normal.         Behavior: Behavior normal.             Significant Labs: All pertinent labs within the past 24 hours have been reviewed.  CBC:   Recent Labs   Lab 03/03/24 0307 03/04/24  0337   WBC 8.29 6.87   HGB 12.6 12.5   HCT 38.7 37.7   * 160     CMP:   Recent Labs   Lab 03/03/24 0307 03/04/24 0337    138   K 3.8 3.9    97   CO2 29 34*   GLU 73 182*   BUN 11 18   CREATININE 1.0 1.6*   CALCIUM 9.4 9.8   PROT 7.2 7.0   ALBUMIN 3.9 3.7   BILITOT 0.8 0.7   ALKPHOS 65 72   AST 23 23   ALT 11 12   ANIONGAP 8 7*       Significant Imaging: I have reviewed all pertinent imaging results/findings within the past 24 hours.

## 2024-03-04 NOTE — ASSESSMENT & PLAN NOTE
Euvolemic today and Cr up trended.   Echo shows normal systolic and diastolic function   - Holding diuresis   - Strict I/O

## 2024-03-04 NOTE — PROGRESS NOTES
[facility] Medicine  Progress Note    Patient Name: Milly Lee  MRN: [id number]  Patient Class: IP- Inpatient   Admission Date: 3/2/2024  Length of Stay: 2 days  Attending Physician: Adán Fajardo MD  Primary Care Provider: Michela Christian MD        Subjective:     Principal Problem:Acute hypoxemic respiratory failure        HPI:  88 year old pt getting admitted with a cute hypoxic resp failure and acute CHF flare  History is limited because pt is a very poor historian  Per pt she started having shortness of breath since yesterday  Later she noticed SOB on exertion and today AM she had SOB on rest  She came to hospital and got admitted  Denies fever/chest pain or other issues   Pt was admitted to ICU because of resp distress and in need of HF O2 in ER  After diuresis pt felt better  Case discussed with Dr Grant and decision was made to hold any antibiotics at this point      Pt had CABG in 2009  Pt had LHC in Feb 2018 with no PCI  Pt had Normal stress test on November 2023  Pt had TAVR in March 2018  Pt had EGD with dilatation of Esophagus in August 2023  She last saw her Cardiologist :Dr Benitez in [place] clinic on August 2023    Overview/Hospital Course:  This is a 88 year old female who was admitted with acute hypoxic respiratory failure due to acute on chronic HFpEF. Patient has been diuresed well with IV lasix with > 2 L urine output on the first day. On admission she was on high flow NC needing admission to ICU. Now she is weaned to 5 L, she is feeling better, hemodynamically stable. Echo was done, pending report. Patient can be transferred out of ICU, PT, OT consulted. On 3/4 Cr up trended and Lasix was held.     Interval History: Patient is feeling well, working with therapy. Denies any complains. SOB improved. No leg swelling.     Review of Systems  Objective:     Vital Signs (Most Recent):  Temp: 98 °F (36.7 °C) (03/04/24 1154)  Pulse: 82 (03/04/24  1154)  Resp: (!) 29 (03/04/24 1154)  BP: 120/64 (03/04/24 1101)  SpO2: 96 % (03/04/24 1154) Vital Signs (24h Range):  Temp:  [97.6 °F (36.4 °C)-98.9 °F (37.2 °C)] 98 °F (36.7 °C)  Pulse:  [73-99] 82  Resp:  [19-38] 29  SpO2:  [74 %-99 %] 96 %  BP: ()/(55-83) 120/64     Weight: 82.5 kg (181 lb 14.1 oz)  Body mass index is 35.52 kg/m².    Intake/Output Summary (Last 24 hours) at 3/4/2024 1214  Last data filed at 3/3/2024 1250  Gross per 24 hour   Intake 300 ml   Output 800 ml   Net -500 ml         Physical Exam  Vitals and nursing note reviewed.   Constitutional:       General: She is not in acute distress.     Appearance: She is obese.   HENT:      Head: Atraumatic.      Mouth/Throat:      Mouth: Mucous membranes are moist.      Pharynx: Oropharynx is clear.   Eyes:      General: No scleral icterus.     Conjunctiva/sclera: Conjunctivae normal.      Pupils: Pupils are equal, round, and reactive to light.   Cardiovascular:      Rate and Rhythm: Normal rate and regular rhythm.      Heart sounds: No murmur heard.  Pulmonary:      Effort: No respiratory distress.      Breath sounds: No wheezing, rhonchi or rales.   Abdominal:      General: Abdomen is flat. Bowel sounds are normal.      Palpations: Abdomen is soft.   Musculoskeletal:         General: No swelling or deformity.      Cervical back: No rigidity or tenderness.   Skin:     Coloration: Skin is not jaundiced or pale.      Findings: No bruising, erythema or rash.   Neurological:      General: No focal deficit present.      Mental Status: She is alert and oriented to person, place, and time.      Cranial Nerves: No cranial nerve deficit.      Sensory: No sensory deficit.      Motor: No weakness.   Psychiatric:         Mood and Affect: Mood normal.         Behavior: Behavior normal.             Significant Labs: All pertinent labs within the past 24 hours have been reviewed.  CBC:   Recent Labs   Lab 03/03/24  0307 03/04/24  0337   WBC 8.29 6.87   HGB 12.6 12.5    HCT 38.7 37.7   * 160     CMP:   Recent Labs   Lab 03/03/24  0307 03/04/24  0337    138   K 3.8 3.9    97   CO2 29 34*   GLU 73 182*   BUN 11 18   CREATININE 1.0 1.6*   CALCIUM 9.4 9.8   PROT 7.2 7.0   ALBUMIN 3.9 3.7   BILITOT 0.8 0.7   ALKPHOS 65 72   AST 23 23   ALT 11 12   ANIONGAP 8 7*       Significant Imaging: I have reviewed all pertinent imaging results/findings within the past 24 hours.    Assessment/Plan:      * Acute hypoxemic respiratory failure  Patient with Hypoxic Respiratory failure which is Acute.  she is not on home oxygen. Supplemental oxygen was provided and noted- Oxygen Concentration (%):  [30] 30    .   Signs/symptoms of respiratory failure include- tachypnea, increased work of breathing, respiratory distress, and use of accessory muscles. Contributing diagnoses includes - CHF Labs and images were reviewed. Patient Has recent ABG, which has been reviewed. Will treat underlying causes and adjust management of respiratory failure as follows-     - wean oxygen     Acute on chronic diastolic (congestive) heart failure  Euvolemic today and Cr up trended.   Echo shows normal systolic and diastolic function   - Holding diuresis   - Strict I/O    DAVID (acute kidney injury) on CKD III  Patient with acute kidney injury/acute renal failure likely due to pre-renal azotemia due to IVVD DAVID is currently stable. Baseline creatinine  1  - Labs reviewed- Renal function/electrolytes with Estimated Creatinine Clearance: 23.6 mL/min (A) (based on SCr of 1.6 mg/dL (H)). according to latest data. Monitor urine output and serial BMP and adjust therapy as needed. Avoid nephrotoxins and renally dose meds for GFR listed above.    Likely due to overdiuresis   - Holding Lasix today  - rpt BMP AM, if not improving consider IVF challenge     Longstanding persistent atrial fibrillation  Secondary hypercoagulable state   - Maintain home medications    CAD (coronary artery disease)  Aware  As per  HPI    Ascending aortic aneurysm  Not a candidate for Sx as per records  Aggressive BP control needed      Type 2 diabetes mellitus with stage 3 chronic kidney disease, with long-term current use of insulin  Maintain SSI and other insulin regime    Cardiac pacemaker in situ  Aware       H/O prosthetic aortic valve replacement  Aware   Bovine valve       Uncontrolled hypertension  Chronic, better controlled today. Latest blood pressure and vitals reviewed-     Temp:  [97.6 °F (36.4 °C)-98.9 °F (37.2 °C)]   Pulse:  [73-99]   Resp:  [19-38]   BP: ()/(55-83)   SpO2:  [74 %-99 %] .   Home meds for hypertension were reviewed and noted below.   Hypertension Medications               diltiaZEM (CARDIZEM CD) 120 MG Cp24 Take 1 capsule (120 mg total) by mouth once daily.    furosemide (LASIX) 40 MG tablet Take 40 mg by mouth once daily.     metoprolol succinate (TOPROL-XL) 100 MG 24 hr tablet Take 1 tablet (100 mg total) by mouth once daily.    nitroGLYCERIN (NITROSTAT) 0.4 MG SL tablet Place 0.4 mg under the tongue every 5 (five) minutes as needed for Chest pain.            While in the hospital, will manage blood pressure as follows; Adjust home antihypertensive regimen as follows- Cont metoprolol. Added PRN hydralazine. Holding Cardizem until echo comes back and EF was determined.     Will utilize p.r.n. blood pressure medication only if patient's blood pressure greater than 160/100 and she develops symptoms such as worsening chest pain or shortness of breath.      VTE Risk Mitigation (From admission, onward)           Ordered     apixaban tablet 5 mg  2 times daily         03/02/24 1254     IP VTE HIGH RISK PATIENT  Once         03/02/24 1254     Place sequential compression device  Until discontinued         03/02/24 1254                    Discharge Planning   ELDA:      Code Status: DNR   Is the patient medically ready for discharge?:     Reason for patient still in hospital (select all that apply): Treatment and  PT / OT recommendations  Discharge Plan A: Home with family          Transfer out of the ICU.       Adán Fajardo MD  Department of Hospital Medicine   [facility]

## 2024-03-04 NOTE — PLAN OF CARE
Pt reported feeling rested and much better then the previous day.  Patient's evening blood glucose 128.  Md notified and insulin was adjusted to 20 units one time.  Pt educated on diet and is waiting on transfer. Will continue to monitor.       Problem: Adult Inpatient Plan of Care  Goal: Plan of Care Review  Outcome: Adequate for Care Transition  Goal: Patient-Specific Goal (Individualized)  Outcome: Adequate for Care Transition  Goal: Absence of Hospital-Acquired Illness or Injury  Outcome: Adequate for Care Transition  Goal: Optimal Comfort and Wellbeing  Outcome: Adequate for Care Transition  Goal: Readiness for Transition of Care  Outcome: Adequate for Care Transition     Problem: Diabetes Comorbidity  Goal: Blood Glucose Level Within Targeted Range  Outcome: Adequate for Care Transition     Problem: Fluid Imbalance (Pneumonia)  Goal: Fluid Balance  Outcome: Adequate for Care Transition     Problem: Infection (Pneumonia)  Goal: Resolution of Infection Signs and Symptoms  Outcome: Adequate for Care Transition     Problem: Respiratory Compromise (Pneumonia)  Goal: Effective Oxygenation and Ventilation  Outcome: Adequate for Care Transition     Problem: Skin Injury Risk Increased  Goal: Skin Health and Integrity  Outcome: Adequate for Care Transition     Problem: Fall Injury Risk  Goal: Absence of Fall and Fall-Related Injury  Outcome: Adequate for Care Transition     Problem: Impaired Wound Healing  Goal: Optimal Wound Healing  Outcome: Adequate for Care Transition     Problem: Fluid and Electrolyte Imbalance (Acute Kidney Injury/Impairment)  Goal: Fluid and Electrolyte Balance  Outcome: Adequate for Care Transition     Problem: Oral Intake Inadequate (Acute Kidney Injury/Impairment)  Goal: Optimal Nutrition Intake  Outcome: Adequate for Care Transition     Problem: Renal Function Impairment (Acute Kidney Injury/Impairment)  Goal: Effective Renal Function  Outcome: Adequate for Care Transition

## 2024-03-04 NOTE — PLAN OF CARE
03/04/24 0730   Patient Assessment/Suction   Level of Consciousness (AVPU)   (asleep)   Respiratory Effort Unlabored   PRE-TX-O2   Device (Oxygen Therapy) BIPAP   $ Is the patient on High Flow Oxygen? Yes   Oxygen Concentration (%) 30   SpO2 99 %   Pulse Oximetry Type Continuous   $ Pulse Oximetry - Multiple Charge Pulse Oximetry - Multiple   Pulse 82   Resp (!) 28   Ready to Wean/Extubation Screen   FIO2<=50 (chart decimal) 0.3   Preset CPAP/BiPAP Settings   Mode Of Delivery BiPAP S/T   $ CPAP/BiPAP Daily Charge BiPAP/CPAP Daily   Size of Mask Medium/Large   Equipment Type V60   Ipap 10   EPAP (cm H2O) 5   Pressure Support (cm H2O) 5   ITime (sec) 1   Rise Time (sec) 3   Patient CPAP/BiPAP Settings   RR Total (Breaths/Min) 28   Tidal Volume (mL) 651   VE Minute Ventilation (L/min) 18.2 L/min   Peak Inspiratory Pressure (cm H2O) 9   TiTOT (%) 36   Total Leak (L/Min) 14   Patient Trigger - ST Mode Only (%) 99   CPAP/BiPAP Backup Settings   Backup Rate 10 breaths per minute (bpm)   CPAP/BiPAP Alarms   High Pressure (cm H2O) 40   Low Pressure (cm H2O) 5   Minute Ventilation (L/Min) 2   High RR (breaths/min) 40   Low RR (breaths/min) 8   Apnea (Sec) 20

## 2024-03-04 NOTE — PROGRESS NOTES
Pharmacist Renal Dose Adjustment Note    Milly Lee is a 88 y.o. female being treated with the medication Apixaban    Patient Data:    Vital Signs (Most Recent):  Temp: 98 °F (36.7 °C) (03/04/24 1154)  Pulse: 84 (03/04/24 1300)  Resp: (!) 28 (03/04/24 1300)  BP: 131/85 (03/04/24 1200)  SpO2: (!) 92 % (03/04/24 1300) Vital Signs (72h Range):  Temp:  [97.3 °F (36.3 °C)-98.9 °F (37.2 °C)]   Pulse:  []   Resp:  [18-40]   BP: ()/(55-90)   SpO2:  [74 %-99 %]        Ht: 5' (1.524 m)  Wt: 82.5 kg (181 lb 14.1 oz)  Estimated Creatinine Clearance: 23.6 mL/min (A) (based on SCr of 1.6 mg/dL (H)).  Body mass index is 35.52 kg/m².    Per [facility] renal dosing protocol:     Previous Order: Apixaban 5 mg PO BID    Will be changed to:     New Order: Apixaban 2.5 mg PO BID,    Due to: age > 80 + Scr > 1.5    Renal dose adjustments performed as noted above.    We will continue monitoring and adjusting as necessary.    Pharmacist: Spike Mayer, PharmD  Ext: [phone]

## 2024-03-04 NOTE — PLAN OF CARE
Problem: Physical Therapy  Goal: Physical Therapy Goal  Description: 1. Supine to sit with Stand-by Assistance  2. Sit to stand transfer with Stand-by Assistance  3.. Bed to chair transfer with Supervision using Rolling Walker  4. Gait  x 100 feet with Stand By Assistance using Rolling Walker.     Outcome: Ongoing, Progressing

## 2024-03-05 PROBLEM — E87.20 LACTIC ACID ACIDOSIS: Status: ACTIVE | Noted: 2024-03-05

## 2024-03-05 PROBLEM — Z71.89 ADVANCE CARE PLANNING: Status: ACTIVE | Noted: 2024-03-05

## 2024-03-05 PROBLEM — G47.33 OSA (OBSTRUCTIVE SLEEP APNEA): Status: ACTIVE | Noted: 2024-03-05

## 2024-03-05 LAB
ALBUMIN SERPL BCP-MCNC: 3.5 G/DL (ref 3.5–5.2)
ALP SERPL-CCNC: 68 U/L (ref 55–135)
ALT SERPL W/O P-5'-P-CCNC: 11 U/L (ref 10–44)
ANION GAP SERPL CALC-SCNC: 7 MMOL/L (ref 8–16)
AST SERPL-CCNC: 25 U/L (ref 10–40)
BASOPHILS # BLD AUTO: 0.08 K/UL (ref 0–0.2)
BASOPHILS NFR BLD: 1.2 % (ref 0–1.9)
BILIRUB SERPL-MCNC: 0.5 MG/DL (ref 0.1–1)
BUN SERPL-MCNC: 17 MG/DL (ref 8–23)
CALCIUM SERPL-MCNC: 9.6 MG/DL (ref 8.7–10.5)
CHLORIDE SERPL-SCNC: 99 MMOL/L (ref 95–110)
CO2 SERPL-SCNC: 31 MMOL/L (ref 23–29)
CREAT SERPL-MCNC: 1 MG/DL (ref 0.5–1.4)
D DIMER PPP IA.FEU-MCNC: 1.74 MG/L FEU (ref 0–0.49)
DIFFERENTIAL METHOD BLD: ABNORMAL
EOSINOPHIL # BLD AUTO: 0.2 K/UL (ref 0–0.5)
EOSINOPHIL NFR BLD: 3.2 % (ref 0–8)
ERYTHROCYTE [DISTWIDTH] IN BLOOD BY AUTOMATED COUNT: 16.2 % (ref 11.5–14.5)
EST. GFR  (NO RACE VARIABLE): 54.5 ML/MIN/1.73 M^2
ESTIMATED AVG GLUCOSE: 131 MG/DL (ref 68–131)
GLUCOSE SERPL-MCNC: 126 MG/DL (ref 70–110)
GLUCOSE SERPL-MCNC: 131 MG/DL (ref 70–110)
GLUCOSE SERPL-MCNC: 146 MG/DL (ref 70–110)
GLUCOSE SERPL-MCNC: 178 MG/DL (ref 70–110)
GLUCOSE SERPL-MCNC: 227 MG/DL (ref 70–110)
GLUCOSE SERPL-MCNC: 261 MG/DL (ref 70–110)
HBA1C MFR BLD: 6.2 % (ref 4.5–6.2)
HCT VFR BLD AUTO: 36.6 % (ref 37–48.5)
HGB BLD-MCNC: 12.1 G/DL (ref 12–16)
IMM GRANULOCYTES # BLD AUTO: 0.01 K/UL (ref 0–0.04)
IMM GRANULOCYTES NFR BLD AUTO: 0.2 % (ref 0–0.5)
LYMPHOCYTES # BLD AUTO: 1.6 K/UL (ref 1–4.8)
LYMPHOCYTES NFR BLD: 24.4 % (ref 18–48)
MAGNESIUM SERPL-MCNC: 1.9 MG/DL (ref 1.6–2.6)
MCH RBC QN AUTO: 31.2 PG (ref 27–31)
MCHC RBC AUTO-ENTMCNC: 33.1 G/DL (ref 32–36)
MCV RBC AUTO: 94 FL (ref 82–98)
MONOCYTES # BLD AUTO: 0.8 K/UL (ref 0.3–1)
MONOCYTES NFR BLD: 11.6 % (ref 4–15)
NEUTROPHILS # BLD AUTO: 3.9 K/UL (ref 1.8–7.7)
NEUTROPHILS NFR BLD: 59.4 % (ref 38–73)
NRBC BLD-RTO: 0 /100 WBC
PLATELET # BLD AUTO: 163 K/UL (ref 150–450)
PMV BLD AUTO: 11 FL (ref 9.2–12.9)
POTASSIUM SERPL-SCNC: 3.6 MMOL/L (ref 3.5–5.1)
PROT SERPL-MCNC: 6.5 G/DL (ref 6–8.4)
RBC # BLD AUTO: 3.88 M/UL (ref 4–5.4)
SODIUM SERPL-SCNC: 137 MMOL/L (ref 136–145)
WBC # BLD AUTO: 6.63 K/UL (ref 3.9–12.7)

## 2024-03-05 PROCEDURE — 99900031 HC PATIENT EDUCATION (STAT)

## 2024-03-05 PROCEDURE — 25000003 PHARM REV CODE 250: Performed by: INTERNAL MEDICINE

## 2024-03-05 PROCEDURE — 85379 FIBRIN DEGRADATION QUANT: CPT | Performed by: HOSPITALIST

## 2024-03-05 PROCEDURE — 63600175 PHARM REV CODE 636 W HCPCS: Performed by: INTERNAL MEDICINE

## 2024-03-05 PROCEDURE — 94618 PULMONARY STRESS TESTING: CPT

## 2024-03-05 PROCEDURE — 85025 COMPLETE CBC W/AUTO DIFF WBC: CPT | Performed by: INTERNAL MEDICINE

## 2024-03-05 PROCEDURE — 25000003 PHARM REV CODE 250: Mod: UD | Performed by: HOSPITALIST

## 2024-03-05 PROCEDURE — 97530 THERAPEUTIC ACTIVITIES: CPT | Mod: CQ

## 2024-03-05 PROCEDURE — 94760 N-INVAS EAR/PLS OXIMETRY 1: CPT

## 2024-03-05 PROCEDURE — 25500020 PHARM REV CODE 255: Performed by: HOSPITALIST

## 2024-03-05 PROCEDURE — 97530 THERAPEUTIC ACTIVITIES: CPT

## 2024-03-05 PROCEDURE — 12000002 HC ACUTE/MED SURGE SEMI-PRIVATE ROOM

## 2024-03-05 PROCEDURE — 94761 N-INVAS EAR/PLS OXIMETRY MLT: CPT

## 2024-03-05 PROCEDURE — 27000221 HC OXYGEN, UP TO 24 HOURS

## 2024-03-05 PROCEDURE — 80053 COMPREHEN METABOLIC PANEL: CPT | Performed by: INTERNAL MEDICINE

## 2024-03-05 PROCEDURE — 83036 HEMOGLOBIN GLYCOSYLATED A1C: CPT | Performed by: INTERNAL MEDICINE

## 2024-03-05 PROCEDURE — 36415 COLL VENOUS BLD VENIPUNCTURE: CPT | Performed by: INTERNAL MEDICINE

## 2024-03-05 PROCEDURE — 99900035 HC TECH TIME PER 15 MIN (STAT)

## 2024-03-05 PROCEDURE — 97116 GAIT TRAINING THERAPY: CPT | Mod: CQ

## 2024-03-05 PROCEDURE — 83735 ASSAY OF MAGNESIUM: CPT | Performed by: INTERNAL MEDICINE

## 2024-03-05 RX ORDER — SODIUM CHLORIDE 9 MG/ML
INJECTION, SOLUTION INTRAVENOUS ONCE
Status: COMPLETED | OUTPATIENT
Start: 2024-03-05 | End: 2024-03-05

## 2024-03-05 RX ADMIN — METOPROLOL SUCCINATE 100 MG: 50 TABLET, EXTENDED RELEASE ORAL at 08:03

## 2024-03-05 RX ADMIN — INSULIN ASPART 2 UNITS: 100 INJECTION, SOLUTION INTRAVENOUS; SUBCUTANEOUS at 12:03

## 2024-03-05 RX ADMIN — HUMAN INSULIN 40 UNITS: 100 INJECTION, SUSPENSION SUBCUTANEOUS at 04:03

## 2024-03-05 RX ADMIN — APIXABAN 2.5 MG: 2.5 TABLET, FILM COATED ORAL at 08:03

## 2024-03-05 RX ADMIN — IOHEXOL 100 ML: 350 INJECTION, SOLUTION INTRAVENOUS at 06:03

## 2024-03-05 RX ADMIN — SODIUM CHLORIDE: 9 INJECTION, SOLUTION INTRAVENOUS at 05:03

## 2024-03-05 RX ADMIN — DIGOXIN 125 MCG: 125 TABLET ORAL at 08:03

## 2024-03-05 RX ADMIN — Medication 400 MG: at 08:03

## 2024-03-05 RX ADMIN — MUPIROCIN 1 G: 20 OINTMENT TOPICAL at 08:03

## 2024-03-05 RX ADMIN — INSULIN ASPART 4 UNITS: 100 INJECTION, SOLUTION INTRAVENOUS; SUBCUTANEOUS at 04:03

## 2024-03-05 RX ADMIN — DULOXETINE HYDROCHLORIDE 30 MG: 30 CAPSULE, DELAYED RELEASE ORAL at 08:03

## 2024-03-05 RX ADMIN — FAMOTIDINE 20 MG: 20 TABLET ORAL at 08:03

## 2024-03-05 RX ADMIN — INSULIN ASPART 3 UNITS: 100 INJECTION, SOLUTION INTRAVENOUS; SUBCUTANEOUS at 08:03

## 2024-03-05 RX ADMIN — ASPIRIN 81 MG: 81 TABLET, COATED ORAL at 08:03

## 2024-03-05 NOTE — PROGRESS NOTES
[facility]  [facility]    Patient Name: Milly Lee  MRN: [id number]  Patient Class: IP- Inpatient   Admission Date: 3/2/2024  Length of Stay: 3 days  Attending Physician: Isaiah Zambrano MD  Primary Care Provider: Michela Christian MD        Subjective:     Principal Problem:Acute hypoxemic respiratory failure        HPI:  88 year old pt getting admitted with a cute hypoxic resp failure and acute CHF flare  History is limited because pt is a very poor historian  Per pt she started having shortness of breath since yesterday  Later she noticed SOB on exertion and today AM she had SOB on rest  She came to hospital and got admitted  Denies fever/chest pain or other issues   Pt was admitted to ICU because of resp distress and in need of HF O2 in ER  After diuresis pt felt better  Case discussed with Dr Grant and decision was made to hold any antibiotics at this point      Pt had CABG in 2009  Pt had LHC in Feb 2018 with no PCI  Pt had Normal stress test on November 2023  Pt had TAVR in March 2018  Pt had EGD with dilatation of Esophagus in August 2023  She last saw her Cardiologist :Dr Benitez in [place] clinic on August 2023    Overview/Hospital Course:  This is a 88 year old female who was admitted with acute hypoxic respiratory failure due to acute on chronic HFpEF. Patient has been diuresed well with IV lasix with > 2 L urine output on the first day. On admission she was on high flow NC needing admission to ICU. Now she is weaned to 5 L, she is feeling better, hemodynamically stable. Echo was done, pending report. Patient can be transferred out of ICU, PT, OT consulted. On 3/4 Cr up trended and Lasix was held.   3/5/24 pt feeling well and eager to go, but she did desat to 80s while lying in bed off O2 - has not previously required O2 - will need RT to test for home O2 and may require a bit more tuning up prior to d/c home.  She does admit to eating a lot of salty  chips prior to onset of acute SOB, has noted PAINTER going to bathroom lately.      Interval History:     3/5/24  Assumed care today, pt seen and examined, chart reviewed.  See Hospital Course.      Review of Systems    11 pt ROS negative except as noted o/w    Objective:     Vital Signs (Most Recent):  Temp: 97.8 °F (36.6 °C) (03/05/24 1219)  Pulse: 75 (03/05/24 1219)  Resp: 18 (03/05/24 1219)  BP: (!) 151/64 (03/05/24 1219)  SpO2: (!) 93 % (03/05/24 1219) Vital Signs (24h Range):  Temp:  [97.8 °F (36.6 °C)-98.5 °F (36.9 °C)] 97.8 °F (36.6 °C)  Pulse:  [75-97] 75  Resp:  [16-37] 18  SpO2:  [90 %-95 %] 93 %  BP: ()/(59-78) 151/64     Weight: 82.5 kg (181 lb 14.1 oz)  Body mass index is 35.52 kg/m².    Intake/Output Summary (Last 24 hours) at 3/5/2024 1419  Last data filed at 3/5/2024 0554  Gross per 24 hour   Intake 120 ml   Output 1000 ml   Net -880 ml         Physical Exam      General:  A&O, NAD  HEENT: neck supple, PERRL/EOMI, EAC clear bilaterally, normal bilateral carotid upstroke w/o bruits, inf turbs clear bilaterally, OC/OP clear, Mallampati D airway  Lungs: CTAB  CV: RRR w/ faint murmur LSB  Abdomen: soft, NTND, no HSM, no bruits/masses/pulsations, obese  Ext: no edema  : def  Rectal: def  Neuro: NF    Significant Labs: All pertinent labs within the past 24 hours have been reviewed.    Significant Imaging: I have reviewed all pertinent imaging results/findings within the past 24 hours.    Assessment/Plan:      * Acute hypoxemic respiratory failure  In setting of acute HFpEF exacerbation possibly precipitated by underlying TRISTEN/OHS, uncontrolled HTN, ingestion of salty potato chips  Lung clear today, pt still drops sats lying in bed  Does have PAINTER walking to bathroom  Obtain d-dimer though suspicion low for PE, if + consider CTA w/ some IVF  RT to determine O2 requirements  Continue diuresis  CXRs reviewed      Advance care planning  DNR      Lactic acid acidosis  Mild, per pulm ? Beta agonist related,  these were stopped        TRISTEN (obstructive sleep apnea)  Noted to have hypercapnea probably related to TRISTEN/OHS on admission per pulm  BiPAP w/ sleep   Pt reports being on NIPPV at home 5 yrs ago, quit using it, didn't like the sounds it made  She lost 70# since then w/ TLC  Still needs f/u for this - OPT PSG as soon as possible upon d/c        DAVID (acute kidney injury)  Improved after holding Lasix  Normal u/a  Avoid nephrotoxins as we are able        Acute on chronic diastolic (congestive) heart failure  TTE as noted  Responded well to diuresis but did bump Cr which has resolved today  As above re: OHS/TRISTEN etc      Longstanding persistent atrial fibrillation  Home meds as appropriate  Rate is ok  Does not sound like she's currently in AF    CKD (chronic kidney disease), stage III  Cr improved today from 1.6 to 1.0  Avoid nephrotoxins  Gentle diuresis  Trend      CAD (coronary artery disease)  Home meds as appropriate    Ascending aortic aneurysm  Not a candidate for Sx as per records  Aggressive BP control needed  Aortic root noted on TTE      Type 2 diabetes mellitus with stage 3 chronic kidney disease, with long-term current use of insulin  Update A1c  Maintain SSI and scheduled insulin      Cardiac pacemaker in situ  No acute issues        H/O prosthetic aortic valve replacement  TTE 3/3/24    Left Ventricle: The left ventricle is normal in size. Mildly increased wall thickness. There is mild concentric hypertrophy. Normal wall motion. There is normal systolic function with a visually estimated ejection fraction of 60 - 65%. There is normal diastolic function.    Right Ventricle: Normal right ventricular cavity size. Wall thickness is normal. Right ventricle wall motion  is normal. Systolic function is normal.    Aortic Valve: Not well visualized due to poor acoustic window. 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 It is reported to be a 26 mm Copeland valve. S3 Motion is not  well-visualized Aortic valve area by VTI is 0.82 cm². Aortic valve peak velocity is 3.74 m/s. Mean gradient is 31 mmHg. The dimensionless index is 0.26. There is no significant regurgitation.    Aorta: Aortic root is normal in size. Ascending aorta is mildly to moderately dilated measuring 4.50 cm.    IVC/SVC: Normal venous pressure at 3 mmHg.      Uncontrolled hypertension  Home meds as appropriate  Improved control  Likely has underlying TRISTEN/OHS and needs further OPT f/u for this      VTE Risk Mitigation (From admission, onward)           Ordered     apixaban tablet 2.5 mg  2 times daily        Note to Pharmacy: Original order 5 mg BID    03/04/24 1428     IP VTE HIGH RISK PATIENT  Once         03/02/24 1254     Place sequential compression device  Until discontinued         03/02/24 1254                    Discharge Planning   ELDA: 3/6/2024     Code Status: DNR   Is the patient medically ready for discharge?:     Reason for patient still in hospital (select all that apply): Patient trending condition  Discharge Plan A: Home with family        Time spent in direct patient care, review of data, conversation w/ patient and/or family, and moderately complex MDM 40 minutes            Isaiah Zambrano MD  Department of Hospital Medicine   [facility]

## 2024-03-05 NOTE — CARE UPDATE
03/05/24 0838   Patient Assessment/Suction   Level of Consciousness (AVPU) alert   Respiratory Effort Normal;Unlabored   Expansion/Accessory Muscles/Retractions no use of accessory muscles;expansion symmetric;no retractions   All Lung Fields Breath Sounds diminished   Rhythm/Pattern, Respiratory pattern regular;depth regular   Cough Frequency infrequent   PRE-TX-O2   Device (Oxygen Therapy) nasal cannula   $ Is the patient on Low Flow Oxygen? Yes   Flow (L/min) 2   SpO2 95 %   Pulse Oximetry Type Intermittent   $ Pulse Oximetry - Single Charge Pulse Oximetry - Single   Pulse 79   Resp 18   Preset CPAP/BiPAP Settings   Mode Of Delivery BiPAP;Standby  (PT DID NOT WEAR BIPAP OVERNIGHT)   Education   $ Education 15 min;BiPAP;Other (see comment)  (PO CHECK)

## 2024-03-05 NOTE — SUBJECTIVE & OBJECTIVE
Interval History:     3/5/24  Assumed care today, pt seen and examined, chart reviewed.  See Hospital Course.      Review of Systems    11 pt ROS negative except as noted o/w    Objective:     Vital Signs (Most Recent):  Temp: 97.8 °F (36.6 °C) (03/05/24 1219)  Pulse: 75 (03/05/24 1219)  Resp: 18 (03/05/24 1219)  BP: (!) 151/64 (03/05/24 1219)  SpO2: (!) 93 % (03/05/24 1219) Vital Signs (24h Range):  Temp:  [97.8 °F (36.6 °C)-98.5 °F (36.9 °C)] 97.8 °F (36.6 °C)  Pulse:  [75-97] 75  Resp:  [16-37] 18  SpO2:  [90 %-95 %] 93 %  BP: ()/(59-78) 151/64     Weight: 82.5 kg (181 lb 14.1 oz)  Body mass index is 35.52 kg/m².    Intake/Output Summary (Last 24 hours) at 3/5/2024 1419  Last data filed at 3/5/2024 0554  Gross per 24 hour   Intake 120 ml   Output 1000 ml   Net -880 ml         Physical Exam      General:  A&O, NAD  HEENT: neck supple, PERRL/EOMI, EAC clear bilaterally, normal bilateral carotid upstroke w/o bruits, inf turbs clear bilaterally, OC/OP clear, Mallampati D airway  Lungs: CTAB  CV: RRR w/ faint murmur LSB  Abdomen: soft, NTND, no HSM, no bruits/masses/pulsations, obese  Ext: no edema  : def  Rectal: def  Neuro: NF    Significant Labs: All pertinent labs within the past 24 hours have been reviewed.    Significant Imaging: I have reviewed all pertinent imaging results/findings within the past 24 hours.

## 2024-03-05 NOTE — ASSESSMENT & PLAN NOTE
TTE as noted  Responded well to diuresis but did bump Cr which has resolved today  As above re: OHS/TRISTEN etc

## 2024-03-05 NOTE — PT/OT/SLP PROGRESS
"Physical Therapy Treatment    Patient Name:  Milly Lee   MRN:  [id number]    Recommendations:     Discharge Recommendations: Low Intensity Therapy  Discharge Equipment Recommendations: none  Barriers to discharge:  desat on room air at rest and with exertion    Assessment:     Milly Lee is a 88 y.o. female admitted with a medical diagnosis of Acute hypoxemic respiratory failure.  She presents with the following impairments/functional limitations: weakness, impaired endurance, impaired cardiopulmonary response to activity, impaired functional mobility.    Pt available on 2nd attempt at therapy.    Pt found on RA, no distress, O2 sats 83% and stating "they told me to try going without". Pt was returned to 2L NC and improved to 94% with PLB while seated EOB.    Ambulated 150' x 2 with RW, CGA, 2L O2, slow pace, and comfortably conversant. Unable to obtain sats during gait.     Post-gait sats 88% while seated in chair, increasing to 92% with PLB. Nurse made aware of status.     Rehab Prognosis: Good; patient would benefit from acute skilled PT services to address these deficits and reach maximum level of function.    Recent Surgery: * No surgery found *      Plan:     During this hospitalization, patient to be seen 5 x/week to address the identified rehab impairments via gait training, therapeutic activities, therapeutic exercises and progress toward the following goals:    Plan of Care Expires:       Subjective     Chief Complaint: none  Patient/Family Comments/goals: walk in [place]  Pain/Comfort:  Pain Rating 1: 0/10  Pain Rating Post-Intervention 1: 0/10      Objective:     Communicated with nurse prior to session.  Patient found HOB elevated with telemetry, bed alarm, PureWick, peripheral IV upon PT entry to room.     General Precautions: Standard, fall  Orthopedic Precautions: N/A  Braces: N/A  Respiratory Status:  found on RA; pt left on 2L NC     Functional Mobility:  Bed Mobility:     Supine " to Sit: stand by assistance  Transfers:     Sit to Stand:  stand by assistance with rolling walker  Gait: 150' x 2, RW, CGA, slow pace and conversant. No complaints of SOB      AM-PAC 6 CLICK MOBILITY          Treatment & Education:  -Pt educ: benefits of participation with PT, OOB to chair during day as tolerated, call light, fall prevention  -Monitoring of O2 sats pre and post activity    Patient left up in chair with all lines intact, call button in reach, and nurse notified..    GOALS:   Multidisciplinary Problems       Physical Therapy Goals          Problem: Physical Therapy    Goal Priority Disciplines Outcome Goal Variances Interventions   Physical Therapy Goal     PT, PT/OT Ongoing, Progressing     Description: 1. Supine to sit with Stand-by Assistance  2. Sit to stand transfer with Stand-by Assistance  3.. Bed to chair transfer with Supervision using Rolling Walker  4. Gait  x 100 feet with Stand By Assistance using Rolling Walker.                          Time Tracking:     PT Received On: 03/05/24  PT Start Time: 1307     PT Stop Time: 1333  PT Total Time (min): 26 min     Billable Minutes: Gait Training 16 and Therapeutic Activity 10    Treatment Type: Treatment  PT/PTA: PTA     Number of PTA visits since last PT visit: 1 03/05/2024

## 2024-03-05 NOTE — ASSESSMENT & PLAN NOTE
Noted to have hypercapnea probably related to TRISTEN/OHS on admission per pulm  BiPAP w/ sleep   Pt reports being on NIPPV at home 5 yrs ago, quit using it, didn't like the sounds it made  She lost 70# since then w/ TLC  Still needs f/u for this - OPT PSG as soon as possible upon d/c

## 2024-03-05 NOTE — NURSING
Nurses Note -- 4 Eyes      3/5/2024   1:23 PM      Skin assessed during: Daily Assessment      [] No Altered Skin Integrity Present    []Prevention Measures Documented      [x] Yes- Altered Skin Integrity Present or Discovered   [] LDA Added if Not in Epic (Describe Wound)   [] New Altered Skin Integrity was Present on Admit and Documented in LDA   [x] Wound Image Taken    Wound Care Consulted? Yes    Attending Nurse:  ELIAN Galindo    Second RN/Staff Member:   SUSANNE Fierro

## 2024-03-05 NOTE — ASSESSMENT & PLAN NOTE
TTE 3/3/24    Left Ventricle: The left ventricle is normal in size. Mildly increased wall thickness. There is mild concentric hypertrophy. Normal wall motion. There is normal systolic function with a visually estimated ejection fraction of 60 - 65%. There is normal diastolic function.    Right Ventricle: Normal right ventricular cavity size. Wall thickness is normal. Right ventricle wall motion  is normal. Systolic function is normal.    Aortic Valve: Not well visualized due to poor acoustic window. 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 It is reported to be a 26 mm Copeland valve. S3 Motion is not well-visualized Aortic valve area by VTI is 0.82 cm². Aortic valve peak velocity is 3.74 m/s. Mean gradient is 31 mmHg. The dimensionless index is 0.26. There is no significant regurgitation.    Aorta: Aortic root is normal in size. Ascending aorta is mildly to moderately dilated measuring 4.50 cm.    IVC/SVC: Normal venous pressure at 3 mmHg.

## 2024-03-05 NOTE — PLAN OF CARE
Problem: Adult Inpatient Plan of Care  Goal: Plan of Care Review  Outcome: Ongoing, Progressing  Goal: Patient-Specific Goal (Individualized)  Outcome: Ongoing, Progressing  Goal: Absence of Hospital-Acquired Illness or Injury  Outcome: Ongoing, Progressing  Goal: Optimal Comfort and Wellbeing  Outcome: Ongoing, Progressing  Goal: Readiness for Transition of Care  Outcome: Ongoing, Progressing     Problem: Diabetes Comorbidity  Goal: Blood Glucose Level Within Targeted Range  Outcome: Ongoing, Progressing     Problem: Fluid Imbalance (Pneumonia)  Goal: Fluid Balance  Outcome: Ongoing, Progressing     Problem: Infection (Pneumonia)  Goal: Resolution of Infection Signs and Symptoms  Outcome: Ongoing, Progressing     Problem: Respiratory Compromise (Pneumonia)  Goal: Effective Oxygenation and Ventilation  Outcome: Ongoing, Progressing     Problem: Skin Injury Risk Increased  Goal: Skin Health and Integrity  Outcome: Ongoing, Progressing     Problem: Fall Injury Risk  Goal: Absence of Fall and Fall-Related Injury  Outcome: Ongoing, Progressing     Problem: Impaired Wound Healing  Goal: Optimal Wound Healing  Outcome: Ongoing, Progressing     Problem: Fluid and Electrolyte Imbalance (Acute Kidney Injury/Impairment)  Goal: Fluid and Electrolyte Balance  Outcome: Ongoing, Progressing     Problem: Oral Intake Inadequate (Acute Kidney Injury/Impairment)  Goal: Optimal Nutrition Intake  Outcome: Ongoing, Progressing     Problem: Renal Function Impairment (Acute Kidney Injury/Impairment)  Goal: Effective Renal Function  Outcome: Ongoing, Progressing

## 2024-03-05 NOTE — ASSESSMENT & PLAN NOTE
Home meds as appropriate  Improved control  Likely has underlying TRISTEN/OHS and needs further OPT f/u for this

## 2024-03-05 NOTE — PT/OT/SLP PROGRESS
Occupational Therapy   Treatment    Name: Milly Lee  MRN: [id number]  Admitting Diagnosis:  Acute hypoxemic respiratory failure       Recommendations:     Discharge Recommendations: Low Intensity Therapy  Discharge Equipment Recommendations:  none  Barriers to discharge:  None    Assessment:     Milly Lee is a 88 y.o. female with a medical diagnosis of Acute hypoxemic respiratory failure.   Performance deficits affecting function are weakness, impaired endurance, impaired self care skills, impaired functional mobility, gait instability, impaired balance, impaired cardiopulmonary response to activity.     Rehab Prognosis:  Fair; patient would benefit from acute skilled OT services to address these deficits and reach maximum level of function.       Plan:     Patient to be seen 3 x/week to address the above listed problems via self-care/home management, therapeutic activities, therapeutic exercises  Plan of Care Expires: 04/04/24  Plan of Care Reviewed with: patient    Subjective     Pain/Comfort:  Pain Rating 1: 0/10  Pain Rating Post-Intervention 1: 0/10    Objective:     Communicated with: nurse prior to session.  Patient found up in chair with oxygen upon OT entry to room.    General Precautions: Standard, fall    Orthopedic Precautions:N/A  Braces: N/A  Respiratory Status: Nasal cannula, flow 2 L/min    Treatment & Education:  Pt found up in chair having just finished walk test with RT; pt will require home 02 per RT; OT discussed energy conservation techniques to apply to ADLs/IADLs, ADL safety, use of DME such as shower chair/rollator with pt/daughter.  They verbalized understanding.    Patient left up in chair with all lines intact, call button in reach, and daughter present    GOALS:   Multidisciplinary Problems       Occupational Therapy Goals          Problem: Occupational Therapy    Goal Priority Disciplines Outcome Interventions   Occupational Therapy Goal     OT, PT/OT      Description: Goals to be met by: 4/4/24     Patient will increase functional independence with ADLs by performing:    UE Dressing with Supervision.  LE Dressing with Minimal Assistance and Assistive Devices as needed.  Grooming while seated with Supervision.  Toileting from toilet with Supervision for hygiene and clothing management.   Toilet transfer to toilet with Supervision.                         Time Tracking:     OT Date of Treatment: 03/05/24  OT Start Time: 1153  OT Stop Time: 1207  OT Total Time (min): 14 min    Billable Minutes:Therapeutic Activity 14               3/5/2024

## 2024-03-05 NOTE — ASSESSMENT & PLAN NOTE
In setting of acute HFpEF exacerbation possibly precipitated by underlying TRISTEN/OHS, uncontrolled HTN, ingestion of salty potato chips  Lung clear today, pt still drops sats lying in bed  Does have PAINTER walking to bathroom  Obtain d-dimer though suspicion low for PE, if + consider CTA w/ some IVF  RT to determine O2 requirements  Continue diuresis  CXRs reviewed

## 2024-03-05 NOTE — NURSING
Nurses Note -- 4 Eyes      3/5/2024   12:04 AM      Skin assessed during: Transfer      [x] No Altered Skin Integrity Present    [x]Prevention Measures Documented      [] Yes- Altered Skin Integrity Present or Discovered   [] LDA Added if Not in Epic (Describe Wound)   [] New Altered Skin Integrity was Present on Admit and Documented in LDA   [] Wound Image Taken    Wound Care Consulted? No    Attending Nurse:  Sonia Dominguez RN/Staff Member:  FRANCE Lin RN

## 2024-03-06 VITALS
SYSTOLIC BLOOD PRESSURE: 132 MMHG | HEIGHT: 60 IN | OXYGEN SATURATION: 94 % | TEMPERATURE: 98 F | DIASTOLIC BLOOD PRESSURE: 64 MMHG | WEIGHT: 181.88 LBS | HEART RATE: 77 BPM | RESPIRATION RATE: 18 BRPM | BODY MASS INDEX: 35.71 KG/M2

## 2024-03-06 PROBLEM — D64.9 ANEMIA: Status: ACTIVE | Noted: 2024-03-06

## 2024-03-06 LAB
ALBUMIN SERPL BCP-MCNC: 3.5 G/DL (ref 3.5–5.2)
ALP SERPL-CCNC: 62 U/L (ref 55–135)
ALT SERPL W/O P-5'-P-CCNC: 11 U/L (ref 10–44)
ANION GAP SERPL CALC-SCNC: 6 MMOL/L (ref 8–16)
AST SERPL-CCNC: 25 U/L (ref 10–40)
BASOPHILS # BLD AUTO: 0.07 K/UL (ref 0–0.2)
BASOPHILS NFR BLD: 1.1 % (ref 0–1.9)
BILIRUB SERPL-MCNC: 0.5 MG/DL (ref 0.1–1)
BUN SERPL-MCNC: 13 MG/DL (ref 8–23)
CALCIUM SERPL-MCNC: 9.4 MG/DL (ref 8.7–10.5)
CHLORIDE SERPL-SCNC: 102 MMOL/L (ref 95–110)
CO2 SERPL-SCNC: 31 MMOL/L (ref 23–29)
CREAT SERPL-MCNC: 1 MG/DL (ref 0.5–1.4)
DIFFERENTIAL METHOD BLD: ABNORMAL
EOSINOPHIL # BLD AUTO: 0.2 K/UL (ref 0–0.5)
EOSINOPHIL NFR BLD: 3.5 % (ref 0–8)
ERYTHROCYTE [DISTWIDTH] IN BLOOD BY AUTOMATED COUNT: 16.2 % (ref 11.5–14.5)
EST. GFR  (NO RACE VARIABLE): 54.5 ML/MIN/1.73 M^2
GLUCOSE SERPL-MCNC: 103 MG/DL (ref 70–110)
GLUCOSE SERPL-MCNC: 116 MG/DL (ref 70–110)
GLUCOSE SERPL-MCNC: 169 MG/DL (ref 70–110)
HCT VFR BLD AUTO: 36.1 % (ref 37–48.5)
HGB BLD-MCNC: 11.7 G/DL (ref 12–16)
IMM GRANULOCYTES # BLD AUTO: 0.02 K/UL (ref 0–0.04)
IMM GRANULOCYTES NFR BLD AUTO: 0.3 % (ref 0–0.5)
LYMPHOCYTES # BLD AUTO: 1.4 K/UL (ref 1–4.8)
LYMPHOCYTES NFR BLD: 22 % (ref 18–48)
MAGNESIUM SERPL-MCNC: 1.8 MG/DL (ref 1.6–2.6)
MCH RBC QN AUTO: 31 PG (ref 27–31)
MCHC RBC AUTO-ENTMCNC: 32.4 G/DL (ref 32–36)
MCV RBC AUTO: 96 FL (ref 82–98)
MONOCYTES # BLD AUTO: 0.7 K/UL (ref 0.3–1)
MONOCYTES NFR BLD: 10.7 % (ref 4–15)
NEUTROPHILS # BLD AUTO: 4.1 K/UL (ref 1.8–7.7)
NEUTROPHILS NFR BLD: 62.4 % (ref 38–73)
NRBC BLD-RTO: 0 /100 WBC
PLATELET # BLD AUTO: 161 K/UL (ref 150–450)
PMV BLD AUTO: 11.1 FL (ref 9.2–12.9)
POTASSIUM SERPL-SCNC: 4.1 MMOL/L (ref 3.5–5.1)
PROT SERPL-MCNC: 6.5 G/DL (ref 6–8.4)
RBC # BLD AUTO: 3.78 M/UL (ref 4–5.4)
SODIUM SERPL-SCNC: 139 MMOL/L (ref 136–145)
WBC # BLD AUTO: 6.55 K/UL (ref 3.9–12.7)

## 2024-03-06 PROCEDURE — 94660 CPAP INITIATION&MGMT: CPT

## 2024-03-06 PROCEDURE — 83735 ASSAY OF MAGNESIUM: CPT | Performed by: INTERNAL MEDICINE

## 2024-03-06 PROCEDURE — 85025 COMPLETE CBC W/AUTO DIFF WBC: CPT | Performed by: INTERNAL MEDICINE

## 2024-03-06 PROCEDURE — 94761 N-INVAS EAR/PLS OXIMETRY MLT: CPT

## 2024-03-06 PROCEDURE — 99900031 HC PATIENT EDUCATION (STAT)

## 2024-03-06 PROCEDURE — 36415 COLL VENOUS BLD VENIPUNCTURE: CPT | Performed by: INTERNAL MEDICINE

## 2024-03-06 PROCEDURE — 99900035 HC TECH TIME PER 15 MIN (STAT)

## 2024-03-06 PROCEDURE — 25000003 PHARM REV CODE 250: Performed by: HOSPITALIST

## 2024-03-06 PROCEDURE — 80053 COMPREHEN METABOLIC PANEL: CPT | Performed by: INTERNAL MEDICINE

## 2024-03-06 PROCEDURE — 25000003 PHARM REV CODE 250: Performed by: INTERNAL MEDICINE

## 2024-03-06 PROCEDURE — 97535 SELF CARE MNGMENT TRAINING: CPT

## 2024-03-06 PROCEDURE — 27000221 HC OXYGEN, UP TO 24 HOURS

## 2024-03-06 PROCEDURE — 97530 THERAPEUTIC ACTIVITIES: CPT | Mod: CQ

## 2024-03-06 RX ORDER — FUROSEMIDE 40 MG/1
40 TABLET ORAL DAILY
Status: DISCONTINUED | OUTPATIENT
Start: 2024-03-06 | End: 2024-03-06 | Stop reason: HOSPADM

## 2024-03-06 RX ADMIN — DULOXETINE HYDROCHLORIDE 30 MG: 30 CAPSULE, DELAYED RELEASE ORAL at 09:03

## 2024-03-06 RX ADMIN — MUPIROCIN 1 G: 20 OINTMENT TOPICAL at 09:03

## 2024-03-06 RX ADMIN — METOPROLOL SUCCINATE 100 MG: 50 TABLET, EXTENDED RELEASE ORAL at 09:03

## 2024-03-06 RX ADMIN — APIXABAN 2.5 MG: 2.5 TABLET, FILM COATED ORAL at 09:03

## 2024-03-06 RX ADMIN — DIGOXIN 125 MCG: 125 TABLET ORAL at 09:03

## 2024-03-06 RX ADMIN — Medication 400 MG: at 09:03

## 2024-03-06 RX ADMIN — INSULIN ASPART 2 UNITS: 100 INJECTION, SOLUTION INTRAVENOUS; SUBCUTANEOUS at 11:03

## 2024-03-06 RX ADMIN — FAMOTIDINE 20 MG: 20 TABLET ORAL at 09:03

## 2024-03-06 RX ADMIN — FUROSEMIDE 40 MG: 40 TABLET ORAL at 09:03

## 2024-03-06 RX ADMIN — ASPIRIN 81 MG: 81 TABLET, COATED ORAL at 09:03

## 2024-03-06 RX ADMIN — HUMAN INSULIN 40 UNITS: 100 INJECTION, SUSPENSION SUBCUTANEOUS at 03:03

## 2024-03-06 NOTE — CONSULTS
Bilateral buttock red no breaks in skin, patient incontinent of urine, cleaned and dried and applied thin layer of Triad, nurse replaced versette, little redness to scrotum, applied Triad, no breakdown or redness to heels, elevated

## 2024-03-06 NOTE — ASSESSMENT & PLAN NOTE
TTE 3/3/24    Left Ventricle: The left ventricle is normal in size. Mildly increased wall thickness. There is mild concentric hypertrophy. Normal wall motion. There is normal systolic function with a visually estimated ejection fraction of 60 - 65%. There is normal diastolic function.    Right Ventricle: Normal right ventricular cavity size. Wall thickness is normal. Right ventricle wall motion  is normal. Systolic function is normal.    Aortic Valve: Not well visualized due to poor acoustic window. 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 It is reported to be a 26 mm Copeland valve. S3 Motion is not well-visualized Aortic valve area by VTI is 0.82 cm². Aortic valve peak velocity is 3.74 m/s. Mean gradient is 31 mmHg. The dimensionless index is 0.26. There is no significant regurgitation.    Aorta: Aortic root is normal in size. Ascending aorta is mildly to moderately dilated measuring 4.50 cm.    IVC/SVC: Normal venous pressure at 3 mmHg.  No acute issues  F/u w/ PCP, cards

## 2024-03-06 NOTE — PT/OT/SLP PROGRESS
Occupational Therapy   Treatment    Name: Milly Lee  MRN: [id number]  Admitting Diagnosis:  Acute hypoxemic respiratory failure    Recommendations:     Discharge Recommendations: Low Intensity Therapy  Discharge Equipment Recommendations:  none  Barriers to discharge:      Assessment:     Milly Lee is a 88 y.o. female with a medical diagnosis of Acute hypoxemic respiratory failure.  Pt walked in room and bathroom using RW with SBA and no LOB. She presents with performance deficits affecting function are weakness, impaired endurance, impaired self care skills, impaired functional mobility, gait instability, impaired balance and impaired cardiopulmonary response to activity. Continue OT plan of care.    Rehab Prognosis:  Good; patient would benefit from acute skilled OT services to address these deficits and reach maximum level of function.       Plan:     Patient to be seen 3 x/week to address the above listed problems via self-care/home management, therapeutic activities, therapeutic exercises  Plan of Care Expires: 04/04/24  Plan of Care Reviewed with: patient    Subjective     Chief Complaint: None stated  Patient/Family Comments/goals: Patient agreed to participate in OT.   Pain/Comfort:  Pain Rating 1: 0/10    Objective:     Communicated with: nurse prior to session.  Patient found HOB elevated with telemetry, oxygen, bed alarm and PureWick upon OT entry to room.    General Precautions: Standard, fall    Orthopedic Precautions: N/A  Braces: N/A  Respiratory Status: Nasal cannula, flow 2 L/min     Occupational Performance:     Bed Mobility:    Patient completed Scooting/Bridging with stand by assistance  Patient completed Supine to Sit with stand by assistance  Patient completed Sit to Supine with stand by assistance     Functional Mobility/Transfers:  Patient completed Sit <> Stand Transfer with stand by assistance with rolling walker   Patient completed Toilet Transfer Step Transfer  technique with stand by assistance with rolling walker  Functional Mobility: bed > bathroom > sink > bed using a RW with SBA    Activities of Daily Living:  Grooming: Pt washed her hands standing at the sink with stand by assistance.  Lower Body Dressing: maximal assistance to don socks  Toileting: minimum assistance for clothing management      Treatment & Education:  Therapist provided facilitation and instruction of fall prevention strategies during tasks listed above.      Patient left HOB elevated with all lines intact, call button in reach and bed alarm on.    GOALS:   Multidisciplinary Problems       Occupational Therapy Goals          Problem: Occupational Therapy    Goal Priority Disciplines Outcome Interventions   Occupational Therapy Goal     OT, PT/OT     Description: Goals to be met by: 4/4/24     Patient will increase functional independence with ADLs by performing:    UE Dressing with Supervision.  LE Dressing with Minimal Assistance and Assistive Devices as needed.  Grooming while seated with Supervision.  Toileting from toilet with Supervision for hygiene and clothing management.   Toilet transfer to toilet with Supervision.                         Time Tracking:     OT Date of Treatment: 03/06/24  OT Start Time: 1029  OT Stop Time: 1052  OT Total Time (min): 23 min    Billable Minutes:Self Care/Home Management 23               3/6/2024   Yes

## 2024-03-06 NOTE — PT/OT/SLP PROGRESS
Physical Therapy Treatment    Patient Name:  Milly Lee   MRN:  [id number]    Recommendations:     Discharge Recommendations: Low Intensity Therapy  Discharge Equipment Recommendations: none  Barriers to discharge:  decreased activity tolerance, respiratory deficits    Assessment:     Milly Lee is a 88 y.o. female admitted with a medical diagnosis of Acute hypoxemic respiratory failure.  She presents with the following impairments/functional limitations: weakness, impaired endurance, impaired self care skills, impaired functional mobility, gait instability, impaired balance, impaired cardiopulmonary response to activity.    Pt agreeable to visit. Pt reports that she already ambulated but willing to transfer to chair to sit up for lunch. Pt required stand by assist for supine to sit transfer. Once sitting EOB, pt request assist with brushing her hair.     Pt required contact guard assist for sit to stand transfer with slight posterior lean due to pt bearing weight through heels and picking up forefoot. Pt able to correct with verbal cuing to keep toes on the ground. Pt performed step transfer to chair with RW and contact guard assist.    Pt left up in chair with all needs within reach.    Rehab Prognosis: Fair; patient would benefit from acute skilled PT services to address these deficits and reach maximum level of function.    Recent Surgery: * No surgery found *      Plan:     During this hospitalization, patient to be seen 5 x/week to address the identified rehab impairments via gait training, therapeutic activities, therapeutic exercises and progress toward the following goals:    Plan of Care Expires:       Subjective     Chief Complaint: pt wanting her hair brushed  Patient/Family Comments/goals: go home  Pain/Comfort:  Pain Rating 1: 0/10      Objective:     Communicated with RN prior to session.  Patient found HOB elevated with oxygen, peripheral IV, PureWick, bed alarm upon PT entry to  room.     General Precautions: Standard, fall  Orthopedic Precautions: N/A  Braces: N/A  Respiratory Status: Nasal cannula, flow 2 L/min     Functional Mobility:  Bed Mobility:     Supine to Sit: stand by assistance  Transfers:     Sit to Stand:  contact guard assistance with rolling walker  Bed to Chair: contact guard assistance with  rolling walker  using  Step Transfer      AM-PAC 6 CLICK MOBILITY          Treatment & Education:  Pt educated on importance of time OOB, importance of intermittent mobility, safe techniques for transfers/ambulation, discharge recommendations/options, and use of call light for assistance and fall prevention.      Patient left up in chair with all lines intact, call button in reach, and chair alarm on..    GOALS:   Multidisciplinary Problems       Physical Therapy Goals          Problem: Physical Therapy    Goal Priority Disciplines Outcome Goal Variances Interventions   Physical Therapy Goal     PT, PT/OT Ongoing, Progressing     Description: 1. Supine to sit with Stand-by Assistance  2. Sit to stand transfer with Stand-by Assistance  3.. Bed to chair transfer with Supervision using Rolling Walker  4. Gait  x 100 feet with Stand By Assistance using Rolling Walker.                          Time Tracking:     PT Received On: 03/06/24  PT Start Time: 1122     PT Stop Time: 1134  PT Total Time (min): 12 min     Billable Minutes: Therapeutic Activity 12    Treatment Type: Treatment  PT/PTA: PTA     Number of PTA visits since last PT visit: 2     03/06/2024

## 2024-03-06 NOTE — PLAN OF CARE
Referrals sent to [facility] for O2 in careport and rightfaxed to [facility] and referral for HH sent to [facility] via careport.         03/06/24 1315   Post-Acute Status   Post-Acute Authorization Home Health;HME   HME Status Referrals Sent   Home Health Status Referrals Sent   Discharge Plan   Discharge Plan A Home Health   Discharge Plan B Home with family

## 2024-03-06 NOTE — NURSING
Nurses Note -- 4 Eyes      3/6/2024   10:17 AM      Skin assessed during: Daily Assessment      [] No Altered Skin Integrity Present    []Prevention Measures Documented      [x] Yes- Altered Skin Integrity Present or Discovered   [] LDA Added if Not in Epic (Describe Wound)   [] New Altered Skin Integrity was Present on Admit and Documented in LDA   [x] Wound Image Taken    Wound Care Consulted? No, already consulted    Attending Nurse:  ELIAN Galindo    Second RN/Staff Member:

## 2024-03-06 NOTE — ASSESSMENT & PLAN NOTE
Cr improved from 1.6 to 1 and did not go up after CTA, we gave gentle hydration w/ NS to alleviate and this has not overloaded her clinically  Good u/o  Avoid nephrotoxins, no oral NSAIDs and rec caution w/ topical NSAIDs etc  Gentle diuresis  Trend as OPT

## 2024-03-06 NOTE — PLAN OF CARE
Problem: Adult Inpatient Plan of Care  Goal: Plan of Care Review  Outcome: Met  Goal: Patient-Specific Goal (Individualized)  Outcome: Met  Goal: Absence of Hospital-Acquired Illness or Injury  Outcome: Met  Goal: Optimal Comfort and Wellbeing  Outcome: Met  Goal: Readiness for Transition of Care  Outcome: Met     Problem: Diabetes Comorbidity  Goal: Blood Glucose Level Within Targeted Range  Outcome: Met     Problem: Fluid Imbalance (Pneumonia)  Goal: Fluid Balance  Outcome: Met     Problem: Infection (Pneumonia)  Goal: Resolution of Infection Signs and Symptoms  Outcome: Met     Problem: Respiratory Compromise (Pneumonia)  Goal: Effective Oxygenation and Ventilation  Outcome: Met     Problem: Skin Injury Risk Increased  Goal: Skin Health and Integrity  Outcome: Met     Problem: Fall Injury Risk  Goal: Absence of Fall and Fall-Related Injury  Outcome: Met     Problem: Impaired Wound Healing  Goal: Optimal Wound Healing  Outcome: Met     Problem: Fluid and Electrolyte Imbalance (Acute Kidney Injury/Impairment)  Goal: Fluid and Electrolyte Balance  Outcome: Met     Problem: Oral Intake Inadequate (Acute Kidney Injury/Impairment)  Goal: Optimal Nutrition Intake  Outcome: Met     Problem: Renal Function Impairment (Acute Kidney Injury/Impairment)  Goal: Effective Renal Function  Outcome: Met

## 2024-03-06 NOTE — NURSING
Discharge instructions reviewed with pt and daughter.  IV removed without difficulty, catheter intact.  Pt left unit in stable condition with portable oxygen and personal belongings to personal transportation.

## 2024-03-06 NOTE — PLAN OF CARE
Message left with pt PCP office requesting c/b for TCC appointment       03/06/24 9652   Post-Acute Status   Hospital Resources/Appts/Education Provided Appointment suggestion unavailable

## 2024-03-06 NOTE — PLAN OF CARE
Spoke with Salbador with [facility] and she reports O2 has been set up and she is delivering O2 to pt room.  Will arrange delivery of Home concentrator as well.       03/06/24 1447   Post-Acute Status   Post-Acute Authorization E   E Status Set-up Complete/Auth obtained

## 2024-03-06 NOTE — PLAN OF CARE
O2 has been delivered by [facility] and [facility] has accepted pt. TCC with PCP scheduled on pt previous appointment on 3/12/24 1615 (CM spoke with Chrystal with PCP clinic). All this discharge information was reviewed via phone with pt daughter and she is leaving to come pick pt up.  Met with pt and reviewed discharge plan. Pt reports that she has a walker at home from a friend and does not want another one ordered at this time.  Discharge orders and chart reviewed with no further post-acute discharge needs identified at this time.  At this time, patient is cleared for discharge from Case Management standpoint.        03/06/24 1530   Final Note   Assessment Type Final Discharge Note   Anticipated Discharge Disposition [facility]   Hospital Resources/Appts/Education Provided Appointments scheduled and added to AVS   Post-Acute Status   Post-Acute Authorization HME   HME Status Set-up Complete/Auth obtained   Home Health Status Set-up Complete/Auth obtained   Discharge Delays None known at this time

## 2024-03-06 NOTE — CARE UPDATE
03/06/24 0743   Patient Assessment/Suction   Level of Consciousness (AVPU) alert   Respiratory Effort Normal;Unlabored   Expansion/Accessory Muscles/Retractions no use of accessory muscles;expansion symmetric   Skin Integrity   $ Wound Care Tech Time 15 min   Area Observed Left;Right;Behind ear;Cheek   Skin Appearance without discoloration   PRE-TX-O2   Device (Oxygen Therapy) nasal cannula with humidification   $ Is the patient on Low Flow Oxygen? Yes   Flow (L/min) 2   SpO2 95 %   Pulse Oximetry Type Intermittent   $ Pulse Oximetry - Multiple Charge Pulse Oximetry - Multiple   Preset CPAP/BiPAP Settings   Mode Of Delivery BiPAP S/T;Standby   $ CPAP/BiPAP Daily Charge BiPAP/CPAP Daily   Education   $ Education BiPAP;15 min

## 2024-03-06 NOTE — DISCHARGE SUMMARY
[facility] Medicine  Discharge Summary      Patient Name: Milly Lee  MRN: [id number]  ADRIAN: [id number]  Patient Class: IP- Inpatient  Admission Date: 3/2/2024  Hospital Length of Stay: 4 days  Discharge Date and Time:  03/06/2024 1:01 PM  Attending Physician: Isaiah Zambrano MD   Discharging Provider: Isaiah Zambrano MD  Primary Care Provider: Michela Christian MD    Primary Care Team: Networked reference to record PCT     HPI:   88 year old pt getting admitted with a cute hypoxic resp failure and acute CHF flare  History is limited because pt is a very poor historian  Per pt she started having shortness of breath since yesterday  Later she noticed SOB on exertion and today AM she had SOB on rest  She came to hospital and got admitted  Denies fever/chest pain or other issues   Pt was admitted to ICU because of resp distress and in need of HF O2 in ER  After diuresis pt felt better  Case discussed with Dr Grant and decision was made to hold any antibiotics at this point      Pt had CABG in 2009  Pt had LHC in Feb 2018 with no PCI  Pt had Normal stress test on November 2023  Pt had TAVR in March 2018  Pt had EGD with dilatation of Esophagus in August 2023  She last saw her Cardiologist :Dr Benitez in [place] clinic on August 2023    * No surgery found *      Hospital Course:   This is a 88 year old female who was admitted with acute hypoxic respiratory failure due to acute on chronic HFpEF. Patient has been diuresed well with IV lasix with > 2 L urine output on the first day. On admission she was on high flow NC needing admission to ICU. Now she is weaned to 5 L, she is feeling better, hemodynamically stable. Echo was done, pending report. Patient can be transferred out of ICU, PT, OT consulted. On 3/4 Cr up trended and Lasix was held.   3/5/24 pt feeling well and eager to go, but she did desat to 80s while lying in bed off O2 - has not previously required O2 - will need RT to  test for home O2 and may require a bit more tuning up prior to d/c home.  She does admit to eating a lot of salty chips prior to onset of acute SOB, has noted PAINTER going to bathroom lately.    3/6/24 Pt does require O2 to maintain sats, has not had to use O2 in past, no chronic lung disease, has h/o HFpEF and this appears to be the problem, her AS s/p TAVR and TAA don't seem to be an issue, her d dimer was up so we got CTA chest and this was neg for PE, she did have the TAA and some pulm edema, but feeling great again today, we are setting up home O2, she will go home today, continue daily Lasix 40 mg PO, avoid salty foods, and f/u w/ her PCP +/- cards/pulm at their discretion as well as her TAA team (sees every few months and was told intervention at 6 cm, it's 5 now). No cp/back pain etc.  She needs to consider referral from PCP to be reassessed for TRISTEN/OHS as well, she very well may have that lurking in background and contributing to her susceptibility to CHF exacerbation.      11 pt ROS negative except as noted o/w    General:  A&O, NAD  HEENT: neck supple, PERRL/EOMI, EAC clear bilaterally, normal bilateral carotid upstroke w/o bruits, inf turbs clear bilaterally, OC/OP clear  Lungs: CTAB  CV: RRR w/o M/G/R  Abdomen: soft, NTND, no HSM, no bruits/masses/pulsations, obese  Ext: no edema  : def  Rectal: def  Neuro: NF       Goals of Care Treatment Preferences:  Code Status: DNR      Consults:   Consults (From admission, onward)          Status Ordering Provider     Inpatient consult to Registered Dietitian/Nutritionist  Once        Provider:  (Not yet assigned)    Completed TRACY TAMAYO     Inpatient consult to Pulmonology  Once        Provider:  Uriel Grant MD    Completed SUKHI FLAHERTY            Pulmonary  * Acute hypoxemic respiratory failure  In setting of acute HFpEF exacerbation possibly precipitated by underlying TRISTEN/OHS, uncontrolled HTN, ingestion of salty potato chips  Lungs are  patricio  Does have PAINTER walking to bathroom  CTA negative for PE  No ACS  She does require home O2 for now  Needs TRISTEN/OHS evaluation too  Continue diuresis w/ home Lasix 40 mg QD  CXRs reviewed      Cardiac/Vascular  Acute on chronic diastolic (congestive) heart failure  TTE as noted  Responded well to diuresis but did bump Cr which has resolved today  As above re: OHS/TRISTEN etc      Longstanding persistent atrial fibrillation  Home meds as appropriate  Rate is ok  Does not sound like she's currently in AF  She's on digoxin too, level was ok  Watch lytes    CAD (coronary artery disease)  Cont current medications    Ascending aortic aneurysm  Not a candidate for Sx as per records  Aggressive BP control   Aortic root noted on TTE and CTA with very minimal increase in size since 2020  F/u w/ PCP and TAA team        Cardiac pacemaker in situ  No acute issues        H/O prosthetic aortic valve replacement  TTE 3/3/24    Left Ventricle: The left ventricle is normal in size. Mildly increased wall thickness. There is mild concentric hypertrophy. Normal wall motion. There is normal systolic function with a visually estimated ejection fraction of 60 - 65%. There is normal diastolic function.    Right Ventricle: Normal right ventricular cavity size. Wall thickness is normal. Right ventricle wall motion  is normal. Systolic function is normal.    Aortic Valve: Not well visualized due to poor acoustic window. 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 It is reported to be a 26 mm Copeland valve. S3 Motion is not well-visualized Aortic valve area by VTI is 0.82 cm². Aortic valve peak velocity is 3.74 m/s. Mean gradient is 31 mmHg. The dimensionless index is 0.26. There is no significant regurgitation.    Aorta: Aortic root is normal in size. Ascending aorta is mildly to moderately dilated measuring 4.50 cm.    IVC/SVC: Normal venous pressure at 3 mmHg.  No acute issues  F/u w/ PCP, cards      Uncontrolled  hypertension  Home meds as appropriate  Improved control  Likely has underlying TRISTEN/OHS and needs further OPT f/u for this    Renal/  Lactic acid acidosis  Mild, per pulm ? Beta agonist related, these were stopped  She does have albuterol MDI for prn use        DAVID (acute kidney injury)  Improved after holding Lasix, resuming home dose  F/u w/ PCP  No NSAIDs  Normal u/a  Avoid nephrotoxins as we are able        CKD (chronic kidney disease), stage III  Cr improved from 1.6 to 1 and did not go up after CTA, we gave gentle hydration w/ NS to alleviate and this has not overloaded her clinically  Good u/o  Avoid nephrotoxins, no oral NSAIDs and rec caution w/ topical NSAIDs etc  Gentle diuresis  Trend as OPT      Oncology  Anemia  Mild and likely chronic disease related  PPI  OPT f/u    Endocrine  Hypothyroidism  Cont levothyroxine  TSH 1.3 in Nov      Type 2 diabetes mellitus with stage 3 chronic kidney disease, with long-term current use of insulin  A1c 6.2%  Resume home regimen  F/u w/ PCP      Other  Advance care planning  DNR      TRISTEN (obstructive sleep apnea)  Noted to have hypercapnea probably related to TRISTEN/OHS on admission per pulm  BiPAP w/ sleep   Pt reports being on NIPPV at home 5 yrs ago, quit using it, didn't like the sounds it made  She lost 70# since then w/ TLC  Still needs f/u for this - OPT PSG as soon as possible upon d/c          Final Active Diagnoses:    Diagnosis Date Noted POA    PRINCIPAL PROBLEM:  Acute hypoxemic respiratory failure [J96.01] 03/02/2024 Yes    Anemia [D64.9] 03/06/2024 No    TRISTEN (obstructive sleep apnea) [G47.33] 03/05/2024 Yes    Lactic acid acidosis [E87.20] 03/05/2024 Yes    Advance care planning [Z71.89] 03/05/2024 Not Applicable    DAVID (acute kidney injury) [N17.9] 03/04/2024 No    Acute on chronic diastolic (congestive) heart failure [I50.33] 03/02/2024 Yes    Longstanding persistent atrial fibrillation [I48.11] 10/19/2020 Yes    CKD (chronic kidney disease), stage III  [N18.30] 03/19/2018 Yes    Hypothyroidism [E03.9] 03/19/2018 Yes    CAD (coronary artery disease) [I25.10]  Yes    Ascending aortic aneurysm [I71.21] 02/22/2018 Yes    Cardiac pacemaker in situ [Z95.0] 02/21/2018 Yes    Type 2 diabetes mellitus with stage 3 chronic kidney disease, with long-term current use of insulin [E11.22, N18.30, Z79.4] 02/21/2018 Not Applicable    H/O prosthetic aortic valve replacement [Z95.2] 02/21/2018 Not Applicable    Uncontrolled hypertension [I10]  Yes      Problems Resolved During this Admission:    Diagnosis Date Noted Date Resolved POA    Chronic diastolic heart failure [I50.32] 03/19/2018 03/03/2024 Yes       Discharged Condition: stable    Disposition:     Follow Up:   Follow-up Information       Michela Christian MD Follow up in 3 day(s).    Specialty: Family Medicine  Contact information:  [address] [place] [address]  [address]  [place] [address]  [phone]                           Patient Instructions:      OXYGEN FOR HOME USE     Order Specific Question Answer Comments   Liter Flow 3    Duration Continuous    Qualifying Test Performed at: Activity    Oxygen saturation at rest 93    Oxygen saturation with activity 86    Oxygen saturation with activity on oxygen 92    Portable mode: continuous    Route nasal cannula    Device: home concentrator with portable concentrator    Length of need (in months): 12 mos    Patient condition with qualifying saturation CHF    Height: 5' (1.524 m)    Weight: 82.5 kg (181 lb 14.1 oz)    Alternative treatment measures have been tried or considered and deemed clinically ineffective. Yes        Significant Diagnostic Studies: Labs: All labs within the past 24 hours have been reviewed    Pending Diagnostic Studies:       None           Medications:  Reconciled Home Medications:      Medication List        CHANGE how you take these medications      metoprolol succinate 100 MG 24 hr tablet  Commonly known as: TOPROL-XL  Take 1 tablet (100 mg total) by  mouth once daily.  What changed: when to take this            CONTINUE taking these medications      AEROCHAMBER PLUS FLOW-VU  Generic drug: inhalation spacing device     apixaban 5 mg Tab  Commonly known as: ELIQUIS  Take 1 tablet (5 mg total) by mouth 2 (two) times daily.     ascorbic acid (vitamin C) 500 mg Cap  Take 500 mg by mouth once daily.     BAQSIMI 3 mg/actuation Spry  Generic drug: glucagon  1 Dose by Each Nostril route daily as needed.     calcium carbonate-vitamin D3 600 mg-5 mcg (200 unit) Cap  Take 1 capsule by mouth once daily.     CONTOUR TEST STRIPS Strp  Generic drug: blood sugar diagnostic  1 strip by Misc.(Non-Drug; Combo Route) route 2 (two) times a day.     cyanocobalamin 500 MCG tablet  Take 1,000 mcg by mouth once daily.     diclofenac sodium 1 % Gel  Commonly known as: VOLTAREN  Apply 4 g topically 2 (two) times daily.     digoxin 125 mcg tablet  Commonly known as: LANOXIN  Take 1 tablet (125 mcg total) by mouth once daily.     diltiaZEM 120 MG Cp24  Commonly known as: CARDIZEM CD  Take 1 capsule (120 mg total) by mouth once daily.     DULoxetine 30 MG capsule  Commonly known as: CYMBALTA  Take 30 mg by mouth once daily.     fluticasone propionate 50 mcg/actuation nasal spray  Commonly known as: FLONASE  1 spray by Each Nostril route once daily.     furosemide 40 MG tablet  Commonly known as: LASIX  Take 40 mg by mouth once daily.     glucose 4 GM chewable tablet  Take 16 g by mouth as needed for Low blood sugar.     hydrocortisone 2.5 % cream  Apply 1 application  topically as needed.     ibuprofen 200 MG tablet  Commonly known as: ADVIL,MOTRIN  Take 200 mg by mouth every 6 (six) hours as needed for Pain.     levothyroxine 175 MCG tablet  Commonly known as: SYNTHROID, LEVOTHROID  Take 175 mcg by mouth once daily.     magnesium oxide 250 mg magnesium Tab  Commonly known as: MAG-OX  Take 400 mg by mouth once daily.     nitroGLYCERIN 0.4 MG SL tablet  Commonly known as: NITROSTAT  Place 0.4  "mg under the tongue every 5 (five) minutes as needed for Chest pain.     NovoLOG Mix 70-30FlexPen U-100 100 unit/mL (70-30) Inpn pen  Generic drug: insulin aspart protamine-insulin aspart  Inject into the skin 2 (two) times daily. 35 units if glucose <150  40 units if glucose >150     nystatin cream  Commonly known as: MYCOSTATIN  Apply 100,000 Units topically 2 (two) times daily.     omega-3 fatty acids-fish oil 340-1,000 mg Cap  Take 1 capsule by mouth once daily.     ondansetron 4 MG Tbdl  Commonly known as: ZOFRAN-ODT  Take 4 mg by mouth every 6 (six) hours as needed.     pantoprazole 20 MG tablet  Commonly known as: PROTONIX  Take 20 mg by mouth once daily.     potassium gluconate 2.5 mEq Tab  Take 3 tablets by mouth 2 (two) times a day.     pyridoxine (vitamin B6) 100 MG Tab  Commonly known as: B-6  Take 1 tablet by mouth once daily.     rosuvastatin 20 MG tablet  Commonly known as: CRESTOR  Take 20 mg by mouth once daily.     tobramycin-dexAMETHasone 0.3-0.1% 0.3-0.1 % Drps  Commonly known as: TOBRADEX  Place 1 drop into both eyes every 6 (six) hours.     triamcinolone acetonide 0.1% 0.1 % cream  Commonly known as: KENALOG  Apply 1 g topically 2 (two) times daily.     TRUE METRIX GLUCOSE METER Misc  Generic drug: blood-glucose meter     * TRUEPLUS LANCETS 28 gauge Misc  Generic drug: lancets  1 lancet by Misc.(Non-Drug; Combo Route) route 2 (two) times a day.     * TRUEPLUS LANCETS 33 gauge Misc  Generic drug: lancets  1 lancet by Misc.(Non-Drug; Combo Route) route 2 (two) times a day.     TRUEPLUS PEN NEEDLE 31 gauge x 1/4" Ndle  Generic drug: pen needle, diabetic  1 pen by Misc.(Non-Drug; Combo Route) route 2 (two) times a day.     VENTOLIN HFA 90 mcg/actuation inhaler  Generic drug: albuterol  Inhale 1 puff into the lungs every 4 (four) hours as needed for Wheezing.           * This list has 2 medication(s) that are the same as other medications prescribed for you. Read the directions carefully, and ask " your doctor or other care provider to review them with you.                STOP taking these medications      AMITIZA 8 MCG Cap  Generic drug: lubiprostone     ARIPiprazole 10 MG Tab  Commonly known as: ABILIFY     aspirin 81 MG EC tablet  Commonly known as: ECOTRIN     budesonide 1 mg/2 mL Nbsp     EScitalopram oxalate 20 MG tablet  Commonly known as: LEXAPRO     hydrOXYzine HCL 25 MG tablet  Commonly known as: ATARAX     insulin NPH-insulin regular (70/30) 100 unit/mL (70-30) injection     ipratropium-albuteroL  mcg/actuation inhaler  Commonly known as: CombiVENT              Indwelling Lines/Drains at time of discharge:   Lines/Drains/Airways       None                   Time spent on the discharge of patient: > 30 minutes     Careplan d/w pt and her daughter Corinne - [facility] MD Isaiah Zambrano MD  Department of Hospital Medicine  [facility]

## 2024-03-06 NOTE — ASSESSMENT & PLAN NOTE
Improved after holding Lasix, resuming home dose  F/u w/ PCP  No NSAIDs  Normal u/a  Avoid nephrotoxins as we are able

## 2024-03-06 NOTE — ASSESSMENT & PLAN NOTE
Not a candidate for Sx as per records  Aggressive BP control   Aortic root noted on TTE and CTA with very minimal increase in size since 2020  F/u w/ PCP and TAA team

## 2024-03-06 NOTE — ASSESSMENT & PLAN NOTE
In setting of acute HFpEF exacerbation possibly precipitated by underlying TRISTEN/OHS, uncontrolled HTN, ingestion of salty potato chips  Lungs are clear  Does have PAINTER walking to bathroom  CTA negative for PE  No ACS  She does require home O2 for now  Needs TRISTEN/OHS evaluation too  Continue diuresis w/ home Lasix 40 mg QD  CXRs reviewed

## 2024-03-06 NOTE — PLAN OF CARE
[facility] accepted with SOC 2/7/24 03/06/24 [id number]   Post-Acute Status   Post-Acute Authorization Home Health   Home Health Status Set-up Complete/Auth obtained

## 2024-03-06 NOTE — ASSESSMENT & PLAN NOTE
Home meds as appropriate  Rate is ok  Does not sound like she's currently in AF  She's on digoxin too, level was ok  Watch jacky

## 2024-03-06 NOTE — ASSESSMENT & PLAN NOTE
Mild, per pulm ? Beta agonist related, these were stopped  She does have albuterol MDI for prn use

## 2024-03-07 LAB
BACTERIA BLD CULT: NORMAL
BACTERIA BLD CULT: NORMAL

## 2024-03-14 ENCOUNTER — PATIENT OUTREACH (OUTPATIENT)
Dept: ADMINISTRATIVE | Facility: CLINIC | Age: 89
End: 2024-03-14
Payer: MEDICARE

## 2024-03-14 NOTE — PROGRESS NOTES
2nd attempt to make TCC Call. Left voicemail. Please call [phone] leave your first and last name and date of birth for Hubert. I will return your call.

## 2024-03-14 NOTE — PROGRESS NOTES
C3 nurse attempted to contact [facility] [place] for a TCC post hospital discharge follow up call. No answer. Left voicemail with callback information. The patient had a scheduled HOSFU appointment with Michela Christian MD on 03/12/24

## 2024-03-15 NOTE — PROGRESS NOTES
3rd attempt for TCC Call; Left voicemail. Please call [phone] please leave first and last name and  for Hubert. We will return your call.

## 2024-03-25 LAB
OHS QRS DURATION: 94 MS
OHS QTC CALCULATION: 439 MS

## 2024-06-03 PROBLEM — N17.9 AKI (ACUTE KIDNEY INJURY): Status: RESOLVED | Noted: 2024-03-04 | Resolved: 2024-06-03

## 2024-06-03 PROBLEM — J96.01 ACUTE HYPOXEMIC RESPIRATORY FAILURE: Status: RESOLVED | Noted: 2024-03-02 | Resolved: 2024-06-03

## 2024-06-09 ENCOUNTER — HOSPITAL ENCOUNTER (INPATIENT)
Facility: HOSPITAL | Age: 89
LOS: 3 days | Discharge: NURSING, REHAB OR LONG-TERM CARE | DRG: 091 | End: 2024-06-13
Attending: EMERGENCY MEDICINE | Admitting: EMERGENCY MEDICINE
Payer: MEDICARE

## 2024-06-09 DIAGNOSIS — M25.531 RIGHT WRIST PAIN: ICD-10-CM

## 2024-06-09 DIAGNOSIS — Z79.899 POLYPHARMACY: ICD-10-CM

## 2024-06-09 DIAGNOSIS — J96.21 ACUTE ON CHRONIC RESPIRATORY FAILURE WITH HYPOXIA: ICD-10-CM

## 2024-06-09 DIAGNOSIS — S42.291D CLOSED FRACTURE OF HEAD OF RIGHT HUMERUS WITH ROUTINE HEALING, SUBSEQUENT ENCOUNTER: ICD-10-CM

## 2024-06-09 DIAGNOSIS — R07.89 CHEST HEAVINESS: ICD-10-CM

## 2024-06-09 DIAGNOSIS — R41.82 AMS (ALTERED MENTAL STATUS): ICD-10-CM

## 2024-06-09 DIAGNOSIS — R41.82 ALTERED MENTAL STATUS, UNSPECIFIED ALTERED MENTAL STATUS TYPE: Primary | ICD-10-CM

## 2024-06-09 LAB
ALBUMIN SERPL BCP-MCNC: 3.6 G/DL (ref 3.5–5.2)
ALLENS TEST: ABNORMAL
ALP SERPL-CCNC: 57 U/L (ref 55–135)
ALT SERPL W/O P-5'-P-CCNC: 12 U/L (ref 10–44)
ANION GAP SERPL CALC-SCNC: 8 MMOL/L (ref 8–16)
AST SERPL-CCNC: 17 U/L (ref 10–40)
BASOPHILS # BLD AUTO: 0.07 K/UL (ref 0–0.2)
BASOPHILS NFR BLD: 0.6 % (ref 0–1.9)
BILIRUB SERPL-MCNC: 1.1 MG/DL (ref 0.1–1)
BNP SERPL-MCNC: 289 PG/ML (ref 0–99)
BUN SERPL-MCNC: 18 MG/DL (ref 8–23)
CALCIUM SERPL-MCNC: 8.7 MG/DL (ref 8.7–10.5)
CHLORIDE SERPL-SCNC: 97 MMOL/L (ref 95–110)
CO2 SERPL-SCNC: 27 MMOL/L (ref 23–29)
CREAT SERPL-MCNC: 1.2 MG/DL (ref 0.5–1.4)
DELSYS: ABNORMAL
DIFFERENTIAL METHOD BLD: ABNORMAL
EOSINOPHIL # BLD AUTO: 0.1 K/UL (ref 0–0.5)
EOSINOPHIL NFR BLD: 0.7 % (ref 0–8)
ERYTHROCYTE [DISTWIDTH] IN BLOOD BY AUTOMATED COUNT: 15.6 % (ref 11.5–14.5)
EST. GFR  (NO RACE VARIABLE): 43.5 ML/MIN/1.73 M^2
FLOW: 4
GLUCOSE SERPL-MCNC: 196 MG/DL (ref 70–110)
HCO3 UR-SCNC: 24.6 MMOL/L (ref 24–28)
HCT VFR BLD AUTO: 32.3 % (ref 37–48.5)
HGB BLD-MCNC: 10.8 G/DL (ref 12–16)
IMM GRANULOCYTES # BLD AUTO: 0.06 K/UL (ref 0–0.04)
IMM GRANULOCYTES NFR BLD AUTO: 0.5 % (ref 0–0.5)
INR PPP: 1.2 (ref 0.8–1.2)
LYMPHOCYTES # BLD AUTO: 1.3 K/UL (ref 1–4.8)
LYMPHOCYTES NFR BLD: 10.8 % (ref 18–48)
MAGNESIUM SERPL-MCNC: 1.7 MG/DL (ref 1.6–2.6)
MCH RBC QN AUTO: 30.3 PG (ref 27–31)
MCHC RBC AUTO-ENTMCNC: 33.4 G/DL (ref 32–36)
MCV RBC AUTO: 91 FL (ref 82–98)
MODE: ABNORMAL
MONOCYTES # BLD AUTO: 1.4 K/UL (ref 0.3–1)
MONOCYTES NFR BLD: 11.4 % (ref 4–15)
NEUTROPHILS # BLD AUTO: 9.1 K/UL (ref 1.8–7.7)
NEUTROPHILS NFR BLD: 76 % (ref 38–73)
NRBC BLD-RTO: 0 /100 WBC
PCO2 BLDA: 39.1 MMHG (ref 35–45)
PH SMN: 7.41 [PH] (ref 7.35–7.45)
PLATELET # BLD AUTO: 150 K/UL (ref 150–450)
PMV BLD AUTO: 10.9 FL (ref 9.2–12.9)
PO2 BLDA: 68 MMHG (ref 80–100)
POC BE: 0 MMOL/L
POC SATURATED O2: 93 % (ref 95–100)
POC TCO2: 26 MMOL/L (ref 23–27)
POTASSIUM SERPL-SCNC: 4.5 MMOL/L (ref 3.5–5.1)
PROT SERPL-MCNC: 6.8 G/DL (ref 6–8.4)
PROTHROMBIN TIME: 13.2 SEC (ref 9–12.5)
RBC # BLD AUTO: 3.57 M/UL (ref 4–5.4)
SAMPLE: ABNORMAL
SITE: ABNORMAL
SODIUM SERPL-SCNC: 132 MMOL/L (ref 136–145)
TROPONIN I SERPL HS-MCNC: 24 PG/ML (ref 0–14.9)
WBC # BLD AUTO: 12.03 K/UL (ref 3.9–12.7)

## 2024-06-09 PROCEDURE — 99900035 HC TECH TIME PER 15 MIN (STAT)

## 2024-06-09 PROCEDURE — 93005 ELECTROCARDIOGRAM TRACING: CPT | Performed by: GENERAL PRACTICE

## 2024-06-09 PROCEDURE — 84484 ASSAY OF TROPONIN QUANT: CPT | Performed by: EMERGENCY MEDICINE

## 2024-06-09 PROCEDURE — 85025 COMPLETE CBC W/AUTO DIFF WBC: CPT | Performed by: EMERGENCY MEDICINE

## 2024-06-09 PROCEDURE — 93010 ELECTROCARDIOGRAM REPORT: CPT | Mod: ,,, | Performed by: GENERAL PRACTICE

## 2024-06-09 PROCEDURE — 80053 COMPREHEN METABOLIC PANEL: CPT | Performed by: EMERGENCY MEDICINE

## 2024-06-09 PROCEDURE — 83735 ASSAY OF MAGNESIUM: CPT | Performed by: EMERGENCY MEDICINE

## 2024-06-09 PROCEDURE — 82803 BLOOD GASES ANY COMBINATION: CPT

## 2024-06-09 PROCEDURE — 83880 ASSAY OF NATRIURETIC PEPTIDE: CPT | Performed by: EMERGENCY MEDICINE

## 2024-06-09 PROCEDURE — 99285 EMERGENCY DEPT VISIT HI MDM: CPT | Mod: 25

## 2024-06-09 PROCEDURE — 27000221 HC OXYGEN, UP TO 24 HOURS

## 2024-06-09 PROCEDURE — 36600 WITHDRAWAL OF ARTERIAL BLOOD: CPT

## 2024-06-09 PROCEDURE — 85610 PROTHROMBIN TIME: CPT | Performed by: EMERGENCY MEDICINE

## 2024-06-09 PROCEDURE — 94761 N-INVAS EAR/PLS OXIMETRY MLT: CPT | Mod: XB

## 2024-06-09 PROCEDURE — 99900031 HC PATIENT EDUCATION (STAT)

## 2024-06-09 NOTE — Clinical Note
Diagnosis: AMS (altered mental status) [8473418]   Future Attending Provider: YESENIA DREW [[id number]]   Admit to which facility:: [facility] [[id number]]   Reason for IP Medical Treatment  (Clinical interventions that can only be accomplished in the IP setting? ) :: AMS, acute on chronic respiratory failure   I certify that Inpatient services for greater than or equal to 2 midnights are medically necessary:: No   Plans for Post-Acute care--if anticipated (pick the single best option):: C. Discharge home with home health services   Special Needs:: No Special Needs [1]

## 2024-06-10 PROBLEM — R41.82 ALTERED MENTAL STATUS: Status: ACTIVE | Noted: 2024-06-10

## 2024-06-10 LAB
AMPHET+METHAMPHET UR QL: NEGATIVE
ANION GAP SERPL CALC-SCNC: 9 MMOL/L (ref 8–16)
BARBITURATES UR QL SCN>200 NG/ML: NEGATIVE
BASOPHILS # BLD AUTO: 0.08 K/UL (ref 0–0.2)
BASOPHILS NFR BLD: 0.8 % (ref 0–1.9)
BENZODIAZ UR QL SCN>200 NG/ML: NEGATIVE
BILIRUB UR QL STRIP: NEGATIVE
BUN SERPL-MCNC: 18 MG/DL (ref 8–23)
BZE UR QL SCN: NEGATIVE
CALCIUM SERPL-MCNC: 9.3 MG/DL (ref 8.7–10.5)
CANNABINOIDS UR QL SCN: NEGATIVE
CHLORIDE SERPL-SCNC: 97 MMOL/L (ref 95–110)
CLARITY UR: CLEAR
CO2 SERPL-SCNC: 27 MMOL/L (ref 23–29)
COLOR UR: YELLOW
CREAT SERPL-MCNC: 1.2 MG/DL (ref 0.5–1.4)
CREAT UR-MCNC: 149.2 MG/DL (ref 15–325)
DIFFERENTIAL METHOD BLD: ABNORMAL
EOSINOPHIL # BLD AUTO: 0.2 K/UL (ref 0–0.5)
EOSINOPHIL NFR BLD: 2.1 % (ref 0–8)
ERYTHROCYTE [DISTWIDTH] IN BLOOD BY AUTOMATED COUNT: 15.3 % (ref 11.5–14.5)
EST. GFR  (NO RACE VARIABLE): 44 ML/MIN/1.73 M^2
GLUCOSE SERPL-MCNC: 176 MG/DL (ref 70–110)
GLUCOSE UR QL STRIP: NEGATIVE
HCT VFR BLD AUTO: 32.8 % (ref 37–48.5)
HGB BLD-MCNC: 10.9 G/DL (ref 12–16)
HGB UR QL STRIP: NEGATIVE
IMM GRANULOCYTES # BLD AUTO: 0.03 K/UL (ref 0–0.04)
IMM GRANULOCYTES NFR BLD AUTO: 0.3 % (ref 0–0.5)
KETONES UR QL STRIP: NEGATIVE
LEUKOCYTE ESTERASE UR QL STRIP: NEGATIVE
LYMPHOCYTES # BLD AUTO: 1.4 K/UL (ref 1–4.8)
LYMPHOCYTES NFR BLD: 14.2 % (ref 18–48)
MAGNESIUM SERPL-MCNC: 1.7 MG/DL (ref 1.6–2.6)
MCH RBC QN AUTO: 30.7 PG (ref 27–31)
MCHC RBC AUTO-ENTMCNC: 33.2 G/DL (ref 32–36)
MCV RBC AUTO: 92 FL (ref 82–98)
MONOCYTES # BLD AUTO: 1.1 K/UL (ref 0.3–1)
MONOCYTES NFR BLD: 11.1 % (ref 4–15)
NEUTROPHILS # BLD AUTO: 7 K/UL (ref 1.8–7.7)
NEUTROPHILS NFR BLD: 71.5 % (ref 38–73)
NITRITE UR QL STRIP: NEGATIVE
NRBC BLD-RTO: 0 /100 WBC
OHS QRS DURATION: 170 MS
OHS QTC CALCULATION: 477 MS
OPIATES UR QL SCN: NEGATIVE
PCP UR QL SCN>25 NG/ML: NEGATIVE
PH UR STRIP: 6 [PH] (ref 5–8)
PHOSPHATE SERPL-MCNC: 3.2 MG/DL (ref 2.7–4.5)
PLATELET # BLD AUTO: 149 K/UL (ref 150–450)
PMV BLD AUTO: 11 FL (ref 9.2–12.9)
POCT GLUCOSE: 169 MG/DL (ref 70–110)
POCT GLUCOSE: 180 MG/DL (ref 70–110)
POCT GLUCOSE: 192 MG/DL (ref 70–110)
POCT GLUCOSE: 203 MG/DL (ref 70–110)
POTASSIUM SERPL-SCNC: 4.1 MMOL/L (ref 3.5–5.1)
PROCALCITONIN SERPL IA-MCNC: 0.2 NG/ML (ref 0–0.5)
PROT UR QL STRIP: NEGATIVE
RBC # BLD AUTO: 3.55 M/UL (ref 4–5.4)
SODIUM SERPL-SCNC: 133 MMOL/L (ref 136–145)
SP GR UR STRIP: 1.02 (ref 1–1.03)
TOXICOLOGY INFORMATION: NORMAL
TROPONIN I SERPL DL<=0.01 NG/ML-MCNC: 0.02 NG/ML (ref 0–0.03)
URN SPEC COLLECT METH UR: NORMAL
UROBILINOGEN UR STRIP-ACNC: NEGATIVE EU/DL
WBC # BLD AUTO: 9.75 K/UL (ref 3.9–12.7)

## 2024-06-10 PROCEDURE — 63600175 PHARM REV CODE 636 W HCPCS

## 2024-06-10 PROCEDURE — 84484 ASSAY OF TROPONIN QUANT: CPT | Performed by: STUDENT IN AN ORGANIZED HEALTH CARE EDUCATION/TRAINING PROGRAM

## 2024-06-10 PROCEDURE — 97535 SELF CARE MNGMENT TRAINING: CPT

## 2024-06-10 PROCEDURE — 84145 PROCALCITONIN (PCT): CPT | Performed by: STUDENT IN AN ORGANIZED HEALTH CARE EDUCATION/TRAINING PROGRAM

## 2024-06-10 PROCEDURE — 94664 DEMO&/EVAL PT USE INHALER: CPT

## 2024-06-10 PROCEDURE — 83735 ASSAY OF MAGNESIUM: CPT

## 2024-06-10 PROCEDURE — 94761 N-INVAS EAR/PLS OXIMETRY MLT: CPT

## 2024-06-10 PROCEDURE — 11000001 HC ACUTE MED/SURG PRIVATE ROOM

## 2024-06-10 PROCEDURE — 36415 COLL VENOUS BLD VENIPUNCTURE: CPT

## 2024-06-10 PROCEDURE — 85025 COMPLETE CBC W/AUTO DIFF WBC: CPT

## 2024-06-10 PROCEDURE — 93010 ELECTROCARDIOGRAM REPORT: CPT | Mod: ,,, | Performed by: GENERAL PRACTICE

## 2024-06-10 PROCEDURE — 27000221 HC OXYGEN, UP TO 24 HOURS

## 2024-06-10 PROCEDURE — 81003 URINALYSIS AUTO W/O SCOPE: CPT | Mod: 59 | Performed by: EMERGENCY MEDICINE

## 2024-06-10 PROCEDURE — 84100 ASSAY OF PHOSPHORUS: CPT

## 2024-06-10 PROCEDURE — 97161 PT EVAL LOW COMPLEX 20 MIN: CPT

## 2024-06-10 PROCEDURE — 25000003 PHARM REV CODE 250: Performed by: STUDENT IN AN ORGANIZED HEALTH CARE EDUCATION/TRAINING PROGRAM

## 2024-06-10 PROCEDURE — 97166 OT EVAL MOD COMPLEX 45 MIN: CPT

## 2024-06-10 PROCEDURE — 25000242 PHARM REV CODE 250 ALT 637 W/ HCPCS

## 2024-06-10 PROCEDURE — 36415 COLL VENOUS BLD VENIPUNCTURE: CPT | Performed by: STUDENT IN AN ORGANIZED HEALTH CARE EDUCATION/TRAINING PROGRAM

## 2024-06-10 PROCEDURE — 25000003 PHARM REV CODE 250

## 2024-06-10 PROCEDURE — 80307 DRUG TEST PRSMV CHEM ANLYZR: CPT | Performed by: EMERGENCY MEDICINE

## 2024-06-10 PROCEDURE — 97116 GAIT TRAINING THERAPY: CPT

## 2024-06-10 PROCEDURE — 80048 BASIC METABOLIC PNL TOTAL CA: CPT

## 2024-06-10 PROCEDURE — 93005 ELECTROCARDIOGRAM TRACING: CPT

## 2024-06-10 PROCEDURE — 99900035 HC TECH TIME PER 15 MIN (STAT)

## 2024-06-10 PROCEDURE — 94640 AIRWAY INHALATION TREATMENT: CPT

## 2024-06-10 RX ORDER — DULOXETIN HYDROCHLORIDE 30 MG/1
30 CAPSULE, DELAYED RELEASE ORAL DAILY
Status: DISCONTINUED | OUTPATIENT
Start: 2024-06-10 | End: 2024-06-13 | Stop reason: HOSPADM

## 2024-06-10 RX ORDER — LANOLIN ALCOHOL/MO/W.PET/CERES
800 CREAM (GRAM) TOPICAL
Status: DISCONTINUED | OUTPATIENT
Start: 2024-06-10 | End: 2024-06-13 | Stop reason: HOSPADM

## 2024-06-10 RX ORDER — DIGOXIN 125 MCG
125 TABLET ORAL DAILY
Status: DISCONTINUED | OUTPATIENT
Start: 2024-06-10 | End: 2024-06-13 | Stop reason: HOSPADM

## 2024-06-10 RX ORDER — CYCLOBENZAPRINE HCL 10 MG
10 TABLET ORAL 3 TIMES DAILY PRN
Status: ON HOLD | COMMUNITY
End: 2024-06-13 | Stop reason: HOSPADM

## 2024-06-10 RX ORDER — TRAMADOL HYDROCHLORIDE 50 MG/1
50 TABLET ORAL EVERY 8 HOURS PRN
Status: ON HOLD | COMMUNITY
End: 2024-06-13 | Stop reason: HOSPADM

## 2024-06-10 RX ORDER — ALUMINUM HYDROXIDE, MAGNESIUM HYDROXIDE, AND SIMETHICONE 1200; 120; 1200 MG/30ML; MG/30ML; MG/30ML
30 SUSPENSION ORAL 4 TIMES DAILY PRN
Status: DISCONTINUED | OUTPATIENT
Start: 2024-06-10 | End: 2024-06-13 | Stop reason: HOSPADM

## 2024-06-10 RX ORDER — LANOLIN ALCOHOL/MO/W.PET/CERES
1000 CREAM (GRAM) TOPICAL DAILY
Status: DISCONTINUED | OUTPATIENT
Start: 2024-06-10 | End: 2024-06-13 | Stop reason: HOSPADM

## 2024-06-10 RX ORDER — IPRATROPIUM BROMIDE AND ALBUTEROL SULFATE 2.5; .5 MG/3ML; MG/3ML
3 SOLUTION RESPIRATORY (INHALATION)
Status: DISCONTINUED | OUTPATIENT
Start: 2024-06-10 | End: 2024-06-13 | Stop reason: HOSPADM

## 2024-06-10 RX ORDER — INSULIN ASPART 100 [IU]/ML
0-5 INJECTION, SOLUTION INTRAVENOUS; SUBCUTANEOUS
Status: DISCONTINUED | OUTPATIENT
Start: 2024-06-10 | End: 2024-06-13 | Stop reason: HOSPADM

## 2024-06-10 RX ORDER — SODIUM CHLORIDE 9 MG/ML
INJECTION, SOLUTION INTRAVENOUS CONTINUOUS
Status: ACTIVE | OUTPATIENT
Start: 2024-06-10 | End: 2024-06-11

## 2024-06-10 RX ORDER — PANTOPRAZOLE SODIUM 20 MG/1
20 TABLET, DELAYED RELEASE ORAL DAILY
Status: DISCONTINUED | OUTPATIENT
Start: 2024-06-10 | End: 2024-06-13 | Stop reason: HOSPADM

## 2024-06-10 RX ORDER — FUROSEMIDE 40 MG/1
40 TABLET ORAL DAILY
Status: DISCONTINUED | OUTPATIENT
Start: 2024-06-10 | End: 2024-06-13 | Stop reason: HOSPADM

## 2024-06-10 RX ORDER — PROCHLORPERAZINE EDISYLATE 5 MG/ML
5 INJECTION INTRAMUSCULAR; INTRAVENOUS EVERY 6 HOURS PRN
Status: DISCONTINUED | OUTPATIENT
Start: 2024-06-10 | End: 2024-06-13 | Stop reason: HOSPADM

## 2024-06-10 RX ORDER — IBUPROFEN 200 MG
24 TABLET ORAL
Status: DISCONTINUED | OUTPATIENT
Start: 2024-06-10 | End: 2024-06-13 | Stop reason: HOSPADM

## 2024-06-10 RX ORDER — IBUPROFEN 200 MG
16 TABLET ORAL
Status: DISCONTINUED | OUTPATIENT
Start: 2024-06-10 | End: 2024-06-13 | Stop reason: HOSPADM

## 2024-06-10 RX ORDER — AMOXICILLIN 250 MG
1 CAPSULE ORAL 2 TIMES DAILY PRN
Status: DISCONTINUED | OUTPATIENT
Start: 2024-06-10 | End: 2024-06-13 | Stop reason: HOSPADM

## 2024-06-10 RX ORDER — ACETAMINOPHEN 325 MG/1
650 TABLET ORAL EVERY 4 HOURS PRN
Status: DISCONTINUED | OUTPATIENT
Start: 2024-06-10 | End: 2024-06-13 | Stop reason: HOSPADM

## 2024-06-10 RX ORDER — DILTIAZEM HYDROCHLORIDE 120 MG/1
120 CAPSULE, COATED, EXTENDED RELEASE ORAL DAILY
Status: DISCONTINUED | OUTPATIENT
Start: 2024-06-10 | End: 2024-06-13 | Stop reason: HOSPADM

## 2024-06-10 RX ORDER — GLUCAGON 1 MG
1 KIT INJECTION
Status: DISCONTINUED | OUTPATIENT
Start: 2024-06-10 | End: 2024-06-13 | Stop reason: HOSPADM

## 2024-06-10 RX ORDER — NALOXONE HCL 0.4 MG/ML
0.02 VIAL (ML) INJECTION
Status: DISCONTINUED | OUTPATIENT
Start: 2024-06-10 | End: 2024-06-13 | Stop reason: HOSPADM

## 2024-06-10 RX ORDER — TALC
9 POWDER (GRAM) TOPICAL NIGHTLY PRN
Status: DISCONTINUED | OUTPATIENT
Start: 2024-06-10 | End: 2024-06-13 | Stop reason: HOSPADM

## 2024-06-10 RX ORDER — METOPROLOL SUCCINATE 50 MG/1
100 TABLET, EXTENDED RELEASE ORAL DAILY
Status: DISCONTINUED | OUTPATIENT
Start: 2024-06-10 | End: 2024-06-13 | Stop reason: HOSPADM

## 2024-06-10 RX ORDER — ATORVASTATIN CALCIUM 40 MG/1
80 TABLET, FILM COATED ORAL DAILY
Status: DISCONTINUED | OUTPATIENT
Start: 2024-06-10 | End: 2024-06-13 | Stop reason: HOSPADM

## 2024-06-10 RX ORDER — SODIUM CHLORIDE 0.9 % (FLUSH) 0.9 %
10 SYRINGE (ML) INJECTION EVERY 12 HOURS PRN
Status: DISCONTINUED | OUTPATIENT
Start: 2024-06-10 | End: 2024-06-13 | Stop reason: HOSPADM

## 2024-06-10 RX ADMIN — FUROSEMIDE 40 MG: 40 TABLET ORAL at 08:06

## 2024-06-10 RX ADMIN — ATORVASTATIN CALCIUM 80 MG: 40 TABLET, FILM COATED ORAL at 08:06

## 2024-06-10 RX ADMIN — Medication 1000 MCG: at 08:06

## 2024-06-10 RX ADMIN — PANTOPRAZOLE SODIUM 20 MG: 20 TABLET, DELAYED RELEASE ORAL at 08:06

## 2024-06-10 RX ADMIN — INSULIN ASPART 2 UNITS: 100 INJECTION, SOLUTION INTRAVENOUS; SUBCUTANEOUS at 11:06

## 2024-06-10 RX ADMIN — IPRATROPIUM BROMIDE AND ALBUTEROL SULFATE 3 ML: .5; 3 SOLUTION RESPIRATORY (INHALATION) at 11:06

## 2024-06-10 RX ADMIN — ACETAMINOPHEN 650 MG: 325 TABLET ORAL at 08:06

## 2024-06-10 RX ADMIN — DIGOXIN 125 MCG: 125 TABLET ORAL at 08:06

## 2024-06-10 RX ADMIN — LEVOTHYROXINE SODIUM 175 MCG: 0.03 TABLET ORAL at 08:06

## 2024-06-10 RX ADMIN — METOPROLOL SUCCINATE 100 MG: 50 TABLET, EXTENDED RELEASE ORAL at 08:06

## 2024-06-10 RX ADMIN — DULOXETINE HYDROCHLORIDE 30 MG: 30 CAPSULE, DELAYED RELEASE ORAL at 08:06

## 2024-06-10 RX ADMIN — IPRATROPIUM BROMIDE AND ALBUTEROL SULFATE 3 ML: .5; 3 SOLUTION RESPIRATORY (INHALATION) at 04:06

## 2024-06-10 RX ADMIN — IPRATROPIUM BROMIDE AND ALBUTEROL SULFATE 3 ML: .5; 3 SOLUTION RESPIRATORY (INHALATION) at 06:06

## 2024-06-10 RX ADMIN — APIXABAN 5 MG: 2.5 TABLET, FILM COATED ORAL at 08:06

## 2024-06-10 RX ADMIN — DILTIAZEM HYDROCHLORIDE 120 MG: 120 CAPSULE, COATED, EXTENDED RELEASE ORAL at 08:06

## 2024-06-10 RX ADMIN — SODIUM CHLORIDE: 9 INJECTION, SOLUTION INTRAVENOUS at 01:06

## 2024-06-10 RX ADMIN — IPRATROPIUM BROMIDE AND ALBUTEROL SULFATE 3 ML: .5; 3 SOLUTION RESPIRATORY (INHALATION) at 07:06

## 2024-06-10 NOTE — SUBJECTIVE & OBJECTIVE
Past Medical History:   Diagnosis Date    Arthritis     Coronary artery disease     Diverticulitis     GERD (gastroesophageal reflux disease)     Hyperlipidemia     Hypertension        Past Surgical History:   Procedure Laterality Date    CARDIAC CATHETERIZATION      CARDIAC PACEMAKER PLACEMENT  2007    CARDIAC VALVE SURGERY      CHOLECYSTECTOMY      CORONARY ARTERY BYPASS GRAFT      HYSTERECTOMY      TOTAL KNEE ARTHROPLASTY Right     TOTAL KNEE ARTHROPLASTY Left        Review of patient's allergies indicates:   Allergen Reactions    Morphine Anxiety       No current facility-administered medications on file prior to encounter.     Current Outpatient Medications on File Prior to Encounter   Medication Sig    AEROCHAMBER PLUS FLOW-VU     albuterol (VENTOLIN HFA) 90 mcg/actuation inhaler Inhale 1 puff into the lungs every 4 (four) hours as needed for Wheezing.     apixaban (ELIQUIS) 5 mg Tab Take 1 tablet (5 mg total) by mouth 2 (two) times daily.    ascorbic acid, vitamin C, 500 mg Cap Take 500 mg by mouth once daily.    BAQSIMI 3 mg/actuation Spry 1 Dose by Each Nostril route daily as needed.    calcium carbonate-vitamin D3 600 mg-5 mcg (200 unit) Cap Take 1 capsule by mouth once daily.    CONTOUR TEST STRIPS Strp 1 strip by Misc.(Non-Drug; Combo Route) route 2 (two) times a day.     cyanocobalamin 500 MCG tablet Take 1,000 mcg by mouth once daily.    diclofenac sodium (VOLTAREN) 1 % Gel Apply 4 g topically 2 (two) times daily.    digoxin (LANOXIN) 125 mcg tablet Take 1 tablet (125 mcg total) by mouth once daily.    diltiaZEM (CARDIZEM CD) 120 MG Cp24 Take 1 capsule (120 mg total) by mouth once daily.    DULoxetine (CYMBALTA) 30 MG capsule Take 30 mg by mouth once daily.     fluticasone propionate (FLONASE) 50 mcg/actuation nasal spray 1 spray by Each Nostril route once daily.    furosemide (LASIX) 40 MG tablet Take 40 mg by mouth once daily.     glucose 4 GM chewable tablet Take 16 g by mouth as needed for Low  "blood sugar.    hydrocortisone 2.5 % cream Apply 1 application  topically as needed.    levothyroxine (SYNTHROID, LEVOTHROID) 175 MCG tablet Take 175 mcg by mouth once daily.     magnesium oxide (MAG-OX) 250 mg Tab Take 400 mg by mouth once daily.     metoprolol succinate (TOPROL-XL) 100 MG 24 hr tablet Take 1 tablet (100 mg total) by mouth once daily.    nitroGLYCERIN (NITROSTAT) 0.4 MG SL tablet Place 0.4 mg under the tongue every 5 (five) minutes as needed for Chest pain.    NOVOLOG MIX 70-30 FLEXPEN 100 unit/mL (70-30) InPn pen Inject into the skin 2 (two) times daily. 35 units if glucose <150  40 units if glucose >150    nystatin (MYCOSTATIN) cream Apply 100,000 Units topically 2 (two) times daily.    omega-3 fatty acids-fish oil 340-1,000 mg Cap Take 1 capsule by mouth once daily.    ondansetron (ZOFRAN-ODT) 4 MG TbDL Take 4 mg by mouth every 6 (six) hours as needed.    pantoprazole (PROTONIX) 20 MG tablet Take 20 mg by mouth once daily.     potassium gluconate 2.5 mEq Tab Take 3 tablets by mouth 2 (two) times a day.    pyridoxine, vitamin B6, (B-6) 100 MG Tab Take 1 tablet by mouth once daily.    rosuvastatin (CRESTOR) 20 MG tablet Take 20 mg by mouth once daily.     tobramycin-dexAMETHasone 0.3-0.1% (TOBRADEX) 0.3-0.1 % DrpS Place 1 drop into both eyes every 6 (six) hours.    triamcinolone acetonide 0.1% (KENALOG) 0.1 % cream Apply 1 g topically 2 (two) times daily.    TRUE METRIX GLUCOSE METER Misc     TRUEPLUS LANCETS 28 gauge Misc 1 lancet by Misc.(Non-Drug; Combo Route) route 2 (two) times a day.     TRUEPLUS LANCETS 33 gauge Misc 1 lancet by Misc.(Non-Drug; Combo Route) route 2 (two) times a day.     TRUEPLUS PEN NEEDLE 31 gauge x 1/4" Ndle 1 pen by Misc.(Non-Drug; Combo Route) route 2 (two) times a day.      Family History       Problem Relation (Age of Onset)    Hypertension Mother          Tobacco Use    Smoking status: Former    Smokeless tobacco: Never   Substance and Sexual Activity    Alcohol " use: No    Drug use: No    Sexual activity: Never     Review of Systems   Constitutional:  Positive for activity change, appetite change and fatigue. Negative for chills, diaphoresis and fever.   HENT:  Negative for congestion.    Eyes:  Positive for visual disturbance.   Respiratory:  Positive for cough and shortness of breath.    Cardiovascular:  Positive for leg swelling. Negative for chest pain.   Gastrointestinal:  Negative for abdominal distention, abdominal pain, nausea and vomiting.   Genitourinary:  Positive for decreased urine volume. Negative for difficulty urinating and hematuria.   Musculoskeletal:  Positive for arthralgias. Negative for back pain.   Neurological:  Positive for weakness. Negative for dizziness, syncope, light-headedness and headaches.     Objective:     Vital Signs (Most Recent):  Temp: 98.3 °F (36.8 °C) (06/10/24 0522)  Pulse: 80 (06/10/24 0521)  Resp: 18 (06/10/24 0521)  BP: 129/66 (06/10/24 0521)  SpO2: (!) 94 % (06/10/24 0521) Vital Signs (24h Range):  Temp:  [98.3 °F (36.8 °C)-98.7 °F (37.1 °C)] 98.3 °F (36.8 °C)  Pulse:  [70-80] 80  Resp:  [16-36] 18  SpO2:  [92 %-95 %] 94 %  BP: (109-132)/(56-81) 129/66     Weight: 82.5 kg (181 lb 12.8 oz)  Body mass index is 35.51 kg/m².     Physical Exam  Vitals and nursing note reviewed.   Constitutional:       Appearance: She is obese. She is ill-appearing.   Cardiovascular:      Rate and Rhythm: Normal rate.      Pulses: Normal pulses.      Comments: Ventricular paced  Pulmonary:      Effort: Pulmonary effort is normal. No respiratory distress.      Breath sounds: No rhonchi.   Abdominal:      General: Bowel sounds are normal. There is no distension.      Palpations: Abdomen is soft.      Tenderness: There is no abdominal tenderness.   Musculoskeletal:      Right lower leg: Edema present.      Left lower leg: Edema present.   Skin:     General: Skin is warm and dry.      Capillary Refill: Capillary refill takes less than 2 seconds.    Neurological:      Mental Status: Mental status is at baseline. She is lethargic.      GCS: GCS eye subscore is 4. GCS verbal subscore is 5. GCS motor subscore is 6.      Motor: Weakness present.                Significant Labs: All pertinent labs within the past 24 hours have been reviewed.  A1C:   Recent Labs   Lab 03/05/24  0620   HGBA1C 6.2     ABGs:   Recent Labs   Lab 06/09/24  2252   PH 7.408   PCO2 39.1   HCO3 24.6   POCSATURATED 93   BE 0   PO2 68*     Bilirubin:   Recent Labs   Lab 06/09/24  2235   BILITOT 1.1*       BMP:   Recent Labs   Lab 06/09/24  2235   *   *   K 4.5   CL 97   CO2 27   BUN 18   CREATININE 1.2   CALCIUM 8.7   MG 1.7     CBC:   Recent Labs   Lab 06/09/24 2235 06/10/24  0512   WBC 12.03 9.75   HGB 10.8* 10.9*   HCT 32.3* 32.8*    149*     CMP:   Recent Labs   Lab 06/09/24  2235   *   K 4.5   CL 97   CO2 27   *   BUN 18   CREATININE 1.2   CALCIUM 8.7   PROT 6.8   ALBUMIN 3.6   BILITOT 1.1*   ALKPHOS 57   AST 17   ALT 12   ANIONGAP 8     Cardiac Markers:   Recent Labs   Lab 06/09/24  2235   *     Coagulation:   Recent Labs   Lab 06/09/24  2235   INR 1.2       Magnesium:   Recent Labs   Lab 06/09/24  2235   MG 1.7       Troponin:   Recent Labs   Lab 06/09/24  2235   TROPONINIHS 24.0*       Urine Studies:   Recent Labs   Lab 06/10/24  0050   COLORU Yellow   APPEARANCEUA Clear   PHUR 6.0   SPECGRAV 1.020   PROTEINUA Negative   GLUCUA Negative   KETONESU Negative   BILIRUBINUA Negative   OCCULTUA Negative   NITRITE Negative   UROBILINOGEN Negative   LEUKOCYTESUR Negative       Significant Imaging: I have reviewed all pertinent imaging results/findings within the past 24 hours.    CT Head Without Contrast  Order: [id number]  Status: Final result       Visible to patient: No (inaccessible in Patient Portal)       Next appt: None    0 Result Notes  Details    Reading Physician Reading Date Result Priority   Baudilio Gustafson MD  [phone] 6/9/2024  STAT     Narrative & Impression  EXAMINATION:  CT HEAD WITHOUT CONTRAST     CLINICAL HISTORY:  Mental status change, unknown cause;     TECHNIQUE:  Low dose axial images were obtained through the head.  Coronal and sagittal reformations were also performed. Contrast was not administered.     COMPARISON:  03/26/2019.     FINDINGS:  The brain parenchyma appears normal for age with good corticomedullary differentiation.  There is no evidence of acute infarct, hemorrhage, or mass.  There is ventricular and sulcal enlargement consistent with generalized atrophy.  Mild patchy decreased supratentorial white matter attenuation most likely related to chronic nonspecific small vessel disease.   No mass-effect or midline shift.  There are no abnormal extra-axial fluid collections.  The paranasal sinuses and mastoid air cells are essentially clear .  The calvarium appears intact.  .     Impression:     No acute intracranial abnormalities.     Chronic findings, as above, similar compared to prior.        Electronically signed by:Baudilio Gustafson MD  Date:                                            06/09/2024  Time:                                           23:36           Exam Ended: 06/09/24 23:14 CDT Last Resulted: 06/09/24 23:36 CDT           X-Ray Chest AP Portable  Order: [id number]  Status: Final result       Visible to patient: No (inaccessible in Patient Portal)       Next appt: None    0 Result Notes  Details    Reading Physician Reading Date Result Priority   Caden Kapoor MD  [phone] 6/9/2024 STAT     Narrative & Impression  EXAMINATION:  XR CHEST AP PORTABLE     CLINICAL HISTORY:  altered mental status;     TECHNIQUE:  Single frontal view of the chest was performed.     COMPARISON:  None     FINDINGS:  Cardiac silhouette appears stable with TAVR valve.  Twin lead pacemaker in position.  The hazy opacities in perihilar regions have resolved with minimal streaky opacities in the lung bases..  Degenerative  changes in the spine and shoulders.     Impression:     There is improved perihilar infiltrates with minimal streaky opacities in both lung bases.  Correlate for residual or recurrent mild pulmonary edema versus chronic interstitial or atelectatic changes.        Electronically signed by:Caden Kapoor  Date:                                            06/09/2024  Time:                                           22:54           Exam Ended: 06/09/24 22:43 CDT Last Resulted: 06/09/24 22:54 CDT

## 2024-06-10 NOTE — CONSULTS
[facility]/Surg  Wound Care    Patient Name:  Milly Lee   MRN:  [id number]  Date: 6/10/2024  Diagnosis: Altered mental status        Precautions:     Allergies as of 06/09/2024 - Reviewed 06/09/2024   Allergen Reaction Noted    Morphine Anxiety 12/11/2022       WO Assessment Details/Treatment     Wound care consulted for wounds present on admit. Patient with stage 1 to her sacral area, MASD to left anterior pelvis/groin area and area beneath her left breast. Cleaned all areas with hibiclens soap and dried. Applied a thin layer of Triad to all areas and left open to air. Patient was noted to have a versette female catheter in place which was removed due to present on admit left pelvis/groin skin irritation. Plan of care was discussed with the patients nurse and PCT. Wound care will continue to follow up as needed throughout hospital stay.        06/10/24 1230   WOCN Assessment   WOCN Total Time (mins) 30   Visit Date 06/10/24   Visit Time 1230   Consult Type New   WOCN Speciality Wound   Wound pressure;moisture   Number of Wounds 3        Altered Skin Integrity 03/02/24 1401 midline;posterior Sacral spine #1 Incontinence associated dermatitis Intact skin with non-blanchable redness of localized area   Date First Assessed/Time First Assessed: 03/02/24 1401   Altered Skin Integrity Present on Admission - Did Patient arrive to the hospital with altered skin?: yes  Orientation: midline;posterior  Location: Sacral spine  Wound Number: #1  Is this injury...   Wound Image    Description of Altered Skin Integrity Intact skin with non-blanchable redness of localized area   Dressing Appearance Open to air;Clean   Drainage Amount None   Appearance Red   Care Antimicrobial agent;Applied:;Skin Barrier   Dressing Applied;Other (comment)  (Triad)        Wound 06/10/24 0540 Incontinence associated dermatitis Left anterior;lower Pelvis   Date First Assessed/Time First Assessed: 06/10/24 0540   Present on  Original Admission: Yes  Primary Wound Type: Incontinence associated dermatitis  Side: Left  Orientation: anterior;lower  Location: Pelvis   Wound Image     Dressing Appearance Open to air;Moist drainage   Drainage Amount Scant   Drainage Characteristics/Odor Serosanguineous   Appearance Pink   Tissue loss description Partial thickness   Care Cleansed with:;Antimicrobial agent;Applied:;Skin Barrier   Dressing Applied;Other (comment)  (Triad)        Wound 06/10/24 0540 Moisture associated dermatitis Left lower Breast   Date First Assessed/Time First Assessed: 06/10/24 0540   Primary Wound Type: Moisture associated dermatitis  Side: Left  Orientation: lower  Location: Breast   Wound Image    Dressing Appearance Open to air   Drainage Amount None   Appearance Red;Pink   Tissue loss description Not applicable   Care Cleansed with:;Antimicrobial agent;Applied:;Skin Barrier   Dressing Applied;Other (comment)  (Triad)       Recommendations made to primary team for Triad to all areas every shift and leave open to air. Avoid external female catheter. Orders placed.     06/10/2024

## 2024-06-10 NOTE — ED PROVIDER NOTES
Encounter Date: 6/9/2024       History     Chief Complaint   Patient presents with    Altered Mental Status     Per EMS patient family states that she took a flexeril and ultram earlier today and has been altered since. EMS states that patients O2 sat was 85 % on room air upon arrival to scene.      Pt w/ PMHx HFpEF, PPM, CABG, TAVR, HTN, GERD, chronic O2 dependence (3L) presents to ED by ambulance w/ AMS, low oxygen sats at home per family.  Per daughter at bedside, the patient typically lives with her other daughter.  She had a reported mechanical fall on 06/06 and was evaluated at an outside emergency department.  She had a negative CT of her head at that time.  She was diagnosed with a closed right proximal humerus fracture and prescribe Flexeril, Ultram and ibuprofen.  The sister noted that she would seem somewhat lethargic since the fall gradually worsening over the last couple of days.  Daughter at bedside went to  the patient from her sister's house today to bring her home with her.  She states she has been increasing lethargic throughout the day.  Her last dose of Flexeril and Ultram was at 10:00 a.m..  She was monitoring her oxygen saturation at home and it was reading in the low 80s despite attempting to increase to 5 L.  She made the decision to call 911.  On arrival the patient is somnolent but does arouse to verbal stimuli.  She is oriented to self and place.  Disoriented to time and year.  She is unable to verbalize any specific complaints.      Review of patient's allergies indicates:   Allergen Reactions    Morphine Anxiety     Past Medical History:   Diagnosis Date    Arthritis     Coronary artery disease     Diverticulitis     GERD (gastroesophageal reflux disease)     Hyperlipidemia     Hypertension      Past Surgical History:   Procedure Laterality Date    CARDIAC CATHETERIZATION      CARDIAC PACEMAKER PLACEMENT  2007    CARDIAC VALVE SURGERY      CHOLECYSTECTOMY      CORONARY ARTERY  BYPASS GRAFT      HYSTERECTOMY      TOTAL KNEE ARTHROPLASTY Right     TOTAL KNEE ARTHROPLASTY Left      Family History   Problem Relation Name Age of Onset    Hypertension Mother       Social History     Tobacco Use    Smoking status: Former    Smokeless tobacco: Never   Substance Use Topics    Alcohol use: No    Drug use: No     Review of Systems   Unable to perform ROS: Mental status change       Physical Exam     Initial Vitals [06/09/24 2223]   BP Pulse Resp Temp SpO2   111/69 76 (!) 36 98.7 °F (37.1 °C) 95 %      MAP       --         Physical Exam    Nursing note and vitals reviewed.  Constitutional:   Elderly, frail, chronically ill-appearing.  No distress.   HENT:   Head: Normocephalic and atraumatic.   Mouth/Throat: Oropharynx is clear and moist.   Eyes: EOM are normal.   Right pupil 2 mm, sluggish reactivity, left pupil 1 mm, sluggish reactivity.   Neck: Neck supple.   Normal range of motion.  Cardiovascular:  Normal rate and regular rhythm.           Pulmonary/Chest: Breath sounds normal.   Mildly tachypneic   Abdominal: Abdomen is soft. There is no abdominal tenderness. There is no rebound and no guarding.   Musculoskeletal:         General: Tenderness (over right proximal humerus region) present. No edema.      Cervical back: Normal range of motion and neck supple.     Neurological: GCS score is 15. GCS eye subscore is 4. GCS verbal subscore is 5. GCS motor subscore is 6.   Somnolent but easily arousable to verbal stimuli.  Globally weak but no apparent focal motor deficit.   Skin: Skin is warm and dry. Capillary refill takes less than 2 seconds. No rash noted.   Psychiatric:   Confused         ED Course   Procedures  Labs Reviewed   CBC W/ AUTO DIFFERENTIAL - Abnormal; Notable for the following components:       Result Value    RBC 3.57 (*)     Hemoglobin 10.8 (*)     Hematocrit 32.3 (*)     RDW 15.6 (*)     Gran # (ANC) 9.1 (*)     Immature Grans (Abs) 0.06 (*)     Mono # 1.4 (*)     Gran % 76.0 (*)      Lymph % 10.8 (*)     All other components within normal limits   COMPREHENSIVE METABOLIC PANEL - Abnormal; Notable for the following components:    Sodium 132 (*)     Glucose 196 (*)     Total Bilirubin 1.1 (*)     eGFR 43.5 (*)     All other components within normal limits   PROTIME-INR - Abnormal; Notable for the following components:    Prothrombin Time 13.2 (*)     All other components within normal limits   TROPONIN I HIGH SENSITIVITY - Abnormal; Notable for the following components:    Troponin I High Sensitivity 24.0 (*)     All other components within normal limits   B-TYPE NATRIURETIC PEPTIDE - Abnormal; Notable for the following components:     (*)     All other components within normal limits   ISTAT PROCEDURE - Abnormal; Notable for the following components:    POC PO2 68 (*)     All other components within normal limits   URINALYSIS, REFLEX TO URINE CULTURE    Narrative:     Specimen Source->Urine   MAGNESIUM   DRUG SCREEN PANEL, URINE EMERGENCY    Narrative:     Specimen Source->Urine     EKG Readings: (Independently Interpreted)   Paced rhythm.  HR 79.        Imaging Results              CT Head Without Contrast (Final result)  Result time 06/09/24 23:36:13      Final result by Baudilio Gustafson MD (06/09/24 23:36:13)                   Impression:      No acute intracranial abnormalities.    Chronic findings, as above, similar compared to prior.      Electronically signed by: Baudilio Gustafson MD  Date:    06/09/2024  Time:    23:36               Narrative:    EXAMINATION:  CT HEAD WITHOUT CONTRAST    CLINICAL HISTORY:  Mental status change, unknown cause;    TECHNIQUE:  Low dose axial images were obtained through the head.  Coronal and sagittal reformations were also performed. Contrast was not administered.    COMPARISON:  03/26/2019.    FINDINGS:  The brain parenchyma appears normal for age with good corticomedullary differentiation.  There is no evidence of acute infarct, hemorrhage, or  mass.  There is ventricular and sulcal enlargement consistent with generalized atrophy.  Mild patchy decreased supratentorial white matter attenuation most likely related to chronic nonspecific small vessel disease.   No mass-effect or midline shift.  There are no abnormal extra-axial fluid collections.  The paranasal sinuses and mastoid air cells are essentially clear .  The calvarium appears intact.  .                                       X-Ray Chest AP Portable (Final result)  Result time 06/09/24 22:54:37      Final result by Caden Kapoor MD (06/09/24 22:54:37)                   Impression:      There is improved perihilar infiltrates with minimal streaky opacities in both lung bases.  Correlate for residual or recurrent mild pulmonary edema versus chronic interstitial or atelectatic changes.      Electronically signed by: Caden Kapoor  Date:    06/09/2024  Time:    22:54               Narrative:    EXAMINATION:  XR CHEST AP PORTABLE    CLINICAL HISTORY:  altered mental status;    TECHNIQUE:  Single frontal view of the chest was performed.    COMPARISON:  None    FINDINGS:  Cardiac silhouette appears stable with TAVR valve.  Twin lead pacemaker in position.  The hazy opacities in perihilar regions have resolved with minimal streaky opacities in the lung bases..  Degenerative changes in the spine and shoulders.                                       Medications - No data to display  Medical Decision Making  Patient presents to ED as above.  She is tachypneic and hypoxic with otherwise stable vitals.  She was changed to an oxy mask due to mouth breathing at 4 L and is maintaining oxygen saturation in the mid 90s.  EKG is paced.  Differential includes but not limited to ICH, stroke, metabolic derangement, polypharmacy, infectious process.  CT brain independently reviewed by me and per radiology read, no acute intracranial process.  Chest x-ray independently reviewed by me and appears stable from  prior.  All labs reviewed by me and significant for ABG 7.40/39.1/68/24.6, normal WBC count, stable hemoglobin, glucose 196, troponin 24 and  which is elevated but below recent baseline.  UA negative for UTI.  I suspect majority of her symptoms are due to polypharmacy.  Will admit to hospitalist for observation.  She will be admitted to [facility] due to bed availability.    Amount and/or Complexity of Data Reviewed  Independent Historian: caregiver  External Data Reviewed: notes.  Labs: ordered. Decision-making details documented in ED Course.  Radiology: ordered and independent interpretation performed. Decision-making details documented in ED Course.  ECG/medicine tests: ordered and independent interpretation performed. Decision-making details documented in ED Course.    Risk  Decision regarding hospitalization.                              Attending Critical Care:   Critical Care Times:   Direct Patient Care (initial evaluation, reassessments, and time considering the case)................................................................10 minutes.   Additional History from reviewing old medical records or taking additional history from the family, EMS, PCP, etc.......................10 minutes.   Ordering, Reviewing, and Interpreting Diagnostic Studies...............................................................................................................10 minutes.   Documentation..................................................................................................................................................................................5 minutes.   ==============================================================  · Total Critical Care Time - exclusive of procedural time: 35 minutes.  ==============================================================  Critical care was necessary to treat or prevent imminent or life-threatening deterioration of the following conditions: AMS,  hypoxia.   Critical care was time spent personally by me on the following activities: obtaining history from patient or relative, examination of patient, review of x-rays / CT sent with the patient, ordering lab, x-rays, and/or EKG, development of treatment plan with patient or relative, ordering and performing treatments and interventions, interpretation of cardiac measurements and re-evaluation of patient's conition.   Critical Care Condition: potentially life-threatening             Clinical Impression:  Final diagnoses:  [R41.82] AMS (altered mental status) (Primary)  [J96.21] Acute on chronic respiratory failure with hypoxia  [Z79.899] Polypharmacy          ED Disposition Condition    Admit Stable                Ariana Castillo MD  06/10/24 0238

## 2024-06-10 NOTE — PLAN OF CARE
Problem: Occupational Therapy  Goal: Occupational Therapy Goal  Description: Goals to be met by: 7/8/2024     Patient will increase functional independence with ADLs by performing:    LE Dressing with Minimal Assistance and Assistive Devices as needed.  Grooming while seated at sink with Supervision.  Toileting from toilet with Stand-by Assistance for hygiene and clothing management.   Supine to sit with Stand-by Assistance.  Toilet transfer to toilet with Stand-by Assistance.    Outcome: Progressing

## 2024-06-10 NOTE — PLAN OF CARE
[facility] - Med/Surg  Initial Discharge Assessment       Primary Care Provider: Michela Christian MD    Admission Diagnosis: AMS (altered mental status) [R41.82]    Admission Date: 6/9/2024  Expected Discharge Date: 6/11/2024    Met with pt at bedside to complete discharge assessment, verified PCP, pharmacy and information on facesheet.  No dialysis or Coumadin.  See DME below. Pt has [facility].  Discharge recommendation is SNF.  Pt asked SW to discuss SNF with daughter and will allow her to select facility.    Transition of Care Barriers: None    Payor: [employer] MANAGED MEDICARE / Plan: [employer] HMO PPO SPECIAL NEEDS / Product Type: Medicare Advantage /     Extended Emergency Contact Information  Primary Emergency Contact: Sofie Chew  Address: [place], [place] [id number] [place] [address]  Home Phone: [phone]  Mobile Phone: [phone]  Relation: Daughter  Preferred language: English   needed? No  Secondary Emergency Contact: Corinne Rowell  Mobile Phone: [phone]  Relation: Daughter  Preferred language: English   needed? No    Discharge Plan A: Skilled Nursing Facility  Discharge Plan B: [facility] - [place], [place] - [address]  [address] [place] [address]  Phone: [phone] Fax: [phone]    [facility] - [facility] [address]  [address] [address]  Phone: [phone] Fax: [phone]      Initial Assessment (most recent)       Adult Discharge Assessment - 06/10/24 1406          Discharge Assessment    Assessment Type Discharge Planning Assessment     Confirmed/corrected address, phone number and insurance Yes     Confirmed Demographics Correct on Facesheet     Source of Information patient     Communicated ELDA with patient/caregiver No     People in Home child(ade), adult     Do you expect to return to your current living  situation? No   dc to SNF    Do you have help at home or someone to help you manage your care at home? Yes     Who are your caregiver(s) and their phone number(s)? daughter, Shyanne     Prior to hospitilization cognitive status: Alert/Oriented     Current cognitive status: Alert/Oriented     Home Accessibility wheelchair accessible     Home Layout Able to live on 1st floor     Equipment Currently Used at Home rollator;shower chair;oxygen     Patient currently being followed by outpatient case management? No     Do you currently have service(s) that help you manage your care at home? Yes     Name and Contact number of agency [facility]     Is the pt/caregiver preference to resume services with current agency Yes     Do you take prescription medications? Yes     Do you have prescription coverage? Yes     Coverage [employer] and Medicaid     Do you have any problems affording any of your prescribed medications? No     Is the patient taking medications as prescribed? yes     Who is going to help you get home at discharge? Daughter     How do you get to doctors appointments? car, drives self;family or friend will provide     Are you on dialysis? No     Do you take coumadin? No     Discharge Plan A Skilled Nursing Facility     Discharge Plan B Home Health     DME Needed Upon Discharge  none     Discharge Plan discussed with: Patient     Transition of Care Barriers None

## 2024-06-10 NOTE — NURSING
Pt came from [facility]. Pt on 3L Oxygen, alert and oriented. Sling present on Rt arm. Call light within reach, bed alarm on.

## 2024-06-10 NOTE — ASSESSMENT & PLAN NOTE
-possibly polypharmacy induces alter mental status change  -CT head negative acute abnormalities  -neuro checks  -fall precautions

## 2024-06-10 NOTE — HPI
Milly Lee is a 89 year old female with a past medical history of chronic diastolic heart failure, TAVR, pacemaker, CABG, atrial fibrillation on Eliquis, CAD, diabetes mellitus, CVA, right eye blindness, hypertension, hypothyroidism, GERD, and chronic home O2 dependence at 3 L who was transferred from [facility] with altered mental status and shortness of breath for 1 day. She is status post mechanical fall 06/06/2024 with closed fracture of proximal end of right humerus with sling in place. Her daughter at bedside is a retired nurse. Daughter reports she takes Flexeril, Ultram, and ibuprofen for pain every 8 hours and has been having increased lethargy since her morning dose. By this time patient has received 5 doses total and became lethargic. Daughter also reports decreasing urinary output and p.o. intake.  Patient denies chest pain, dizziness, lightheadedness, headaches, constipation/diarrhea, abdominal pain, nausea, or vomiting. ED workup: CT head no acute intracranial abnormalities.  Chest x-ray improved perihilar infiltrates with minimal streaky opacities and recurrent mild pulmonary edema versus chronic interstitial versus atelectasis. CBC with white count 12.0, and H&H stable. BMP with hyponatremia 132 and creatinine 1.2. Bili and LFTs within normal limits. Urinalysis negative. Last echo on 03/2024 EF 60-65% with normal diastolic function. ABGs reflective of hypoxemia PO2 68 otherwise within normal limits. Oxygen saturation on room air 85%, she was placed on OxyMask in ED due to mouth breathing sats improved to 92-95%.  Upon assessment patient arouses to voice oriented to person place time and situation. Daughter reports improving mental status since admission.  Admitted to hospital medicine for further management and treatment

## 2024-06-10 NOTE — PT/OT/SLP EVAL
Occupational Therapy   Evaluation    Name: Milly Lee  MRN: [id number]  Admitting Diagnosis: Altered mental status  Recent Surgery: * No surgery found *      Recommendations:     Discharge Recommendations: Moderate Intensity Therapy  Discharge Equipment Recommendations:  to be determined by next level of care  Barriers to discharge:  None    Assessment:     Milly Lee is a 89 y.o. female with a medical diagnosis of Altered mental status.  Patient in bed and very somnolent upon arrival to room. Noted improved mental alertness with increased mobility. Patient reports no pain in RUE when still, but c/o pain in RUE with minimal movement. Patient able to sit EOB requiring Mod A. Patient stood from bed requiring Min A with HHA, noting unsteadiness with taking steps from bed. Performance deficits affecting function: weakness, impaired endurance, impaired self care skills, impaired functional mobility, gait instability, impaired balance, decreased upper extremity function, decreased ROM, pain, orthopedic precautions.      Rehab Prognosis: Fair; patient would benefit from acute skilled OT services to address these deficits and reach maximum level of function.       Plan:     Patient to be seen 5 x/week to address the above listed problems via self-care/home management, therapeutic activities, therapeutic exercises  Plan of Care Expires: 07/08/24  Plan of Care Reviewed with: patient    Subjective     Chief Complaint: RUE pain with mobility  Patient/Family Comments/goals: none    Occupational Profile:  Living Environment: Patient lives with daughter and granddaughter in a H with 5 KELSEY and B HR's.   Previous level of function: Patient required SBA with most ADL, but required assist with dressing tasks. Patient was ambulatory with rollator.   Equipment Used at Home: cane, straight, rollator  Assistance upon Discharge: Patient will receive limited assist from family. Patient would benefit from SNF  placement.     Pain/Comfort:  Pain Rating 1: 0/10  Location - Side 1: Right  Location - Orientation 1: upper  Location 1: arm  Pain Rating Post-Intervention 1: 0/10    Patients cultural, spiritual, Buddhist conflicts given the current situation: no    Objective:     Communicated with: nurse Kennedy prior to session.  Patient found HOB elevated with oxygen, PureWick, telemetry upon OT entry to room.    General Precautions: Standard, fall  Orthopedic Precautions: RUE non weight bearing  Braces: UE Sling  Respiratory Status: Non-rebreather mask, flow 3 L/min    Occupational Performance:    Bed Mobility:    Patient completed Scooting/Bridging with moderate assistance  Patient completed Supine to Sit with moderate assistance    Functional Mobility/Transfers:  Patient completed Sit <> Stand Transfer with minimum assistance  with  hand-held assist   Patient completed Bed <> Chair Transfer using Stand Pivot technique with minimum assistance with hand-held assist    Activities of Daily Living:  Grooming: minimum assistance with all grooming tasks at EOB  Lower Body Dressing: maximal assistance with don/doffing socks at EOB  Toileting: dependence with Purewick in place    Cognitive/Visual Perceptual:  Cognitive/Psychosocial Skills:     -       Oriented to: Person   -       Follows Commands/attention:Follows two-step commands  -       Communication: clear/fluent  -       Safety awareness/insight to disability: impaired   -       Mood/Affect/Coping skills/emotional control: Cooperative  Visual/Perceptual:      -Intact     Physical Exam:  Postural examination/scapula alignment:    -       Rounded shoulders  -       Forward head  Dominant hand:    -       Right  Upper Extremity Range of Motion:     -       Right Upper Extremity: Not tested d/t UE in sling  -       Left Upper Extremity: WFL  Upper Extremity Strength:    -       Right Upper Extremity: Not tested d/t UE in sling  -       Left Upper Extremity: WFL   Strength:     -       Right Upper Extremity: WFL  -       Left Upper Extremity: WFL  Fine Motor Coordination:    -       Intact  Gross motor coordination:   Limited in BUE d/t RUE in sling    AMPAC 6 Click ADL:  AMPA Total Score: 14    Treatment & Education:  OT ed pt on OT role & POC as well as discharge recommendations.  OT ed pt & family on proper technique for shoulder brace removal and replacement and on UE dressing and bathing/hygiene techniques while keeping shoulder immobilized. OT educated pt on keeping hand at level of elbow or higher to minimize edema.       Patient left up in chair with all lines intact, call button in reach, and daughter present    GOALS:   Multidisciplinary Problems       Occupational Therapy Goals          Problem: Occupational Therapy    Goal Priority Disciplines Outcome Interventions   Occupational Therapy Goal     OT, PT/OT Progressing    Description: Goals to be met by: 7/8/2024     Patient will increase functional independence with ADLs by performing:    LE Dressing with Minimal Assistance and Assistive Devices as needed.  Grooming while seated at sink with Supervision.  Toileting from toilet with Stand-by Assistance for hygiene and clothing management.   Supine to sit with Stand-by Assistance.  Toilet transfer to toilet with Stand-by Assistance.                         History:     Past Medical History:   Diagnosis Date    Arthritis     Coronary artery disease     Diverticulitis     GERD (gastroesophageal reflux disease)     Hyperlipidemia     Hypertension          Past Surgical History:   Procedure Laterality Date    CARDIAC CATHETERIZATION      CARDIAC PACEMAKER PLACEMENT  2007    CARDIAC VALVE SURGERY      CHOLECYSTECTOMY      CORONARY ARTERY BYPASS GRAFT      HYSTERECTOMY      TOTAL KNEE ARTHROPLASTY Right     TOTAL KNEE ARTHROPLASTY Left        Time Tracking:     OT Date of Treatment: 06/10/24  OT Start Time: 0853  OT Stop Time: 0923  OT Total Time (min): 30 min    Billable  Minutes:Evaluation 6  Self Care/Home Management 24    6/10/2024

## 2024-06-10 NOTE — PLAN OF CARE
Texted pt's daughters SNF list via [employer].  Spoke to daughter, Val and she asked that sister, Sofie select SNFs.  Called Sofie and she selected [facility] and [facility].    SW sent clinicals to SNFs via [employer].    142 & pasrr in [employer]ir[facility] Stuff     06/10/24 1621   Post-Acute Status   Post-Acute Authorization Placement   Post-Acute Placement Status Referrals Sent   Patient choice form signed by patient/caregiver List from [employer]   Discharge Plan   Discharge Plan A Skilled Nursing Facility   Discharge Plan B Skilled Nursing Facility

## 2024-06-10 NOTE — PLAN OF CARE
Problem: Physical Therapy  Goal: Physical Therapy Goal  Description: Goals to be met by: 2024     Patient will increase functional independence with mobility by performin. Supine to sit with MInimal Assistance  2. Sit to stand transfer with Minimal Assistance  3. Bed to chair transfer with Minimal Assistance using hemiwalker  4. Gait  x 60 feet with Minimal Assistance using Andi-walker.   5. Lower extremity exercise program x20 reps     R humerus fx/sling 2024 and NWB    6/10/2024 1039 by Юлия Bey, PT  Outcome: Progressing   PT eval and treat. Sleepy with oximask 3 liters. Sling RUE. Gait HHA 15ft min/mod assist. OOB chair. Mod intensity

## 2024-06-10 NOTE — ASSESSMENT & PLAN NOTE
Patient with Hypoxic Respiratory failure which is Acute on chronic.  she is on home oxygen at 3 LPM. Supplemental oxygen was provided and noted-      .   Signs/symptoms of respiratory failure include- tachypnea, increased work of breathing, respiratory distress, and lethargy. Contributing diagnoses includes - CHF Labs and images were reviewed. Patient Has recent ABG, which has been reviewed. Will treat underlying causes and adjust management of respiratory failure as follows- supplemental oxygen, and duonebs

## 2024-06-10 NOTE — H&P
[facility] Medicine  History & Physical    Patient Name: Milly Lee  MRN: [id number]  Patient Class: IP- Inpatient  Admission Date: 6/9/2024  Attending Physician: Chan Tejada MD   Primary Care Provider: Michela Christian MD         Patient information was obtained from patient, relative(s), past medical records, and ER records.     Subjective:     Principal Problem:Altered mental status    Chief Complaint:   Chief Complaint   Patient presents with    Altered Mental Status     Per EMS patient family states that she took a flexeril and ultram earlier today and has been altered since. EMS states that patients O2 sat was 85 % on room air upon arrival to scene.         HPI: Milly Lee is a 89 year old female with a past medical history of chronic diastolic heart failure, TAVR, pacemaker, CABG, atrial fibrillation on Eliquis, CAD, diabetes mellitus, CVA, right eye blindness, hypertension, hypothyroidism, GERD, and chronic home O2 dependence at 3 L who was transferred from [facility] with altered mental status and shortness of breath for 1 day. She is status post mechanical fall 06/06/2024 with closed fracture of proximal end of right humerus. Her daughter at bedside is a retired nurse. Daughter reports she takes Flexeril, Ultram, and ibuprofen for pain every 8 hours and has been having increased lethargy since her morning dose. By this time patient has received 5 doses total and became lethargic. Daughter also reports decreasing urinary output and p.o. intake.  Patient denies chest pain, dizziness, lightheadedness, headaches, constipation/diarrhea, abdominal pain, nausea, or vomiting. ED workup: CT head no acute intracranial abnormalities.  Chest x-ray improved perihilar infiltrates with minimal streaky opacities and recurrent mild pulmonary edema versus chronic interstitial versus atelectasis. CBC with white count 12.0, and H&H stable. BMP with hyponatremia 132 and  creatinine 1.2. Bili and LFTs within normal limits. Urinalysis negative. Last echo on 03/2024 EF 60-65% with normal diastolic function. ABGs reflective of hypoxemia PO2 68 otherwise within normal limits. Oxygen saturation on room air 85%, she was placed on OxyMask in ED due to mouth breathing sats improved to 92-95%.  Upon assessment patient arouses to voice oriented to person place time and situation. Daughter reports improving mental status since admission.  Admitted to hospital medicine for further management and treatment        Past Medical History:   Diagnosis Date    Arthritis     Coronary artery disease     Diverticulitis     GERD (gastroesophageal reflux disease)     Hyperlipidemia     Hypertension        Past Surgical History:   Procedure Laterality Date    CARDIAC CATHETERIZATION      CARDIAC PACEMAKER PLACEMENT  2007    CARDIAC VALVE SURGERY      CHOLECYSTECTOMY      CORONARY ARTERY BYPASS GRAFT      HYSTERECTOMY      TOTAL KNEE ARTHROPLASTY Right     TOTAL KNEE ARTHROPLASTY Left        Review of patient's allergies indicates:   Allergen Reactions    Morphine Anxiety       No current facility-administered medications on file prior to encounter.     Current Outpatient Medications on File Prior to Encounter   Medication Sig    AEROCHAMBER PLUS FLOW-VU     albuterol (VENTOLIN HFA) 90 mcg/actuation inhaler Inhale 1 puff into the lungs every 4 (four) hours as needed for Wheezing.     apixaban (ELIQUIS) 5 mg Tab Take 1 tablet (5 mg total) by mouth 2 (two) times daily.    ascorbic acid, vitamin C, 500 mg Cap Take 500 mg by mouth once daily.    BAQSIMI 3 mg/actuation Spry 1 Dose by Each Nostril route daily as needed.    calcium carbonate-vitamin D3 600 mg-5 mcg (200 unit) Cap Take 1 capsule by mouth once daily.    CONTOUR TEST STRIPS Strp 1 strip by Misc.(Non-Drug; Combo Route) route 2 (two) times a day.     cyanocobalamin 500 MCG tablet Take 1,000 mcg by mouth once daily.    diclofenac sodium (VOLTAREN) 1 % Gel  Apply 4 g topically 2 (two) times daily.    digoxin (LANOXIN) 125 mcg tablet Take 1 tablet (125 mcg total) by mouth once daily.    diltiaZEM (CARDIZEM CD) 120 MG Cp24 Take 1 capsule (120 mg total) by mouth once daily.    DULoxetine (CYMBALTA) 30 MG capsule Take 30 mg by mouth once daily.     fluticasone propionate (FLONASE) 50 mcg/actuation nasal spray 1 spray by Each Nostril route once daily.    furosemide (LASIX) 40 MG tablet Take 40 mg by mouth once daily.     glucose 4 GM chewable tablet Take 16 g by mouth as needed for Low blood sugar.    hydrocortisone 2.5 % cream Apply 1 application  topically as needed.    levothyroxine (SYNTHROID, LEVOTHROID) 175 MCG tablet Take 175 mcg by mouth once daily.     magnesium oxide (MAG-OX) 250 mg Tab Take 400 mg by mouth once daily.     metoprolol succinate (TOPROL-XL) 100 MG 24 hr tablet Take 1 tablet (100 mg total) by mouth once daily.    nitroGLYCERIN (NITROSTAT) 0.4 MG SL tablet Place 0.4 mg under the tongue every 5 (five) minutes as needed for Chest pain.    NOVOLOG MIX 70-30 FLEXPEN 100 unit/mL (70-30) InPn pen Inject into the skin 2 (two) times daily. 35 units if glucose <150  40 units if glucose >150    nystatin (MYCOSTATIN) cream Apply 100,000 Units topically 2 (two) times daily.    omega-3 fatty acids-fish oil 340-1,000 mg Cap Take 1 capsule by mouth once daily.    ondansetron (ZOFRAN-ODT) 4 MG TbDL Take 4 mg by mouth every 6 (six) hours as needed.    pantoprazole (PROTONIX) 20 MG tablet Take 20 mg by mouth once daily.     potassium gluconate 2.5 mEq Tab Take 3 tablets by mouth 2 (two) times a day.    pyridoxine, vitamin B6, (B-6) 100 MG Tab Take 1 tablet by mouth once daily.    rosuvastatin (CRESTOR) 20 MG tablet Take 20 mg by mouth once daily.     tobramycin-dexAMETHasone 0.3-0.1% (TOBRADEX) 0.3-0.1 % DrpS Place 1 drop into both eyes every 6 (six) hours.    triamcinolone acetonide 0.1% (KENALOG) 0.1 % cream Apply 1 g topically 2 (two) times daily.    TRUE METRIX  "GLUCOSE METER Misc     TRUEPLUS LANCETS 28 gauge Misc 1 lancet by Misc.(Non-Drug; Combo Route) route 2 (two) times a day.     TRUEPLUS LANCETS 33 gauge Misc 1 lancet by Misc.(Non-Drug; Combo Route) route 2 (two) times a day.     TRUEPLUS PEN NEEDLE 31 gauge x 1/4" Ndle 1 pen by Misc.(Non-Drug; Combo Route) route 2 (two) times a day.      Family History       Problem Relation (Age of Onset)    Hypertension Mother          Tobacco Use    Smoking status: Former    Smokeless tobacco: Never   Substance and Sexual Activity    Alcohol use: No    Drug use: No    Sexual activity: Never     Review of Systems   Constitutional:  Positive for activity change, appetite change and fatigue. Negative for chills, diaphoresis and fever.   HENT:  Negative for congestion.    Eyes:  Positive for visual disturbance.   Respiratory:  Positive for cough and shortness of breath.    Cardiovascular:  Positive for leg swelling. Negative for chest pain.   Gastrointestinal:  Negative for abdominal distention, abdominal pain, nausea and vomiting.   Genitourinary:  Positive for decreased urine volume. Negative for difficulty urinating and hematuria.   Musculoskeletal:  Positive for arthralgias. Negative for back pain.   Neurological:  Positive for weakness. Negative for dizziness, syncope, light-headedness and headaches.     Objective:     Vital Signs (Most Recent):  Temp: 98.3 °F (36.8 °C) (06/10/24 0522)  Pulse: 80 (06/10/24 0521)  Resp: 18 (06/10/24 0521)  BP: 129/66 (06/10/24 0521)  SpO2: (!) 94 % (06/10/24 0521) Vital Signs (24h Range):  Temp:  [98.3 °F (36.8 °C)-98.7 °F (37.1 °C)] 98.3 °F (36.8 °C)  Pulse:  [70-80] 80  Resp:  [16-36] 18  SpO2:  [92 %-95 %] 94 %  BP: (109-132)/(56-81) 129/66     Weight: 82.5 kg (181 lb 12.8 oz)  Body mass index is 35.51 kg/m².     Physical Exam  Vitals and nursing note reviewed.   Constitutional:       Appearance: She is obese. She is ill-appearing.   Cardiovascular:      Rate and Rhythm: Normal rate.      " Pulses: Normal pulses.      Comments: Ventricular paced  Pulmonary:      Effort: Pulmonary effort is normal. No respiratory distress.      Breath sounds: No rhonchi.   Abdominal:      General: Bowel sounds are normal. There is no distension.      Palpations: Abdomen is soft.      Tenderness: There is no abdominal tenderness.   Musculoskeletal:      Right lower leg: Edema present.      Left lower leg: Edema present.   Skin:     General: Skin is warm and dry.      Capillary Refill: Capillary refill takes less than 2 seconds.   Neurological:      Mental Status: Mental status is at baseline. She is lethargic.      GCS: GCS eye subscore is 4. GCS verbal subscore is 5. GCS motor subscore is 6.      Motor: Weakness present.                Significant Labs: All pertinent labs within the past 24 hours have been reviewed.  A1C:   Recent Labs   Lab 03/05/24  0620   HGBA1C 6.2     ABGs:   Recent Labs   Lab 06/09/24  2252   PH 7.408   PCO2 39.1   HCO3 24.6   POCSATURATED 93   BE 0   PO2 68*     Bilirubin:   Recent Labs   Lab 06/09/24  2235   BILITOT 1.1*       BMP:   Recent Labs   Lab 06/09/24  2235   *   *   K 4.5   CL 97   CO2 27   BUN 18   CREATININE 1.2   CALCIUM 8.7   MG 1.7     CBC:   Recent Labs   Lab 06/09/24  2235 06/10/24  0512   WBC 12.03 9.75   HGB 10.8* 10.9*   HCT 32.3* 32.8*    149*     CMP:   Recent Labs   Lab 06/09/24  2235   *   K 4.5   CL 97   CO2 27   *   BUN 18   CREATININE 1.2   CALCIUM 8.7   PROT 6.8   ALBUMIN 3.6   BILITOT 1.1*   ALKPHOS 57   AST 17   ALT 12   ANIONGAP 8     Cardiac Markers:   Recent Labs   Lab 06/09/24  2235   *     Coagulation:   Recent Labs   Lab 06/09/24  2235   INR 1.2       Magnesium:   Recent Labs   Lab 06/09/24  2235   MG 1.7       Troponin:   Recent Labs   Lab 06/09/24 2235   TROPONINIHS 24.0*       Urine Studies:   Recent Labs   Lab 06/10/24  0050   COLORU Yellow   APPEARANCEUA Clear   PHUR 6.0   SPECGRAV 1.020   PROTEINUA Negative    GLUCUA Negative   KETONESU Negative   BILIRUBINUA Negative   OCCULTUA Negative   NITRITE Negative   UROBILINOGEN Negative   LEUKOCYTESUR Negative       Significant Imaging: I have reviewed all pertinent imaging results/findings within the past 24 hours.    CT Head Without Contrast  Order: [id number]  Status: Final result       Visible to patient: No (inaccessible in Patient Portal)       Next appt: None    0 Result Notes  Details    Reading Physician Reading Date Result Baudilio Ramirez MD  [phone] 6/9/2024 STAT     Narrative & Impression  EXAMINATION:  CT HEAD WITHOUT CONTRAST     CLINICAL HISTORY:  Mental status change, unknown cause;     TECHNIQUE:  Low dose axial images were obtained through the head.  Coronal and sagittal reformations were also performed. Contrast was not administered.     COMPARISON:  03/26/2019.     FINDINGS:  The brain parenchyma appears normal for age with good corticomedullary differentiation.  There is no evidence of acute infarct, hemorrhage, or mass.  There is ventricular and sulcal enlargement consistent with generalized atrophy.  Mild patchy decreased supratentorial white matter attenuation most likely related to chronic nonspecific small vessel disease.   No mass-effect or midline shift.  There are no abnormal extra-axial fluid collections.  The paranasal sinuses and mastoid air cells are essentially clear .  The calvarium appears intact.  .     Impression:     No acute intracranial abnormalities.     Chronic findings, as above, similar compared to prior.        Electronically signed by:Baudilio Gustafson MD  Date:                                            06/09/2024  Time:                                           23:36           Exam Ended: 06/09/24 23:14 CDT Last Resulted: 06/09/24 23:36 CDT           X-Ray Chest AP Portable  Order: [id number]  Status: Final result       Visible to patient: No (inaccessible in Patient Portal)       Next appt: None    0 Result  Notes  Details    Reading Physician Reading Date Result Priority   Caden Kapoor MD  [phone] 6/9/2024 STAT     Narrative & Impression  EXAMINATION:  XR CHEST AP PORTABLE     CLINICAL HISTORY:  altered mental status;     TECHNIQUE:  Single frontal view of the chest was performed.     COMPARISON:  None     FINDINGS:  Cardiac silhouette appears stable with TAVR valve.  Twin lead pacemaker in position.  The hazy opacities in perihilar regions have resolved with minimal streaky opacities in the lung bases..  Degenerative changes in the spine and shoulders.     Impression:     There is improved perihilar infiltrates with minimal streaky opacities in both lung bases.  Correlate for residual or recurrent mild pulmonary edema versus chronic interstitial or atelectatic changes.        Electronically signed by:Caden Kapoor  Date:                                            06/09/2024  Time:                                           22:54           Exam Ended: 06/09/24 22:43 CDT Last Resulted: 06/09/24 22:54 CDT           Assessment/Plan:     * Altered mental status  -possibly polypharmacy induces alter mental status change  -CT head negative acute abnormalities  -neuro checks  -fall precautions        Acute hypoxemic respiratory failure  Patient with Hypoxic Respiratory failure which is Acute on chronic.  she is on home oxygen at 3 LPM. Supplemental oxygen was provided and noted-      .   Signs/symptoms of respiratory failure include- tachypnea, increased work of breathing, respiratory distress, and lethargy. Contributing diagnoses includes - CHF Labs and images were reviewed. Patient Has recent ABG, which has been reviewed. Will treat underlying causes and adjust management of respiratory failure as follows- supplemental oxygen, and duonebs    CAD (coronary artery disease)  Patient with known CAD s/p CABG, which is controlled Will continue ASA and Statin and monitor for S/Sx of angina/ACS. Continue to monitor on  "telemetry.     Type 2 diabetes mellitus with stage 3 chronic kidney disease, with long-term current use of insulin  Patient's FSGs are uncontrolled due to hyperglycemia on current medication regimen.  Last A1c reviewed-   Lab Results   Component Value Date    HGBA1C 6.2 03/05/2024     Most recent fingerstick glucose reviewed- No results for input(s): "POCTGLUCOSE" in the last 24 hours.  Current correctional scale  Low  Maintain anti-hyperglycemic dose as follows-   Antihyperglycemics (From admission, onward)      Start     Stop Route Frequency Ordered    06/10/24 0619  insulin aspart U-100 pen 0-5 Units         -- SubQ Before meals & nightly PRN 06/10/24 0522          Hold Oral hypoglycemics while patient is in the hospital.      Cardiac pacemaker in situ  -chronic condition noted  -continuous cardiac monitoring      H/O prosthetic aortic valve replacement  -chronic condition noted  -cont eliquis   -monitor s/s of bleeding      VTE Risk Mitigation (From admission, onward)           Ordered     apixaban tablet 5 mg  2 times daily         06/10/24 0522     IP VTE HIGH RISK PATIENT  Once         06/10/24 0522     Place sequential compression device  Until discontinued         06/10/24 0522                                    Diane Shukla, ATA  Department of Hospital Medicine  [facility] - Med/Surg          "

## 2024-06-10 NOTE — CARE UPDATE
06/10/24 0715 06/10/24 0718 06/10/24 0726   Patient Assessment/Suction   Level of Consciousness (AVPU) responds to voice responds to voice responds to voice   Respiratory Effort  --   --  Mild   Expansion/Accessory Muscles/Retractions  --   --  abdominal muscle use   All Lung Fields Breath Sounds  --   --  Anterior:;Lateral:;diminished   Rhythm/Pattern, Respiratory  --   --  tachypneic   Cough Frequency  --   --  no cough   PRE-TX-O2   Device (Oxygen Therapy) nasal cannula Oxymask Oxymask   $ Is the patient on Low Flow Oxygen? Yes Yes Yes   Flow (L/min) (Oxygen Therapy) 2 3 3   SpO2 (!) 85 % (!) 94 % (!) 94 %   Pulse Oximetry Type Intermittent Intermittent Intermittent   $ Pulse Oximetry - Multiple Charge Pulse Oximetry - Multiple Pulse Oximetry - Multiple Pulse Oximetry - Multiple   Pulse 74 72 75   Resp (!) 24 (!) 24 (!) 24   Aerosol Therapy   $ Aerosol Therapy Charges  --   --  Aerosol Treatment   Respiratory Treatment Status (SVN)  --   --  given   Treatment Route (SVN)  --   --  mask;oxygen   Patient Position  --   --  HOB elevated   Post Treatment Assessment (SVN)  --   --  breath sounds unchanged   Signs of Intolerance (SVN)  --   --  none   Breath Sounds Post-Respiratory Treatment   Throughout All Fields Post-Treatment  --   --  All Fields   Throughout All Fields Post-Treatment  --   --  Anterior:;Lateral:;no change   Post-treatment Heart Rate (beats/min)  --   --  73   Post-treatment Resp Rate (breaths/min)  --   --  24   Respiratory Evaluation   $ Care Plan Tech Time  --   --  15 min   Evaluation For  --   --  Transfer   Admitting Diagnosis  --   --  Altered mental status   Cardiac Diagnosis  --   --  CAD, Pacemaker, chronic diastolic heart failure, TAVR, CABG, AFIB   Home Oxygen   Has Home Oxygen?  --   --  Yes   Liter Flow  --   --  3   Duration  --   --  continuous   Route  --   --  nasal cannula   Mode  --   --  continuous   Device  --   --  home concentrator   Home Aerosol, MDI, DPI, and Other  Treatments/Therapies   Home Respiratory Therapy Per Patient/Review of Chart  --   --  Yes   MDI Home Meds/Freq  --   --  Albuterol HFA/ PRN   Oxygen Care Plan   Rationale  --   --  Maintain Home oxygen   Bronchodilator Care Plan   Bronchodilator Care Plan  --   --  Aerosol   Aerosol Meds w/ frequency  --   --  Albuterol - Ipratropium (DUO-NEB) 0.5mg-3mg(2.5ml) 3ml Nebulizer Solution2.5mg Q 4Hr   Rationale  --   --  Maintain home respiratory medicine   Atelectasis Care Plan   Atelectasis Care Plan  --   --  Incentive Spiromentry   Frequency  --   --  TID   I.S. Goal (ml)  --   --  1200 ml   I.S. Minimum (ml)  --   --  600 ml   Rationale  --   --  Diminished;Other  (CXR impresion 6/9/2024  Correlate for residual or recurrent mild pulmonary edema vs chronic interstitial or atelectatic changes.)   Airway Clearance Care Plan   Rationale  --   --  No rationale found

## 2024-06-10 NOTE — ASSESSMENT & PLAN NOTE
"Patient's FSGs are uncontrolled due to hyperglycemia on current medication regimen.  Last A1c reviewed-   Lab Results   Component Value Date    HGBA1C 6.2 03/05/2024     Most recent fingerstick glucose reviewed- No results for input(s): "POCTGLUCOSE" in the last 24 hours.  Current correctional scale  Low  Maintain anti-hyperglycemic dose as follows-   Antihyperglycemics (From admission, onward)      Start     Stop Route Frequency Ordered    06/10/24 0619  insulin aspart U-100 pen 0-5 Units         -- SubQ Before meals & nightly PRN 06/10/24 0522          Hold Oral hypoglycemics while patient is in the hospital.    "

## 2024-06-10 NOTE — NURSING
Nurses Note -- 4 Eyes      6/10/2024   6:22 AM      Skin assessed during: Admit      [] No Altered Skin Integrity Present    []Prevention Measures Documented      [x] Yes- Altered Skin Integrity Present or Discovered   [x] LDA Added if Not in Epic (Describe Wound)   [x] New Altered Skin Integrity was Present on Admit and Documented in LDA   [x] Wound Image Taken    Wound Care Consulted? Yes    Attending Nurse:  Shelby Dominguez RN/Staff Member:  SUSANNE Conrad

## 2024-06-10 NOTE — PT/OT/SLP EVAL
Physical Therapy Evaluation    Patient Name:  Milly Lee   MRN:  [id number]    Recommendations:     Discharge Recommendations: Moderate Intensity Therapy   Discharge Equipment Recommendations: none   Barriers to discharge: Decreased caregiver support    Assessment:     Milly Lee is a 89 y.o. female admitted with a medical diagnosis of Altered mental status.  She presents with the following impairments/functional limitations: weakness, impaired endurance, impaired functional mobility, gait instability, impaired balance, impaired cognition, decreased upper extremity function, decreased lower extremity function, pain, decreased ROM, impaired cardiopulmonary response to activity, orthopedic precautions .    Pt seen seated in chair- sleepy with oximask and sling RUE. Pt easily awakened, gown replaced and sling re applied. Pt with acute R humerus fx due to fall 06-- no surgery. Pt seen for thera ex. Min/mod assist to ambulate HHA within room 15ft. OOb chair and late breakfast encouraged- pt feeding self with difficulty- pt is R hand dominant..  Mod intensity    Rehab Prognosis: Fair; patient would benefit from acute skilled PT services to address these deficits and reach maximum level of function.    Recent Surgery: * No surgery found *      Plan:     During this hospitalization, patient to be seen 6 x/week to address the identified rehab impairments via gait training, therapeutic activities, therapeutic exercises and progress toward the following goals:    Plan of Care Expires:  06/30/24    Subjective   Pt stated that she lived at home alone- has daughters that come  Stated that she uses O2 at nights  Chief Complaint: pt coughing and spitting thick phlegm  Pain R shoulder with movement  Patient/Family Comments/goals: none  Pain/Comfort:  Pain Rating 1:  (not rated)  Location - Side 1: Right  Location 1: arm  Pain Addressed 1: Cessation of Activity, Nurse notified, Reposition    Patients  cultural, spiritual, Jewish conflicts given the current situation:      Living Environment:  Home alone- has family/daughters that comes  Prior to admission, patients level of function was ambulatory.  Equipment used at home: rollator.  DME owned (not currently used): none.  Upon discharge, patient will have assistance from family.    Objective:     Communicated with nurse Kennedy prior to session.  Patient found up in chair with oxygen, PureWick, telemetry  upon PT entry to room.    General Precautions: Standard, fall, pacemaker  Orthopedic Precautions:RUE non weight bearing   Braces: UE Sling  Respiratory Status:  oximask 3 liters    Exams:  Postural Exam:  Patient presented with the following abnormalities:    -       Rounded shoulders  -       Forward head  -       BMI 35.26  RLE ROM: WFL  RLE Strength: WFL  LLE ROM: WFL  LLE Strength: WFL    Functional Mobility:  Transfers:     Sit to Stand:  moderate assistance with hand-held assist  Bed to Chair: minimum assistance and moderate assistance with  hand-held assist  using  Stand Pivot  Gait: 15ft with HHA with O2 min/mod assist with sling RUE      AM-PAC 6 CLICK MOBILITY  Total Score:16       Treatment & Education:  Patient was educated on the importance of OOB activity and functional mobility to negate negative effects of prolonged bed rest during hospitalization, safe transfers and ambulation, and D/C planning   OOB chair and repositioned  Pt assisted with late breakfast set up- able to feed self with difficulty using L hand. Pt is r hand dominant    Patient left up in chair with all lines intact, call button in reach, chair alarm on, and nurse Marcia notified.    GOALS:   Multidisciplinary Problems       Physical Therapy Goals          Problem: Physical Therapy    Goal Priority Disciplines Outcome Goal Variances Interventions   Physical Therapy Goal     PT, PT/OT Progressing     Description: Goals to be met by: 06-     Patient will increase  functional independence with mobility by performin. Supine to sit with MInimal Assistance  2. Sit to stand transfer with Minimal Assistance  3. Bed to chair transfer with Minimal Assistance using hemiwalker  4. Gait  x 60 feet with Minimal Assistance using Andi-walker.   5. Lower extremity exercise program x20 reps     R humerus fx/sling 2024 and NWB                         History:     Past Medical History:   Diagnosis Date    Arthritis     Coronary artery disease     Diverticulitis     GERD (gastroesophageal reflux disease)     Hyperlipidemia     Hypertension        Past Surgical History:   Procedure Laterality Date    CARDIAC CATHETERIZATION      CARDIAC PACEMAKER PLACEMENT      CARDIAC VALVE SURGERY      CHOLECYSTECTOMY      CORONARY ARTERY BYPASS GRAFT      HYSTERECTOMY      TOTAL KNEE ARTHROPLASTY Right     TOTAL KNEE ARTHROPLASTY Left        Time Tracking:     PT Received On: 06/10/24  PT Start Time: 0937     PT Stop Time: 1003  PT Total Time (min): 26 min     Billable Minutes: Evaluation 16 and Gait Training 10      06/10/2024

## 2024-06-11 PROBLEM — J96.01 ACUTE HYPOXEMIC RESPIRATORY FAILURE: Status: RESOLVED | Noted: 2024-03-02 | Resolved: 2024-06-11

## 2024-06-11 PROBLEM — I50.33 ACUTE ON CHRONIC DIASTOLIC (CONGESTIVE) HEART FAILURE: Status: RESOLVED | Noted: 2024-03-02 | Resolved: 2024-06-11

## 2024-06-11 PROBLEM — S42.291A CLOSED FRACTURE OF HEAD OF RIGHT HUMERUS: Status: ACTIVE | Noted: 2024-06-11

## 2024-06-11 PROBLEM — J96.11 CHRONIC RESPIRATORY FAILURE WITH HYPOXIA: Status: ACTIVE | Noted: 2024-06-11

## 2024-06-11 PROBLEM — M25.531 RIGHT WRIST PAIN: Status: ACTIVE | Noted: 2024-06-11

## 2024-06-11 LAB
BASOPHILS # BLD AUTO: 0.07 K/UL (ref 0–0.2)
BASOPHILS NFR BLD: 1 % (ref 0–1.9)
DIFFERENTIAL METHOD BLD: ABNORMAL
EOSINOPHIL # BLD AUTO: 0.3 K/UL (ref 0–0.5)
EOSINOPHIL NFR BLD: 4.3 % (ref 0–8)
ERYTHROCYTE [DISTWIDTH] IN BLOOD BY AUTOMATED COUNT: 15.5 % (ref 11.5–14.5)
HCT VFR BLD AUTO: 28.7 % (ref 37–48.5)
HGB BLD-MCNC: 9.7 G/DL (ref 12–16)
IMM GRANULOCYTES # BLD AUTO: 0.03 K/UL (ref 0–0.04)
IMM GRANULOCYTES NFR BLD AUTO: 0.4 % (ref 0–0.5)
LYMPHOCYTES # BLD AUTO: 1 K/UL (ref 1–4.8)
LYMPHOCYTES NFR BLD: 14.8 % (ref 18–48)
MCH RBC QN AUTO: 31.2 PG (ref 27–31)
MCHC RBC AUTO-ENTMCNC: 33.8 G/DL (ref 32–36)
MCV RBC AUTO: 92 FL (ref 82–98)
MONOCYTES # BLD AUTO: 0.7 K/UL (ref 0.3–1)
MONOCYTES NFR BLD: 9.4 % (ref 4–15)
NEUTROPHILS # BLD AUTO: 4.9 K/UL (ref 1.8–7.7)
NEUTROPHILS NFR BLD: 70.1 % (ref 38–73)
NRBC BLD-RTO: 0 /100 WBC
PLATELET # BLD AUTO: 117 K/UL (ref 150–450)
PMV BLD AUTO: 11.6 FL (ref 9.2–12.9)
POCT GLUCOSE: 159 MG/DL (ref 70–110)
POCT GLUCOSE: 170 MG/DL (ref 70–110)
POCT GLUCOSE: 199 MG/DL (ref 70–110)
POCT GLUCOSE: 224 MG/DL (ref 70–110)
RBC # BLD AUTO: 3.11 M/UL (ref 4–5.4)
WBC # BLD AUTO: 6.94 K/UL (ref 3.9–12.7)

## 2024-06-11 PROCEDURE — 94664 DEMO&/EVAL PT USE INHALER: CPT

## 2024-06-11 PROCEDURE — 27000221 HC OXYGEN, UP TO 24 HOURS

## 2024-06-11 PROCEDURE — 99900035 HC TECH TIME PER 15 MIN (STAT)

## 2024-06-11 PROCEDURE — 11000001 HC ACUTE MED/SURG PRIVATE ROOM

## 2024-06-11 PROCEDURE — 25000003 PHARM REV CODE 250: Performed by: STUDENT IN AN ORGANIZED HEALTH CARE EDUCATION/TRAINING PROGRAM

## 2024-06-11 PROCEDURE — 85025 COMPLETE CBC W/AUTO DIFF WBC: CPT

## 2024-06-11 PROCEDURE — 25000242 PHARM REV CODE 250 ALT 637 W/ HCPCS

## 2024-06-11 PROCEDURE — 94640 AIRWAY INHALATION TREATMENT: CPT

## 2024-06-11 PROCEDURE — 97116 GAIT TRAINING THERAPY: CPT

## 2024-06-11 PROCEDURE — 97535 SELF CARE MNGMENT TRAINING: CPT

## 2024-06-11 PROCEDURE — 63600175 PHARM REV CODE 636 W HCPCS

## 2024-06-11 PROCEDURE — 97110 THERAPEUTIC EXERCISES: CPT

## 2024-06-11 PROCEDURE — 36415 COLL VENOUS BLD VENIPUNCTURE: CPT

## 2024-06-11 PROCEDURE — 94761 N-INVAS EAR/PLS OXIMETRY MLT: CPT

## 2024-06-11 PROCEDURE — 25000003 PHARM REV CODE 250

## 2024-06-11 RX ADMIN — IPRATROPIUM BROMIDE AND ALBUTEROL SULFATE 3 ML: .5; 3 SOLUTION RESPIRATORY (INHALATION) at 06:06

## 2024-06-11 RX ADMIN — LEVOTHYROXINE SODIUM 175 MCG: 0.03 TABLET ORAL at 08:06

## 2024-06-11 RX ADMIN — INSULIN ASPART 2 UNITS: 100 INJECTION, SOLUTION INTRAVENOUS; SUBCUTANEOUS at 04:06

## 2024-06-11 RX ADMIN — PANTOPRAZOLE SODIUM 20 MG: 20 TABLET, DELAYED RELEASE ORAL at 08:06

## 2024-06-11 RX ADMIN — SENNOSIDES AND DOCUSATE SODIUM 1 TABLET: 50; 8.6 TABLET ORAL at 08:06

## 2024-06-11 RX ADMIN — FUROSEMIDE 40 MG: 40 TABLET ORAL at 08:06

## 2024-06-11 RX ADMIN — APIXABAN 5 MG: 2.5 TABLET, FILM COATED ORAL at 08:06

## 2024-06-11 RX ADMIN — IPRATROPIUM BROMIDE AND ALBUTEROL SULFATE 3 ML: .5; 3 SOLUTION RESPIRATORY (INHALATION) at 07:06

## 2024-06-11 RX ADMIN — ATORVASTATIN CALCIUM 80 MG: 40 TABLET, FILM COATED ORAL at 08:06

## 2024-06-11 RX ADMIN — Medication 1000 MCG: at 08:06

## 2024-06-11 RX ADMIN — DULOXETINE HYDROCHLORIDE 30 MG: 30 CAPSULE, DELAYED RELEASE ORAL at 08:06

## 2024-06-11 RX ADMIN — DIGOXIN 125 MCG: 125 TABLET ORAL at 08:06

## 2024-06-11 RX ADMIN — IPRATROPIUM BROMIDE AND ALBUTEROL SULFATE 3 ML: .5; 3 SOLUTION RESPIRATORY (INHALATION) at 11:06

## 2024-06-11 RX ADMIN — ACETAMINOPHEN 650 MG: 325 TABLET ORAL at 12:06

## 2024-06-11 RX ADMIN — METOPROLOL SUCCINATE 100 MG: 50 TABLET, EXTENDED RELEASE ORAL at 08:06

## 2024-06-11 RX ADMIN — DILTIAZEM HYDROCHLORIDE 120 MG: 120 CAPSULE, COATED, EXTENDED RELEASE ORAL at 08:06

## 2024-06-11 NOTE — CARE UPDATE
06/10/24 1856   Patient Assessment/Suction   Level of Consciousness (AVPU) alert   Respiratory Effort Unlabored   Expansion/Accessory Muscles/Retractions no retractions;no use of accessory muscles   All Lung Fields Breath Sounds diminished   CARLYN Breath Sounds diminished   LLL Breath Sounds diminished   RUL Breath Sounds coarse   RML Breath Sounds diminished   RLL Breath Sounds diminished   PRE-TX-O2   Device (Oxygen Therapy) nasal cannula   $ Is the patient on Low Flow Oxygen? Yes   Flow (L/min) (Oxygen Therapy) 3  (pt placed on aero tx and post tx placed on 3NC.)   SpO2 (!) (S)  72 %  (pt had NC on but was not plugged into the flow meter.)   Pulse Oximetry Type Intermittent   $ Pulse Oximetry - Multiple Charge Pulse Oximetry - Multiple   Pulse 79   Resp 20   Aerosol Therapy   $ Aerosol Therapy Charges Aerosol Treatment   Respiratory Treatment Status (SVN) given   Treatment Route (SVN) mask;oxygen   Patient Position HOB elevated   Post Treatment Assessment (SVN) breath sounds unchanged   Signs of Intolerance (SVN) none   Breath Sounds Post-Respiratory Treatment   Throughout All Fields Post-Treatment All Fields   Throughout All Fields Post-Treatment no change   Post-treatment Heart Rate (beats/min) 76   Post-treatment Resp Rate (breaths/min) 20   Vibratory PEP Therapy   $ Vibratory PEP Charges Aerobika Therapy   $ Vibratory PEP Tech Time Charges 15 min   Type (PEP Therapy) vibratory/oscillatory   Device (PEP Therapy) flutter   Route (PEP Therapy) proper technique demonstrated;mouthpiece   Breaths per Cycle (PEP Therapy) 10   Cycles (PEP Therapy) 1   Settings (PEP Therapy) PEP 5   Patient Position (PEP Therapy) HOB elevated   Signs of Intolerance (PEP Therapy) none

## 2024-06-11 NOTE — PT/OT/SLP PROGRESS
Occupational Therapy   Treatment    Name: Milly Lee  MRN: [id number]  Admitting Diagnosis:  Altered mental status       Recommendations:     Discharge Recommendations: Moderate Intensity Therapy  Discharge Equipment Recommendations:  to be determined by next level of care  Barriers to discharge:       Assessment:     Milly Lee is a 89 y.o. female with a medical diagnosis of Altered mental status.  She presents with the following performance deficits affecting function are weakness, impaired endurance, impaired self care skills, impaired functional mobility, gait instability, impaired balance, impaired cognition, decreased upper extremity function, decreased lower extremity function, pain, decreased ROM, impaired coordination, impaired fine motor, edema, impaired cardiopulmonary response to activity, orthopedic precautions. Pt up in chair and agreeable to OT. Pt c/o pain. OT assisted with repositioning. Pt required Max assist with repositioning in chair to improved midline orientation and increased support of RUE. Pt participated in grooming/hygiene while up in chair. Pt required max assist with combing hair, set up with washing face, and Mod assist with oral hygiene/denture care while sitting up in chair.Pt unable to demonstrate AROM R hand without severe pain. Edema noted MCP's digits 2 & 3. Pt was unable to tolerate gentle PROM as well. OT advised Nurse Rondon of concerns regarding R hand. OT provided education in calling for assist. Pt verbalized understanding.    Rehab Prognosis:  Good; patient would benefit from acute skilled OT services to address these deficits and reach maximum level of function.       Plan:     Patient to be seen 5 x/week to address the above listed problems via self-care/home management, therapeutic activities, therapeutic exercises  Plan of Care Expires: 07/08/24  Plan of Care Reviewed with: patient    Subjective     Chief Complaint: Pain RUE  Patient/Family  Comments/goals: To get better  Pain/Comfort:  Pain Rating 1: 9/10  Location - Side 1: Right  Location 1:  (arm and hand)  Pain Addressed 1: Nurse notified, Reposition    Objective:     Communicated with: Nurse Rondon prior to session.  Patient found up in chair with chair check, oxygen, PureWick, peripheral IV, telemetry upon OT entry to room.    General Precautions: Standard, fall, pacemaker    Orthopedic Precautions:RUE non weight bearing  Braces: UE Sling  Respiratory Status: Nasal cannula, flow 3 L/min     Occupational Performance:     Activities of Daily Living:  Grooming: Max assist with combing hair; Mod assist with oral/denture hygiene; Set up with washing face while up in chair. Pt R hand dominant and unable to use RUE at this time.         [facility] 6 Click ADL:      Treatment & Education:  OT provided education in role of OT. Patient verbalized understanding and participated in OT.  OT provided education in calling for assist. Patient verbalized understanding.      Patient left up in chair with all lines intact, call button in reach, chair alarm on, and Nurse Alcon  notified    GOALS:   Multidisciplinary Problems       Occupational Therapy Goals          Problem: Occupational Therapy    Goal Priority Disciplines Outcome Interventions   Occupational Therapy Goal     OT, PT/OT Progressing    Description: Goals to be met by: 7/8/2024     Patient will increase functional independence with ADLs by performing:    LE Dressing with Minimal Assistance and Assistive Devices as needed.  Grooming while seated at sink with Supervision.  Toileting from toilet with Stand-by Assistance for hygiene and clothing management.   Supine to sit with Stand-by Assistance.  Toilet transfer to toilet with Stand-by Assistance.                         Time Tracking:     OT Date of Treatment: 06/11/24  OT Start Time: 0937  OT Stop Time: 1015  OT Total Time (min): 38 min    Billable Minutes:Self Care/Home Management 38    OT/KAILYN: OT           6/11/2024

## 2024-06-11 NOTE — PLAN OF CARE
[facility] accepted, but per ELIAN Thomas CM, pt would like [facility].    SW called [facility] [phone], spoke to Lian, will submit for auth.     06/11/24 1150   Post-Acute Status   Post-Acute Authorization Placement   Post-Acute Placement Status Pending payor review/awaiting authorization (if required)   Discharge Plan   Discharge Plan A Skilled Nursing Facility   Discharge Plan B Skilled Nursing Facility

## 2024-06-11 NOTE — PT/OT/SLP PROGRESS
Physical Therapy Treatment    Patient Name:  Milly Lee   MRN:  [id number]    Recommendations:     Discharge Recommendations: Moderate Intensity Therapy  Discharge Equipment Recommendations: none  Barriers to discharge: Decreased caregiver support    Assessment:     Milly Lee is a 89 y.o. female admitted with a medical diagnosis of Altered mental status.  She presents with the following impairments/functional limitations: weakness, impaired endurance, impaired functional mobility, gait instability, impaired balance, impaired cognition, decreased upper extremity function, decreased lower extremity function, pain, decreased ROM, impaired cardiopulmonary response to activity, orthopedic precautions .    Pt awake/alert, talking on phone with daughter. Pt agreeable to PT. Pt completed thera ex in supine. Sat EOB and gown was changed- soiled. Sling intact and repositioned. Pt ambulated with HHA 20ft x2 with O2 at 3 liters and OOB chair.  Pt with good participation today. Pt to benefit from mod intensity therapies.    Rehab Prognosis: Fair; patient would benefit from acute skilled PT services to address these deficits and reach maximum level of function.    Recent Surgery: * No surgery found *      Plan:     During this hospitalization, patient to be seen 6 x/week to address the identified rehab impairments via gait training, therapeutic activities, therapeutic exercises and progress toward the following goals:    Plan of Care Expires:  06/30/24    Subjective   Pt alert and talkative  Chief Complaint: pain R UE when moved for gown changed or transfers  Patient/Family Comments/goals: get well  Pain/Comfort:  Pain Rating 1: 0/10      Objective:     Communicated with nurse Rondon prior to session.  Patient found HOB elevated with PureWick, telemetry, oxygen, bed alarm upon PT entry to room.     General Precautions: Standard, fall, pacemaker  Orthopedic Precautions: RUE non weight bearing  Braces: UE  Sling  Respiratory Status: Nasal cannula, flow 3 L/min     Functional Mobility:  Bed Mobility:     Scooting: minimum assistance  Supine to Sit: minimum assistance  Transfers:     Sit to Stand:  minimum assistance with no AD  Bed to Chair: minimum assistance with  no AD  using  Stand Pivot  Gait: 20ft x2 with HHA min assist x2 with chair following and seated rest. Sling RUE with NWB maintained      AM-PAC 6 CLICK MOBILITY          Treatment & Education:  Patient was educated on the importance of OOB activity and functional mobility to negate negative effects of prolonged bed rest during hospitalization, safe transfers and ambulation, and D/C planning   Thera ex to LE's in supine  OOB chair post PT with all needs within reach    Patient left up in chair with all lines intact, call button in reach, and chair alarm on..    GOALS:   Multidisciplinary Problems       Physical Therapy Goals          Problem: Physical Therapy    Goal Priority Disciplines Outcome Goal Variances Interventions   Physical Therapy Goal     PT, PT/OT Progressing     Description: Goals to be met by: 2024     Patient will increase functional independence with mobility by performin. Supine to sit with MInimal Assistance  2. Sit to stand transfer with Minimal Assistance  3. Bed to chair transfer with Minimal Assistance using hemiwalker  4. Gait  x 60 feet with Minimal Assistance using Andi-walker.   5. Lower extremity exercise program x20 reps     R humerus fx/sling 2024 and NWB                         Time Tracking:     PT Received On: 24  PT Start Time: 902     PT Stop Time: 934  PT Total Time (min): 32 min     Billable Minutes: Gait Training 20 and Therapeutic Exercise 12    Treatment Type: Treatment  PT/PTA: PT     Number of PTA visits since last PT visit: 0     2024

## 2024-06-11 NOTE — ASSESSMENT & PLAN NOTE
Patient's FSGs are controlled on current medication regimen.  Last A1c reviewed-   Lab Results   Component Value Date    HGBA1C 6.2 03/05/2024     Most recent fingerstick glucose reviewed-   Recent Labs   Lab 06/10/24  1654 06/10/24  2029 06/11/24  0725 06/11/24  1134   POCTGLUCOSE 169* 180* 159* 170*     Current correctional scale  Low  Maintain anti-hyperglycemic dose as follows-   Antihyperglycemics (From admission, onward)      Start     Stop Route Frequency Ordered    06/10/24 0619  insulin aspart U-100 pen 0-5 Units         -- SubQ Before meals & nightly PRN 06/10/24 0522          Hold Oral hypoglycemics while patient is in the hospital.

## 2024-06-11 NOTE — PLAN OF CARE
Problem: Occupational Therapy  Goal: Occupational Therapy Goal  Description: Goals to be met by: 7/8/2024     Patient will increase functional independence with ADLs by performing:    LE Dressing with Minimal Assistance and Assistive Devices as needed.  Grooming while seated at sink with Supervision.  Toileting from toilet with Stand-by Assistance for hygiene and clothing management.   Supine to sit with Stand-by Assistance.  Toilet transfer to toilet with Stand-by Assistance.    Outcome: Progressing  Mod assist with oral hygiene. Set up with washing face; Max assist with combing hair. Unable to tolerate AROM R wrist/hand. Edema noted MCP's of digits 2 & 3. Nurse Alcon advised.   Continue with POC

## 2024-06-11 NOTE — PROGRESS NOTES
[facility] Medicine  Progress Note    Patient Name: Milly Lee  MRN: [id number]  Patient Class: IP- Inpatient   Admission Date: 6/9/2024  Length of Stay: 1 days  Attending Physician: Chan Tejada MD  Primary Care Provider: Michela Christian MD        Subjective:     Principal Problem:Altered mental status        HPI:  Milly Lee is a 89 year old female with a past medical history of chronic diastolic heart failure, TAVR, pacemaker, CABG, atrial fibrillation on Eliquis, CAD, diabetes mellitus, CVA, right eye blindness, hypertension, hypothyroidism, GERD, and chronic home O2 dependence at 3 L who was transferred from [facility] with altered mental status and shortness of breath for 1 day. She is status post mechanical fall 06/06/2024 with closed fracture of proximal end of right humerus with sling in place. Her daughter at bedside is a retired nurse. Daughter reports she takes Flexeril, Ultram, and ibuprofen for pain every 8 hours and has been having increased lethargy since her morning dose. By this time patient has received 5 doses total and became lethargic. Daughter also reports decreasing urinary output and p.o. intake.  Patient denies chest pain, dizziness, lightheadedness, headaches, constipation/diarrhea, abdominal pain, nausea, or vomiting. ED workup: CT head no acute intracranial abnormalities.  Chest x-ray improved perihilar infiltrates with minimal streaky opacities and recurrent mild pulmonary edema versus chronic interstitial versus atelectasis. CBC with white count 12.0, and H&H stable. BMP with hyponatremia 132 and creatinine 1.2. Bili and LFTs within normal limits. Urinalysis negative. Last echo on 03/2024 EF 60-65% with normal diastolic function. ABGs reflective of hypoxemia PO2 68 otherwise within normal limits. Oxygen saturation on room air 85%, she was placed on OxyMask in ED due to mouth breathing sats improved to 92-95%.  Upon assessment patient  arouses to voice oriented to person place time and situation. Daughter reports improving mental status since admission.  Admitted to hospital medicine for further management and treatment        Overview/Hospital Course:  Admitted for lethargy and hypoxia attributed to recent use of flexeril and tramadol for her recent right humerus fracture s/p fall. Labs and imaging did not suggest other acute process such as infection. With time, her symptoms resolved. PT/OT recommended moderate continued therapy and we looked in to SNF. Complained of pain to right wrist with xray questioning scaphoid fracture. CT wrist pending.     Interval History: Patient seen and examined. NAEON. Seems to be back to mental baseline. Reports pain in right wrist/hand since fall prior to admit.      Objective:     Vital Signs (Most Recent):  Temp: 98.3 °F (36.8 °C) (06/11/24 1135)  Pulse: 85 (06/11/24 1135)  Resp: 17 (06/11/24 1135)  BP: 110/70 (06/11/24 1135)  SpO2: (!) 93 % (06/11/24 1135) Vital Signs (24h Range):  Temp:  [98 °F (36.7 °C)-98.3 °F (36.8 °C)] 98.3 °F (36.8 °C)  Pulse:  [72-87] 85  Resp:  [16-20] 17  SpO2:  [72 %-100 %] 93 %  BP: (102-131)/(54-80) 110/70     Weight: 83.9 kg (184 lb 15.5 oz)  Body mass index is 36.12 kg/m².    Intake/Output Summary (Last 24 hours) at 6/11/2024 1413  Last data filed at 6/11/2024 1343  Gross per 24 hour   Intake 480 ml   Output 900 ml   Net -420 ml         Physical Exam  Vitals reviewed.   Constitutional:       General: She is not in acute distress.     Appearance: She is obese.   HENT:      Head: Normocephalic and atraumatic.   Cardiovascular:      Rate and Rhythm: Normal rate and regular rhythm.   Pulmonary:      Effort: Pulmonary effort is normal. No respiratory distress.      Breath sounds: Normal breath sounds.   Musculoskeletal:      Comments: R arm in sling  Pain to R lateral wrist area on palpation, ROM limited by pain   Neurological:      General: No focal deficit present.      Mental  Status: She is alert and oriented to person, place, and time. Mental status is at baseline.   Psychiatric:         Mood and Affect: Affect normal.         Behavior: Behavior normal.         Thought Content: Thought content normal.             Significant Labs: All pertinent labs within the past 24 hours have been reviewed.    Significant Imaging: I have reviewed all pertinent imaging results/findings within the past 24 hours.    Assessment/Plan:      * Altered mental status  Attributed to flexeril and tramadol. Now resolved    Chronic respiratory failure with hypoxia  Baseline 3L    Right wrist pain  Xray questions scaphoid fracture  - CT wrist  - prn pain meds    Closed fracture of head of right humerus  - continue sling  - prn pain meds  - f/u ortho outpatient  - PT/OT  - looking into SNF    CAD (coronary artery disease)  Stable  - continue eliquis, statin, unclear if on asa    Type 2 diabetes mellitus with stage 3 chronic kidney disease, with long-term current use of insulin  Patient's FSGs are controlled on current medication regimen.  Last A1c reviewed-   Lab Results   Component Value Date    HGBA1C 6.2 03/05/2024     Most recent fingerstick glucose reviewed-   Recent Labs   Lab 06/10/24  1654 06/10/24  2029 06/11/24  0725 06/11/24  1134   POCTGLUCOSE 169* 180* 159* 170*     Current correctional scale  Low  Maintain anti-hyperglycemic dose as follows-   Antihyperglycemics (From admission, onward)      Start     Stop Route Frequency Ordered    06/10/24 0619  insulin aspart U-100 pen 0-5 Units         -- SubQ Before meals & nightly PRN 06/10/24 0522          Hold Oral hypoglycemics while patient is in the hospital.    Cardiac pacemaker in situ  Noted hx    H/O prosthetic aortic valve replacement  Noted hx      VTE Risk Mitigation (From admission, onward)           Ordered     apixaban tablet 5 mg  2 times daily         06/10/24 0522     IP VTE HIGH RISK PATIENT  Once         06/10/24 0522     Place sequential  compression device  Until discontinued         06/10/24 0522                    Discharge Planning   ELDA: 6/12/2024     Code Status: DNR   Is the patient medically ready for discharge?:     Reason for patient still in hospital (select all that apply): Patient trending condition, Imaging, and Pending disposition  Discharge Plan A: Skilled Nursing Facility                  Chan Tejada MD  Department of Hospital Medicine   [facility]/Surg

## 2024-06-11 NOTE — PLAN OF CARE
POC/Meds reviewed, pt verbalized understanding. Vitals stable.  Afebrile.   Tele In place-0520. Repositions self. Hourly/Q2hr rounding performed, safety maintained. Bed in lowest position, wheels locked, SR up x2, call light in easy reach. No  complaints at this time.

## 2024-06-11 NOTE — PLAN OF CARE
Problem: Physical Therapy  Goal: Physical Therapy Goal  Description: Goals to be met by: 2024     Patient will increase functional independence with mobility by performin. Supine to sit with MInimal Assistance  2. Sit to stand transfer with Minimal Assistance  3. Bed to chair transfer with Minimal Assistance using hemiwalker  4. Gait  x 60 feet with Minimal Assistance using Andi-walker.   5. Lower extremity exercise program x20 reps     R humerus fx/sling 2024 and NWB    Outcome: Progressing   Pt ambulated 20 x2 ft with min assist x2 and O2 at 3 liters with 1 seated rest. Thera ex. Pt more alert and interactive today. Sling RUE/NWB

## 2024-06-11 NOTE — HOSPITAL COURSE
Admitted for lethargy and hypoxia attributed to recent use of flexeril and tramadol for her recent right humerus fracture s/p fall. Labs and imaging did not suggest other acute process such as infection. With time, her symptoms resolved. PT/OT recommended moderate continued therapy and we looked in to SNF. Complained of pain to right wrist with xray questioning scaphoid fracture. CT wrist without obvious fracture. Accepted and transferred to SNF. By time of discharge the patient was tolerating a regular diet with resolved admission symptoms. Patient seen and examined on day of discharge.    Physical exam on day of discharge:  Constitutional:       General: She is not in acute distress.     Appearance: She is obese.   HENT:      Head: Normocephalic and atraumatic.   Cardiovascular:      Rate and Rhythm: Normal rate and regular rhythm.   Pulmonary:      Effort: Pulmonary effort is normal. No respiratory distress.      Breath sounds: Normal breath sounds.   Musculoskeletal:      Comments: R arm in sling  Pain to R lateral wrist area on palpation, ROM limited by pain   Neurological:      General: No focal deficit present.      Mental Status: She is alert and oriented to person, place, and time. Mental status is at baseline.   Psychiatric:         Mood and Affect: Affect normal.         Behavior: Behavior normal.         Thought Content: Thought content normal.

## 2024-06-11 NOTE — CARE UPDATE
06/11/24 0741   Patient Assessment/Suction   Level of Consciousness (AVPU) alert   Respiratory Effort Normal;Unlabored   Expansion/Accessory Muscles/Retractions expansion symmetric;no retractions;no use of accessory muscles   All Lung Fields Breath Sounds diminished   Rhythm/Pattern, Respiratory depth regular;pattern regular;unlabored   Cough Frequency infrequent   Cough Type nonproductive;dry;good   PRE-TX-O2   Device (Oxygen Therapy) nasal cannula with humidification   $ Is the patient on Low Flow Oxygen? Yes   Flow (L/min) (Oxygen Therapy) 3   SpO2 (!) 93 %   Pulse Oximetry Type Intermittent   $ Pulse Oximetry - Multiple Charge Pulse Oximetry - Multiple   Pulse 87   Resp 18   Positioning HOB elevated 45 degrees   Aerosol Therapy   $ Aerosol Therapy Charges Aerosol Treatment   Daily Review of Necessity (SVN) completed   Respiratory Treatment Status (SVN) given   Treatment Route (SVN) mask   Patient Position HOB elevated   Post Treatment Assessment (SVN) breath sounds unchanged   Signs of Intolerance (SVN) none   Breath Sounds Post-Respiratory Treatment   Throughout All Fields Post-Treatment All Fields   Throughout All Fields Post-Treatment no change   Post-treatment Heart Rate (beats/min) 88   Post-treatment Resp Rate (breaths/min) 18   Vibratory PEP Therapy   $ Vibratory PEP Charges Aerobika Therapy   $ Vibratory PEP Tech Time Charges 15 min   Daily Review of Necessity (PEP Therapy) completed   Type (PEP Therapy) vibratory/oscillatory   Device (PEP Therapy) flutter   Route (PEP Therapy) mouthpiece   Breaths per Cycle (PEP Therapy) 10   Cycles (PEP Therapy) 1   Settings (PEP Therapy) PEP 5   Post Treatment Assessment (PEP) breath sounds unchanged   Signs of Intolerance (PEP Therapy) none

## 2024-06-12 LAB
POCT GLUCOSE: 135 MG/DL (ref 70–110)
POCT GLUCOSE: 165 MG/DL (ref 70–110)
POCT GLUCOSE: 199 MG/DL (ref 70–110)
POCT GLUCOSE: 249 MG/DL (ref 70–110)

## 2024-06-12 PROCEDURE — 97530 THERAPEUTIC ACTIVITIES: CPT | Mod: CQ

## 2024-06-12 PROCEDURE — 99900035 HC TECH TIME PER 15 MIN (STAT)

## 2024-06-12 PROCEDURE — 25000242 PHARM REV CODE 250 ALT 637 W/ HCPCS

## 2024-06-12 PROCEDURE — 27000221 HC OXYGEN, UP TO 24 HOURS

## 2024-06-12 PROCEDURE — 25000003 PHARM REV CODE 250

## 2024-06-12 PROCEDURE — 97530 THERAPEUTIC ACTIVITIES: CPT

## 2024-06-12 PROCEDURE — 94664 DEMO&/EVAL PT USE INHALER: CPT

## 2024-06-12 PROCEDURE — 97116 GAIT TRAINING THERAPY: CPT | Mod: CQ

## 2024-06-12 PROCEDURE — 30200315 PPD INTRADERMAL TEST REV CODE 302: Performed by: STUDENT IN AN ORGANIZED HEALTH CARE EDUCATION/TRAINING PROGRAM

## 2024-06-12 PROCEDURE — 25000003 PHARM REV CODE 250: Performed by: STUDENT IN AN ORGANIZED HEALTH CARE EDUCATION/TRAINING PROGRAM

## 2024-06-12 PROCEDURE — 94640 AIRWAY INHALATION TREATMENT: CPT

## 2024-06-12 PROCEDURE — 94761 N-INVAS EAR/PLS OXIMETRY MLT: CPT

## 2024-06-12 PROCEDURE — 86580 TB INTRADERMAL TEST: CPT | Performed by: STUDENT IN AN ORGANIZED HEALTH CARE EDUCATION/TRAINING PROGRAM

## 2024-06-12 PROCEDURE — 11000001 HC ACUTE MED/SURG PRIVATE ROOM

## 2024-06-12 RX ADMIN — TUBERCULIN PURIFIED PROTEIN DERIVATIVE 5 UNITS: 5 INJECTION, SOLUTION INTRADERMAL at 04:06

## 2024-06-12 RX ADMIN — LEVOTHYROXINE SODIUM 175 MCG: 0.03 TABLET ORAL at 05:06

## 2024-06-12 RX ADMIN — DILTIAZEM HYDROCHLORIDE 120 MG: 120 CAPSULE, COATED, EXTENDED RELEASE ORAL at 08:06

## 2024-06-12 RX ADMIN — ACETAMINOPHEN 650 MG: 325 TABLET ORAL at 03:06

## 2024-06-12 RX ADMIN — SENNOSIDES AND DOCUSATE SODIUM 1 TABLET: 50; 8.6 TABLET ORAL at 09:06

## 2024-06-12 RX ADMIN — FUROSEMIDE 40 MG: 40 TABLET ORAL at 08:06

## 2024-06-12 RX ADMIN — IPRATROPIUM BROMIDE AND ALBUTEROL SULFATE 3 ML: .5; 3 SOLUTION RESPIRATORY (INHALATION) at 07:06

## 2024-06-12 RX ADMIN — APIXABAN 5 MG: 2.5 TABLET, FILM COATED ORAL at 08:06

## 2024-06-12 RX ADMIN — ATORVASTATIN CALCIUM 80 MG: 40 TABLET, FILM COATED ORAL at 08:06

## 2024-06-12 RX ADMIN — SENNOSIDES AND DOCUSATE SODIUM 1 TABLET: 50; 8.6 TABLET ORAL at 08:06

## 2024-06-12 RX ADMIN — Medication 1000 MCG: at 08:06

## 2024-06-12 RX ADMIN — ACETAMINOPHEN 650 MG: 325 TABLET ORAL at 08:06

## 2024-06-12 RX ADMIN — ACETAMINOPHEN 650 MG: 325 TABLET ORAL at 09:06

## 2024-06-12 RX ADMIN — DIGOXIN 125 MCG: 125 TABLET ORAL at 08:06

## 2024-06-12 RX ADMIN — METOPROLOL SUCCINATE 100 MG: 50 TABLET, EXTENDED RELEASE ORAL at 08:06

## 2024-06-12 RX ADMIN — INSULIN ASPART 1 UNITS: 100 INJECTION, SOLUTION INTRAVENOUS; SUBCUTANEOUS at 08:06

## 2024-06-12 RX ADMIN — DULOXETINE HYDROCHLORIDE 30 MG: 30 CAPSULE, DELAYED RELEASE ORAL at 08:06

## 2024-06-12 RX ADMIN — IPRATROPIUM BROMIDE AND ALBUTEROL SULFATE 3 ML: .5; 3 SOLUTION RESPIRATORY (INHALATION) at 11:06

## 2024-06-12 RX ADMIN — PANTOPRAZOLE SODIUM 20 MG: 20 TABLET, DELAYED RELEASE ORAL at 08:06

## 2024-06-12 NOTE — PLAN OF CARE
Problem: Physical Therapy  Goal: Physical Therapy Goal  Description: Goals to be met by: 2024     Patient will increase functional independence with mobility by performin. Supine to sit with MInimal Assistance  2. Sit to stand transfer with Minimal Assistance  3. Bed to chair transfer with Minimal Assistance using hemiwalker  4. Gait  x 60 feet with Minimal Assistance using Andi-walker.   5. Lower extremity exercise program x20 reps     R humerus fx/sling 2024 and NWB    Outcome: Progressing   Ambulate with assistance for safety, NWWILSON MOTA.

## 2024-06-12 NOTE — PT/OT/SLP PROGRESS
Occupational Therapy   Treatment    Name: Milly Lee  MRN: [id number]  Admitting Diagnosis:  Altered mental status       Recommendations:     Discharge Recommendations: Moderate Intensity Therapy  Discharge Equipment Recommendations:  to be determined by next level of care  Barriers to discharge:  None    Assessment:     Milly Lee is a 89 y.o. female with a medical diagnosis of Altered mental status. Performance deficits affecting function are weakness, impaired endurance, impaired self care skills, impaired functional mobility, gait instability, impaired balance, decreased upper extremity function, decreased coordination, pain, decreased ROM, orthopedic precautions, impaired cardiopulmonary response to activity.     Rehab Prognosis:  Fair; patient would benefit from acute skilled OT services to address these deficits and reach maximum level of function.       Plan:     Patient to be seen 5 x/week to address the above listed problems via self-care/home management, therapeutic activities, therapeutic exercises  Plan of Care Expires: 07/08/24  Plan of Care Reviewed with: patient, daughter    Subjective     Chief Complaint: RUE pain  Patient/Family Comments/goals: none  Pain/Comfort:  Pain Rating 1: 10/10  Location - Side 1: Right  Location - Orientation 1: upper  Location 1: arm  Pain Addressed 1: Reposition, Cessation of Activity, Distraction  Pain Rating Post-Intervention 1: 10/10    Objective:     Communicated with: nurse Rondon prior to session.  Patient found up in chair with telemetry upon OT entry to room.    General Precautions: Standard, fall, pacemaker    Orthopedic Precautions:RUE non weight bearing  Braces: UE Sling  Respiratory Status: Nasal cannula, flow 3 L/min     Occupational Performance:     Functional Mobility/Transfers:  Patient completed Sit <> Stand Transfer with moderate assistance  with  hand-held assist   Functional Mobility: Noted minimal unsteadiness and posterior  leaning when initially standing requiring Min A to regain balance    Activities of Daily Living:  Toileting: dependence with adult brief in place      [facility] 6 Click ADL:      Treatment & Education:  OT reviewed with patient proper positioning for RUE in sling.   OT adjusted RUE in sling for increased comfort and reduced edema.   OT educated patient's daughter proper UE sling fit and RUE positioning while standing and sitting.    Patient left up in chair with all lines intact, call button in reach, and daughter present    GOALS:   Multidisciplinary Problems       Occupational Therapy Goals          Problem: Occupational Therapy    Goal Priority Disciplines Outcome Interventions   Occupational Therapy Goal     OT, PT/OT Progressing    Description: Goals to be met by: 7/8/2024     Patient will increase functional independence with ADLs by performing:    LE Dressing with Minimal Assistance and Assistive Devices as needed.  Grooming while seated at sink with Supervision.  Toileting from toilet with Stand-by Assistance for hygiene and clothing management.   Supine to sit with Stand-by Assistance.  Toilet transfer to toilet with Stand-by Assistance.                         Time Tracking:     OT Date of Treatment: 06/12/24  OT Start Time: 1035  OT Stop Time: 1052  OT Total Time (min): 17 min    Billable Minutes:Self Care/Home Management 17    OT/KAILYN: OT          6/12/2024

## 2024-06-12 NOTE — PLAN OF CARE
POC/Meds reviewed, pt verbalized understanding. Vitals stable.  Afebrile.   Tele In place-[id number]. Accuchecks monitored. Prn medication given. Up to BCS.Repositions self. Hourly/Q2hr rounding performed, safety maintained. Bed in lowest position, wheels locked, SR up x2, call light in easy reach. No  complaints at this time.

## 2024-06-12 NOTE — PROGRESS NOTES
[facility] Medicine  Progress Note    Patient Name: Milly Lee  MRN: [id number]  Patient Class: IP- Inpatient   Admission Date: 6/9/2024  Length of Stay: 2 days  Attending Physician: Chan Tejada MD  Primary Care Provider: Michela Christian MD        Subjective:     Principal Problem:Altered mental status        HPI:  Milly Lee is a 89 year old female with a past medical history of chronic diastolic heart failure, TAVR, pacemaker, CABG, atrial fibrillation on Eliquis, CAD, diabetes mellitus, CVA, right eye blindness, hypertension, hypothyroidism, GERD, and chronic home O2 dependence at 3 L who was transferred from [facility] with altered mental status and shortness of breath for 1 day. She is status post mechanical fall 06/06/2024 with closed fracture of proximal end of right humerus with sling in place. Her daughter at bedside is a retired nurse. Daughter reports she takes Flexeril, Ultram, and ibuprofen for pain every 8 hours and has been having increased lethargy since her morning dose. By this time patient has received 5 doses total and became lethargic. Daughter also reports decreasing urinary output and p.o. intake.  Patient denies chest pain, dizziness, lightheadedness, headaches, constipation/diarrhea, abdominal pain, nausea, or vomiting. ED workup: CT head no acute intracranial abnormalities.  Chest x-ray improved perihilar infiltrates with minimal streaky opacities and recurrent mild pulmonary edema versus chronic interstitial versus atelectasis. CBC with white count 12.0, and H&H stable. BMP with hyponatremia 132 and creatinine 1.2. Bili and LFTs within normal limits. Urinalysis negative. Last echo on 03/2024 EF 60-65% with normal diastolic function. ABGs reflective of hypoxemia PO2 68 otherwise within normal limits. Oxygen saturation on room air 85%, she was placed on OxyMask in ED due to mouth breathing sats improved to 92-95%.  Upon assessment patient  arouses to voice oriented to person place time and situation. Daughter reports improving mental status since admission.  Admitted to hospital medicine for further management and treatment        Overview/Hospital Course:  Admitted for lethargy and hypoxia attributed to recent use of flexeril and tramadol for her recent right humerus fracture s/p fall. Labs and imaging did not suggest other acute process such as infection. With time, her symptoms resolved. PT/OT recommended moderate continued therapy and we looked in to SNF. Complained of pain to right wrist with xray questioning scaphoid fracture. CT wrist pending.     Interval History: Patient seen and examined. NICOLEON. Accepted to SNF. Pending CT scan wrist to ensure if ortho recommendations are needed prior to SNF.      Objective:     Vital Signs (Most Recent):  Temp: 97.4 °F (36.3 °C) (06/12/24 1123)  Pulse: 77 (06/12/24 1123)  Resp: 17 (06/12/24 1123)  BP: (!) 113/56 (06/12/24 1123)  SpO2: (!) 94 % (06/12/24 1123) Vital Signs (24h Range):  Temp:  [97.4 °F (36.3 °C)-98.3 °F (36.8 °C)] 97.4 °F (36.3 °C)  Pulse:  [71-84] 77  Resp:  [16-18] 17  SpO2:  [91 %-95 %] 94 %  BP: ()/(53-67) 113/56     Weight: 83.7 kg (184 lb 8.4 oz)  Body mass index is 36.04 kg/m².    Intake/Output Summary (Last 24 hours) at 6/12/2024 1430  Last data filed at 6/11/2024 1847  Gross per 24 hour   Intake 240 ml   Output --   Net 240 ml         Physical Exam  Vitals reviewed.   Constitutional:       General: She is not in acute distress.     Appearance: She is obese.   HENT:      Head: Normocephalic and atraumatic.   Cardiovascular:      Rate and Rhythm: Normal rate and regular rhythm.   Pulmonary:      Effort: Pulmonary effort is normal. No respiratory distress.      Breath sounds: Normal breath sounds.   Musculoskeletal:      Comments: R arm in sling  Pain to R lateral wrist area on palpation, ROM limited by pain   Neurological:      General: No focal deficit present.      Mental  Status: She is alert and oriented to person, place, and time. Mental status is at baseline.   Psychiatric:         Mood and Affect: Affect normal.         Behavior: Behavior normal.         Thought Content: Thought content normal.             Significant Labs: All pertinent labs within the past 24 hours have been reviewed.    Significant Imaging: I have reviewed all pertinent imaging results/findings within the past 24 hours.    Assessment/Plan:      * Altered mental status  Attributed to flexeril and tramadol. Now resolved    Chronic respiratory failure with hypoxia  Baseline 3L    Right wrist pain  Xray questions scaphoid fracture  - CT wrist still pending read  - prn pain meds    Closed fracture of head of right humerus  - continue sling  - prn pain meds  - f/u ortho outpatient  - PT/OT  - looking into SNF    CAD (coronary artery disease)  Stable  - continue eliquis, statin, unclear if on asa    Type 2 diabetes mellitus with stage 3 chronic kidney disease, with long-term current use of insulin  Patient's FSGs are controlled on current medication regimen.  Last A1c reviewed-   Lab Results   Component Value Date    HGBA1C 6.2 03/05/2024     Most recent fingerstick glucose reviewed-   Recent Labs   Lab 06/11/24  1603 06/11/24  2041 06/12/24  0720 06/12/24  1121   POCTGLUCOSE 224* 199* 135* 165*       Current correctional scale  Low  Maintain anti-hyperglycemic dose as follows-   Antihyperglycemics (From admission, onward)      Start     Stop Route Frequency Ordered    06/10/24 0619  insulin aspart U-100 pen 0-5 Units         -- SubQ Before meals & nightly PRN 06/10/24 0522          Hold Oral hypoglycemics while patient is in the hospital.    Cardiac pacemaker in situ  Noted hx    H/O prosthetic aortic valve replacement  Noted hx      VTE Risk Mitigation (From admission, onward)           Ordered     apixaban tablet 5 mg  2 times daily         06/10/24 0522     IP VTE HIGH RISK PATIENT  Once         06/10/24 0522      Place sequential compression device  Until discontinued         06/10/24 0522                    Discharge Planning   ELDA: 6/13/2024     Code Status: DNR   Is the patient medically ready for discharge?:     Reason for patient still in hospital (select all that apply): Imaging and Pending disposition  Discharge Plan A: Skilled Nursing Facility                  Chan Tejada MD  Department of Hospital Medicine   [facility]/Surg

## 2024-06-12 NOTE — ASSESSMENT & PLAN NOTE
Patient's FSGs are controlled on current medication regimen.  Last A1c reviewed-   Lab Results   Component Value Date    HGBA1C 6.2 03/05/2024     Most recent fingerstick glucose reviewed-   Recent Labs   Lab 06/11/24  1603 06/11/24  2041 06/12/24  0720 06/12/24  1121   POCTGLUCOSE 224* 199* 135* 165*       Current correctional scale  Low  Maintain anti-hyperglycemic dose as follows-   Antihyperglycemics (From admission, onward)    Start     Stop Route Frequency Ordered    06/10/24 0619  insulin aspart U-100 pen 0-5 Units         -- SubQ Before meals & nightly PRN 06/10/24 0522        Hold Oral hypoglycemics while patient is in the hospital.

## 2024-06-12 NOTE — PLAN OF CARE
Spoke to Leelee at [facility]. [place] accepted and has auth. Pending medical clearance. Notified ELIAN Thomas CM     06/12/24 1205   Post-Acute Status   Post-Acute Authorization Placement   Post-Acute Placement Status Pending medical clearance/testing   Discharge Plan   Discharge Plan A Skilled Nursing Facility

## 2024-06-12 NOTE — PT/OT/SLP PROGRESS
Physical Therapy Treatment    Patient Name:  Milly Lee   MRN:  [id number]    Recommendations:     Discharge Recommendations: Moderate Intensity Therapy  Discharge Equipment Recommendations: none  Barriers to discharge: None    Assessment:     Milly Lee is a 89 y.o. female admitted with a medical diagnosis of Altered mental status.  She presents with the following impairments/functional limitations: weakness, impaired endurance, impaired self care skills, impaired functional mobility, gait instability, impaired balance, decreased upper extremity function, pain, decreased ROM, impaired cardiopulmonary response to activity, orthopedic precautions . Awake, alert , supine in bed with daughter present. Presents with coughing spell.   Agreed to mobilize.  RUE sling in place , adjusted upon sitting. Sup > sit with Min A, gown donned.   Sit > stand with Min A.  Ambulated 10' into restroom with HHA, 3L O2 in tow.  Required A for removal diaper , stand > sit with CGA.  Required A for hygiene in standing post void, diaper applied.  Ambulated 10' additional, HHA to transport chair for CT.      Rehab Prognosis: Fair; patient would benefit from acute skilled PT services to address these deficits and reach maximum level of function.    Recent Surgery: * No surgery found *      Plan:     During this hospitalization, patient to be seen 6 x/week to address the identified rehab impairments via gait training, therapeutic activities, therapeutic exercises and progress toward the following goals:    Plan of Care Expires:  06/30/24    Subjective     Chief Complaint: pain RU with movement  Patient/Family Comments/goals: to go to facility   Pain/Comfort:  Pain Rating 1: other (see comments) (did not rate)  Location - Side 1: Right  Location 1: arm  Pain Addressed 1: Reposition, Nurse notified      Objective:     Communicated with nurse Rondon prior to session.  Patient found supine with bed alarm, oxygen, telemetry  upon PT entry to room.     General Precautions: Standard, fall, pacemaker  Orthopedic Precautions: RUE non weight bearing  Braces: UE Sling  Respiratory Status: Nasal cannula, flow 3 L/min     Functional Mobility:  Bed Mobility:     Supine to Sit: minimum assistance  Transfers:     Sit to Stand:  minimum assistance with hand-held assist  Gait: 10' x 2 with HHA, NWB RUE, O2 in tow.       AM-PAC 6 CLICK MOBILITY          Treatment & Education:  Ambulated to restroom with HHA.  Assistance required for hygiene , diaper on / off .  Ambulated to transport chair in hallway for CT.     Patient left up in chair with all lines intact, nurse Alcon notified, and tech present..    GOALS:   Multidisciplinary Problems       Physical Therapy Goals          Problem: Physical Therapy    Goal Priority Disciplines Outcome Goal Variances Interventions   Physical Therapy Goal     PT, PT/OT Progressing     Description: Goals to be met by: 2024     Patient will increase functional independence with mobility by performin. Supine to sit with MInimal Assistance  2. Sit to stand transfer with Minimal Assistance  3. Bed to chair transfer with Minimal Assistance using hemiwalker  4. Gait  x 60 feet with Minimal Assistance using Andi-walker.   5. Lower extremity exercise program x20 reps     R humerus fx/sling 2024 and NWB                         Time Tracking:     PT Received On: 24  PT Start Time: 0850     PT Stop Time: 0913  PT Total Time (min): 23 min     Billable Minutes: Gait Training 10min and Therapeutic Activity 13min    Treatment Type: Treatment  PT/PTA: PTA     Number of PTA visits since last PT visit: 2024

## 2024-06-12 NOTE — CARE UPDATE
06/11/24 1858   Patient Assessment/Suction   Level of Consciousness (AVPU) alert   Respiratory Effort Shallow   Expansion/Accessory Muscles/Retractions no retractions;no use of accessory muscles   All Lung Fields Breath Sounds diminished   CARLYN Breath Sounds rhonchi   LLL Breath Sounds diminished   RUL Breath Sounds diminished   RML Breath Sounds diminished   RLL Breath Sounds diminished   PRE-TX-O2   Device (Oxygen Therapy) nasal cannula with humidification   $ Is the patient on Low Flow Oxygen? Yes   Flow (L/min) (Oxygen Therapy) 3   SpO2 (!) 91 %   Pulse Oximetry Type Intermittent   $ Pulse Oximetry - Multiple Charge Pulse Oximetry - Multiple   Pulse 79   Resp 18   Aerosol Therapy   $ Aerosol Therapy Charges Aerosol Treatment   Respiratory Treatment Status (SVN) given   Treatment Route (SVN) mask;oxygen   Patient Position HOB elevated   Post Treatment Assessment (SVN) increased aeration   Signs of Intolerance (SVN) none   Breath Sounds Post-Respiratory Treatment   Throughout All Fields Post-Treatment All Fields   Throughout All Fields Post-Treatment aeration increased   Post-treatment Heart Rate (beats/min) 79   Post-treatment Resp Rate (breaths/min) 18   Vibratory PEP Therapy   $ Vibratory PEP Charges Aerobika Therapy   $ Vibratory PEP Tech Time Charges 15 min   Type (PEP Therapy) vibratory/oscillatory   Device (PEP Therapy) flutter   Route (PEP Therapy) mouthpiece   Breaths per Cycle (PEP Therapy) 8   Cycles (PEP Therapy) 1   Settings (PEP Therapy) PEP 5   Patient Position (PEP Therapy) HOB elevated   Post Treatment Assessment (PEP) increased aeration   Signs of Intolerance (PEP Therapy) none

## 2024-06-12 NOTE — SUBJECTIVE & OBJECTIVE
Interval History: Patient seen and examined. LIS. Accepted to SNF. Pending CT scan wrist to ensure if ortho recommendations are needed prior to SNF.      Objective:     Vital Signs (Most Recent):  Temp: 97.4 °F (36.3 °C) (06/12/24 1123)  Pulse: 77 (06/12/24 1123)  Resp: 17 (06/12/24 1123)  BP: (!) 113/56 (06/12/24 1123)  SpO2: (!) 94 % (06/12/24 1123) Vital Signs (24h Range):  Temp:  [97.4 °F (36.3 °C)-98.3 °F (36.8 °C)] 97.4 °F (36.3 °C)  Pulse:  [71-84] 77  Resp:  [16-18] 17  SpO2:  [91 %-95 %] 94 %  BP: ()/(53-67) 113/56     Weight: 83.7 kg (184 lb 8.4 oz)  Body mass index is 36.04 kg/m².    Intake/Output Summary (Last 24 hours) at 6/12/2024 1430  Last data filed at 6/11/2024 1847  Gross per 24 hour   Intake 240 ml   Output --   Net 240 ml         Physical Exam  Vitals reviewed.   Constitutional:       General: She is not in acute distress.     Appearance: She is obese.   HENT:      Head: Normocephalic and atraumatic.   Cardiovascular:      Rate and Rhythm: Normal rate and regular rhythm.   Pulmonary:      Effort: Pulmonary effort is normal. No respiratory distress.      Breath sounds: Normal breath sounds.   Musculoskeletal:      Comments: R arm in sling  Pain to R lateral wrist area on palpation, ROM limited by pain   Neurological:      General: No focal deficit present.      Mental Status: She is alert and oriented to person, place, and time. Mental status is at baseline.   Psychiatric:         Mood and Affect: Affect normal.         Behavior: Behavior normal.         Thought Content: Thought content normal.             Significant Labs: All pertinent labs within the past 24 hours have been reviewed.    Significant Imaging: I have reviewed all pertinent imaging results/findings within the past 24 hours.

## 2024-06-13 VITALS
DIASTOLIC BLOOD PRESSURE: 71 MMHG | BODY MASS INDEX: 36.22 KG/M2 | HEIGHT: 60 IN | RESPIRATION RATE: 18 BRPM | TEMPERATURE: 98 F | SYSTOLIC BLOOD PRESSURE: 125 MMHG | OXYGEN SATURATION: 95 % | WEIGHT: 184.5 LBS | HEART RATE: 79 BPM

## 2024-06-13 PROBLEM — R41.82 ALTERED MENTAL STATUS: Status: RESOLVED | Noted: 2024-06-10 | Resolved: 2024-06-13

## 2024-06-13 LAB
POCT GLUCOSE: 140 MG/DL (ref 70–110)
POCT GLUCOSE: 168 MG/DL (ref 70–110)
POCT GLUCOSE: 177 MG/DL (ref 70–110)

## 2024-06-13 PROCEDURE — 97116 GAIT TRAINING THERAPY: CPT | Mod: CQ

## 2024-06-13 PROCEDURE — 25000003 PHARM REV CODE 250: Performed by: STUDENT IN AN ORGANIZED HEALTH CARE EDUCATION/TRAINING PROGRAM

## 2024-06-13 PROCEDURE — 25000242 PHARM REV CODE 250 ALT 637 W/ HCPCS

## 2024-06-13 PROCEDURE — 27000221 HC OXYGEN, UP TO 24 HOURS

## 2024-06-13 PROCEDURE — 94761 N-INVAS EAR/PLS OXIMETRY MLT: CPT

## 2024-06-13 PROCEDURE — 99900035 HC TECH TIME PER 15 MIN (STAT)

## 2024-06-13 PROCEDURE — 25000003 PHARM REV CODE 250

## 2024-06-13 PROCEDURE — 94664 DEMO&/EVAL PT USE INHALER: CPT

## 2024-06-13 PROCEDURE — 94640 AIRWAY INHALATION TREATMENT: CPT

## 2024-06-13 RX ORDER — INSULIN ASPART 100 [IU]/ML
0-5 INJECTION, SOLUTION INTRAVENOUS; SUBCUTANEOUS
Start: 2024-06-13 | End: 2025-06-13

## 2024-06-13 RX ADMIN — ACETAMINOPHEN 650 MG: 325 TABLET ORAL at 11:06

## 2024-06-13 RX ADMIN — DULOXETINE HYDROCHLORIDE 30 MG: 30 CAPSULE, DELAYED RELEASE ORAL at 08:06

## 2024-06-13 RX ADMIN — ATORVASTATIN CALCIUM 80 MG: 40 TABLET, FILM COATED ORAL at 08:06

## 2024-06-13 RX ADMIN — IPRATROPIUM BROMIDE AND ALBUTEROL SULFATE 3 ML: .5; 3 SOLUTION RESPIRATORY (INHALATION) at 11:06

## 2024-06-13 RX ADMIN — DILTIAZEM HYDROCHLORIDE 120 MG: 120 CAPSULE, COATED, EXTENDED RELEASE ORAL at 08:06

## 2024-06-13 RX ADMIN — PANTOPRAZOLE SODIUM 20 MG: 20 TABLET, DELAYED RELEASE ORAL at 08:06

## 2024-06-13 RX ADMIN — FUROSEMIDE 40 MG: 40 TABLET ORAL at 08:06

## 2024-06-13 RX ADMIN — METOPROLOL SUCCINATE 100 MG: 50 TABLET, EXTENDED RELEASE ORAL at 08:06

## 2024-06-13 RX ADMIN — APIXABAN 5 MG: 2.5 TABLET, FILM COATED ORAL at 08:06

## 2024-06-13 RX ADMIN — DIGOXIN 125 MCG: 125 TABLET ORAL at 08:06

## 2024-06-13 RX ADMIN — IPRATROPIUM BROMIDE AND ALBUTEROL SULFATE 3 ML: .5; 3 SOLUTION RESPIRATORY (INHALATION) at 06:06

## 2024-06-13 RX ADMIN — LEVOTHYROXINE SODIUM 175 MCG: 0.03 TABLET ORAL at 05:06

## 2024-06-13 RX ADMIN — IPRATROPIUM BROMIDE AND ALBUTEROL SULFATE 3 ML: .5; 3 SOLUTION RESPIRATORY (INHALATION) at 04:06

## 2024-06-13 RX ADMIN — ACETAMINOPHEN 650 MG: 325 TABLET ORAL at 05:06

## 2024-06-13 RX ADMIN — Medication 1000 MCG: at 08:06

## 2024-06-13 NOTE — CARE UPDATE
06/13/24 0647   Patient Assessment/Suction   Level of Consciousness (AVPU) alert   Respiratory Effort Normal;Unlabored   Expansion/Accessory Muscles/Retractions no use of accessory muscles   All Lung Fields Breath Sounds diminished;clear   Rhythm/Pattern, Respiratory unlabored;pattern regular;depth regular   Cough Frequency no cough   PRE-TX-O2   Device (Oxygen Therapy) nasal cannula   $ Is the patient on Low Flow Oxygen? Yes   Flow (L/min) (Oxygen Therapy) 3   SpO2 96 %   Pulse Oximetry Type Intermittent   $ Pulse Oximetry - Multiple Charge Pulse Oximetry - Multiple   Pulse 75   Resp 20   Aerosol Therapy   $ Aerosol Therapy Charges Aerosol Treatment  (Pt. slept throughout resp. tx.)   Respiratory Treatment Status (SVN) given   Treatment Route (SVN) mask   Patient Position HOB elevated   Post Treatment Assessment (SVN) breath sounds unchanged   Signs of Intolerance (SVN) none   Vibratory PEP Therapy   $ Vibratory PEP Charges   (Pt. is sleeping. Unable to use PEP therapy at this time.)

## 2024-06-13 NOTE — CARE UPDATE
06/12/24 1919   Patient Assessment/Suction   Level of Consciousness (AVPU) alert   Respiratory Effort Normal;Unlabored   Expansion/Accessory Muscles/Retractions no use of accessory muscles;expansion symmetric   All Lung Fields Breath Sounds Anterior:;Lateral:   CARLYN Breath Sounds clear   LLL Breath Sounds diminished   RUL Breath Sounds clear   RML Breath Sounds diminished   RLL Breath Sounds diminished   Rhythm/Pattern, Respiratory pattern regular;unlabored   Cough Frequency infrequent   Cough Type no productive sputum   PRE-TX-O2   Device (Oxygen Therapy) nasal cannula with humidification   $ Is the patient on Low Flow Oxygen? Yes   Flow (L/min) (Oxygen Therapy) 4   SpO2 97 %   Pulse Oximetry Type Intermittent   $ Pulse Oximetry - Multiple Charge Pulse Oximetry - Multiple   Pulse 73   Resp 16   Aerosol Therapy   $ Aerosol Therapy Charges Aerosol Treatment   Daily Review of Necessity (SVN) completed   Respiratory Treatment Status (SVN) given   Treatment Route (SVN) oxygen;mask   Patient Position HOB elevated   Post Treatment Assessment (SVN) breath sounds unchanged   Signs of Intolerance (SVN) none   Breath Sounds Post-Respiratory Treatment   Throughout All Fields Post-Treatment All Fields   Throughout All Fields Post-Treatment aeration increased   Post-treatment Heart Rate (beats/min) 76   Post-treatment Resp Rate (breaths/min) 18   Vibratory PEP Therapy   $ Vibratory PEP Charges Aerobika Therapy   $ Vibratory PEP Tech Time Charges 15 min   Daily Review of Necessity (PEP Therapy) completed   Type (PEP Therapy) vibratory/oscillatory   Device (PEP Therapy) flutter   Route (PEP Therapy) mouthpiece   Breaths per Cycle (PEP Therapy) 6   Cycles (PEP Therapy) 1   Settings (PEP Therapy) PEP 5   Patient Position (PEP Therapy) HOB elevated   Post Treatment Assessment (PEP) breath sounds unchanged   Signs of Intolerance (PEP Therapy) none

## 2024-06-13 NOTE — PT/OT/SLP PROGRESS
Occupational Therapy      Patient Name:  Milly Lee   MRN:  [id number]    Attempted OT tx. Patient declined. Per patient's daughter, patient is scheduled to discharged to NH today. Will follow up tomorrow if appropriate.    6/13/2024

## 2024-06-13 NOTE — DISCHARGE SUMMARY
[facility] Medicine  Discharge Summary      Patient Name: Milly Lee  MRN: [id number]  ADRIAN: [id number]  Patient Class: IP- Inpatient  Admission Date: 6/9/2024  Hospital Length of Stay: 3 days  Discharge Date and Time:  06/13/2024 12:39 PM  Attending Physician: Chan Tejada MD   Discharging Provider: Chan Tejada MD  Primary Care Provider: Michela Christian MD    Primary Care Team: Networked reference to record PCT     HPI:   Milly Lee is a 89 year old female with a past medical history of chronic diastolic heart failure, TAVR, pacemaker, CABG, atrial fibrillation on Eliquis, CAD, diabetes mellitus, CVA, right eye blindness, hypertension, hypothyroidism, GERD, and chronic home O2 dependence at 3 L who was transferred from [facility] with altered mental status and shortness of breath for 1 day. She is status post mechanical fall 06/06/2024 with closed fracture of proximal end of right humerus with sling in place. Her daughter at bedside is a retired nurse. Daughter reports she takes Flexeril, Ultram, and ibuprofen for pain every 8 hours and has been having increased lethargy since her morning dose. By this time patient has received 5 doses total and became lethargic. Daughter also reports decreasing urinary output and p.o. intake.  Patient denies chest pain, dizziness, lightheadedness, headaches, constipation/diarrhea, abdominal pain, nausea, or vomiting. ED workup: CT head no acute intracranial abnormalities.  Chest x-ray improved perihilar infiltrates with minimal streaky opacities and recurrent mild pulmonary edema versus chronic interstitial versus atelectasis. CBC with white count 12.0, and H&H stable. BMP with hyponatremia 132 and creatinine 1.2. Bili and LFTs within normal limits. Urinalysis negative. Last echo on 03/2024 EF 60-65% with normal diastolic function. ABGs reflective of hypoxemia PO2 68 otherwise within normal limits. Oxygen saturation on room air  85%, she was placed on OxyMask in ED due to mouth breathing sats improved to 92-95%.  Upon assessment patient arouses to voice oriented to person place time and situation. Daughter reports improving mental status since admission.  Admitted to hospital medicine for further management and treatment        * No surgery found *      Hospital Course:   Admitted for lethargy and hypoxia attributed to recent use of flexeril and tramadol for her recent right humerus fracture s/p fall. Labs and imaging did not suggest other acute process such as infection. With time, her symptoms resolved. PT/OT recommended moderate continued therapy and we looked in to SNF. Complained of pain to right wrist with xray questioning scaphoid fracture. CT wrist without obvious fracture. Accepted and transferred to SNF. By time of discharge the patient was tolerating a regular diet with resolved admission symptoms. Patient seen and examined on day of discharge.    Physical exam on day of discharge:  Constitutional:       General: She is not in acute distress.     Appearance: She is obese.   HENT:      Head: Normocephalic and atraumatic.   Cardiovascular:      Rate and Rhythm: Normal rate and regular rhythm.   Pulmonary:      Effort: Pulmonary effort is normal. No respiratory distress.      Breath sounds: Normal breath sounds.   Musculoskeletal:      Comments: R arm in sling  Pain to R lateral wrist area on palpation, ROM limited by pain   Neurological:      General: No focal deficit present.      Mental Status: She is alert and oriented to person, place, and time. Mental status is at baseline.   Psychiatric:         Mood and Affect: Affect normal.         Behavior: Behavior normal.         Thought Content: Thought content normal.        Goals of Care Treatment Preferences:  Code Status: DNR      Consults:     No new Assessment & Plan notes have been filed under this hospital service since the last note was generated.  Service: [facility]  Medicine    Final Active Diagnoses:    Diagnosis Date Noted POA    Closed fracture of head of right humerus [S42.291A] 06/11/2024 Yes    Right wrist pain [M25.531] 06/11/2024 Yes    Chronic respiratory failure with hypoxia [J96.11] 06/11/2024 Yes    CAD (coronary artery disease) [I25.10]  Yes    Type 2 diabetes mellitus with stage 3 chronic kidney disease, with long-term current use of insulin [E11.22, N18.30, Z79.4] 02/21/2018 Not Applicable    Cardiac pacemaker in situ [Z95.0] 02/21/2018 Yes    H/O prosthetic aortic valve replacement [Z95.2] 02/21/2018 Not Applicable      Problems Resolved During this Admission:    Diagnosis Date Noted Date Resolved POA    PRINCIPAL PROBLEM:  Altered mental status [R41.82] 06/10/2024 06/13/2024 Yes    Acute hypoxemic respiratory failure [J96.01] 03/02/2024 06/11/2024 Yes       Discharged Condition: good    Disposition: Skilled Nursing Facility    Follow Up:   Follow-up Information       SNF provider Follow up.               Orthopedic. Schedule an appointment as soon as possible for a visit in 2 week(s).                           Patient Instructions:      Ambulatory referral/consult to Orthopedics   Standing Status: Future   Referral Priority: Routine Referral Type: Consultation   Requested Specialty: Orthopedic Surgery   Number of Visits Requested: 1     Diet Cardiac     Diet diabetic     Notify your health care provider if you experience any of the following:  temperature >100.4     Notify your health care provider if you experience any of the following:  persistent nausea and vomiting or diarrhea     Notify your health care provider if you experience any of the following:  severe uncontrolled pain     Notify your health care provider if you experience any of the following:  redness, tenderness, or signs of infection (pain, swelling, redness, odor or green/yellow discharge around incision site)     Notify your health care provider if you experience any of the following:   difficulty breathing or increased cough     Notify your health care provider if you experience any of the following:  severe persistent headache     Notify your health care provider if you experience any of the following:  worsening rash     Notify your health care provider if you experience any of the following:  persistent dizziness, light-headedness, or visual disturbances     Notify your health care provider if you experience any of the following:  increased confusion or weakness     Weight bearing restrictions (specify):   Order Comments: Non weight bearing right arm       Significant Diagnostic Studies:     Lab Results   Component Value Date    WBC 6.94 06/11/2024    HGB 9.7 (L) 06/11/2024    HCT 28.7 (L) 06/11/2024    MCV 92 06/11/2024     (L) 06/11/2024       BMP  Lab Results   Component Value Date     (L) 06/10/2024    K 4.1 06/10/2024    CL 97 06/10/2024    CO2 27 06/10/2024    BUN 18 06/10/2024    CREATININE 1.2 06/10/2024    CALCIUM 9.3 06/10/2024    ANIONGAP 9 06/10/2024    EGFRNORACEVR 44 (A) 06/10/2024       CT Wrist Without Contrast Right    Result Date: 6/12/2024  EXAMINATION: CT WRIST WITHOUT CONTRAST RIGHT CLINICAL HISTORY: Wrist pain, occult fracture suspected, nondiagnostic xray; TECHNIQUE: Noncontrast transaxial CT images right wrist.  Multiplanar reformats. COMPARISON: 06/11/2024 FINDINGS: Exam is severely limited due to number factors including advanced osteopenia, difficulty with patient positioning and respiratory motion. The 1st phase of imaging was nondiagnostic.  The 2nd phase of imaging reveal no significantly displaced fracture although nondisplaced fracture cannot be excluded.  There is no traumatic malalignment.  There is chondrocalcinosis.  There is between mild-to-moderate carpal degenerative change at multiple sites including triscaphe and thumb CMC joint.     Markedly limited exam, only sufficient to exclude a displaced fracture. Electronically signed by: Fazal  Hanks Date:    06/12/2024 Time:    15:53    X-Ray Wrist Complete Right    Result Date: 6/11/2024  EXAMINATION: XR WRIST COMPLETE 3 VIEWS RIGHT CLINICAL HISTORY: fall, pain; TECHNIQUE: PA, lateral, and oblique views of the right wrist were performed. COMPARISON: None FINDINGS: A thin sclerotic line is seen through the body of the scaphoid and a a nondisplaced fracture is not ruled out.  Soft tissue swelling is noted dorsally.  Degenerative changes are noted at the radiocarpal joint with calcification of the triangular fibrocartilage.  DJD is also noted at the 1st carpal metacarpal joint.     A sclerotic line in the body of the scaphoid raises the possibility of a  nondisplaced fracture.  CT of the wrist is recommended.  Degenerative changes are noted at the radiocarpal joint, triangular fibrocartilage and 1st carpal metacarpal joint. Electronically signed by: Caden Meza MD Date:    06/11/2024 Time:    13:05    X-Ray Hand 3 View Right    Result Date: 6/11/2024  EXAMINATION: XR HAND COMPLETE 3 VIEW RIGHT CLINICAL HISTORY: fall, pain; TECHNIQUE: PA, lateral, and oblique views of the right hand were performed. COMPARISON: None FINDINGS: On the studies an acute fracture is not identified.  Subluxation or dislocation of the carpals is noted.  There is soft tissue swelling at the wrist however.  Degenerative changes are noted at the 1st carpal metacarpal joint and at the 1st and 2nd metacarpophalangeal joints.  Additional degenerative change with soft tissue calcification is seen at the PIP joint of the index finger.  Juxta-articular bone erosion or Osteoporosis is not seen.     Soft tissue swelling at the wrist without subluxation or dislocation of the carpals.  Osteoarthritic changes at the 1st carpal metacarpal joint and 1st and 2nd metacarpophalangeal joints as well as the PIP joint of the index finger. Electronically signed by: Caden Meza MD Date:    06/11/2024 Time:    13:03    CT Head Without  Contrast    Result Date: 6/9/2024  EXAMINATION: CT HEAD WITHOUT CONTRAST CLINICAL HISTORY: Mental status change, unknown cause; TECHNIQUE: Low dose axial images were obtained through the head.  Coronal and sagittal reformations were also performed. Contrast was not administered. COMPARISON: 03/26/2019. FINDINGS: The brain parenchyma appears normal for age with good corticomedullary differentiation.  There is no evidence of acute infarct, hemorrhage, or mass.  There is ventricular and sulcal enlargement consistent with generalized atrophy.  Mild patchy decreased supratentorial white matter attenuation most likely related to chronic nonspecific small vessel disease.   No mass-effect or midline shift.  There are no abnormal extra-axial fluid collections. The paranasal sinuses and mastoid air cells are essentially clear . The calvarium appears intact.  .     No acute intracranial abnormalities. Chronic findings, as above, similar compared to prior. Electronically signed by: Baudilio Gustafson MD Date:    06/09/2024 Time:    23:36    X-Ray Chest AP Portable    Result Date: 6/9/2024  EXAMINATION: XR CHEST AP PORTABLE CLINICAL HISTORY: altered mental status; TECHNIQUE: Single frontal view of the chest was performed. COMPARISON: None FINDINGS: Cardiac silhouette appears stable with TAVR valve.  Twin lead pacemaker in position.  The hazy opacities in perihilar regions have resolved with minimal streaky opacities in the lung bases..  Degenerative changes in the spine and shoulders.     There is improved perihilar infiltrates with minimal streaky opacities in both lung bases.  Correlate for residual or recurrent mild pulmonary edema versus chronic interstitial or atelectatic changes. Electronically signed by: Caden Kapoor Date:    06/09/2024 Time:    22:54           Pending Diagnostic Studies:       Procedure Component Value Units Date/Time    EKG 12-lead [[id number]]     Order Status: Sent Lab Status: No result             Medications:  Reconciled Home Medications:      Medication List        START taking these medications      insulin aspart U-100 100 unit/mL (3 mL) Inpn pen  Commonly known as: NovoLOG  Inject 0-5 Units into the skin before meals and at bedtime as needed (Hyperglycemia).            CONTINUE taking these medications      AEROCHAMBER PLUS FLOW-VU  Generic drug: inhalation spacing device     apixaban 5 mg Tab  Commonly known as: ELIQUIS  Take 1 tablet (5 mg total) by mouth 2 (two) times daily.     ascorbic acid (vitamin C) 500 mg Cap  Take 500 mg by mouth once daily.     BAQSIMI 3 mg/actuation Spry  Generic drug: glucagon  1 Dose by Each Nostril route daily as needed.     calcium carbonate-vitamin D3 600 mg-5 mcg (200 unit) Cap  Take 1 capsule by mouth once daily.     CONTOUR TEST STRIPS Strp  Generic drug: blood sugar diagnostic  1 strip by Misc.(Non-Drug; Combo Route) route 2 (two) times a day.     cyanocobalamin 500 MCG tablet  Take 1,000 mcg by mouth once daily.     diclofenac sodium 1 % Gel  Commonly known as: VOLTAREN  Apply 4 g topically 2 (two) times daily.     digoxin 125 mcg tablet  Commonly known as: LANOXIN  Take 1 tablet (125 mcg total) by mouth once daily.     diltiaZEM 120 MG Cp24  Commonly known as: CARDIZEM CD  Take 1 capsule (120 mg total) by mouth once daily.     DULoxetine 30 MG capsule  Commonly known as: CYMBALTA  Take 30 mg by mouth once daily.     fluticasone propionate 50 mcg/actuation nasal spray  Commonly known as: FLONASE  1 spray by Each Nostril route once daily.     furosemide 40 MG tablet  Commonly known as: LASIX  Take 40 mg by mouth once daily.     glucose 4 GM chewable tablet  Take 16 g by mouth as needed for Low blood sugar.     hydrocortisone 2.5 % cream  Apply 1 application  topically as needed.     levothyroxine 175 MCG tablet  Commonly known as: SYNTHROID, LEVOTHROID  Take 175 mcg by mouth once daily.     magnesium oxide 250 mg magnesium Tab  Commonly known as: MAG-OX  Take 400  "mg by mouth once daily.     metoprolol succinate 100 MG 24 hr tablet  Commonly known as: TOPROL-XL  Take 1 tablet (100 mg total) by mouth once daily.     nitroGLYCERIN 0.4 MG SL tablet  Commonly known as: NITROSTAT  Place 0.4 mg under the tongue every 5 (five) minutes as needed for Chest pain.     omega-3 fatty acids-fish oil 340-1,000 mg Cap  Take 1 capsule by mouth once daily.     ondansetron 4 MG Tbdl  Commonly known as: ZOFRAN-ODT  Take 4 mg by mouth every 6 (six) hours as needed.     pantoprazole 20 MG tablet  Commonly known as: PROTONIX  Take 20 mg by mouth once daily.     pyridoxine (vitamin B6) 100 MG Tab  Commonly known as: B-6  Take 1 tablet by mouth once daily.     rosuvastatin 20 MG tablet  Commonly known as: CRESTOR  Take 20 mg by mouth once daily.     TRUE METRIX GLUCOSE METER Misc  Generic drug: blood-glucose meter     * TRUEPLUS LANCETS 28 gauge Misc  Generic drug: lancets  1 lancet by Misc.(Non-Drug; Combo Route) route 2 (two) times a day.     * TRUEPLUS LANCETS 33 gauge Misc  Generic drug: lancets  1 lancet by Misc.(Non-Drug; Combo Route) route 2 (two) times a day.     TRUEPLUS PEN NEEDLE 31 gauge x 1/4" Ndle  Generic drug: pen needle, diabetic  1 pen by Misc.(Non-Drug; Combo Route) route 2 (two) times a day.     VENTOLIN HFA 90 mcg/actuation inhaler  Generic drug: albuterol  Inhale 1 puff into the lungs every 4 (four) hours as needed for Wheezing.           * This list has 2 medication(s) that are the same as other medications prescribed for you. Read the directions carefully, and ask your doctor or other care provider to review them with you.                STOP taking these medications      cyclobenzaprine 10 MG tablet  Commonly known as: FLEXERIL     NovoLOG Mix 70-30FlexPen U-100 100 unit/mL (70-30) Inpn pen  Generic drug: insulin aspart protamine-insulin aspart     nystatin cream  Commonly known as: MYCOSTATIN     potassium gluconate 2.5 mEq Tab     tobramycin-dexAMETHasone 0.3-0.1% 0.3-0.1 % " Drps  Commonly known as: TOBRADEX     traMADoL 50 mg tablet  Commonly known as: ULTRAM     triamcinolone acetonide 0.1% 0.1 % cream  Commonly known as: KENALOG              Indwelling Lines/Drains at time of discharge:   Lines/Drains/Airways       None                   Time spent on the discharge of patient: 35 minutes         Chan Tejada MD  Department of Hospital Medicine  [facility]/Surg

## 2024-06-13 NOTE — PLAN OF CARE
Problem: Adult Inpatient Plan of Care  Goal: Plan of Care Review  Outcome: Progressing  Goal: Patient-Specific Goal (Individualized)  Outcome: Progressing  Goal: Absence of Hospital-Acquired Illness or Injury  Outcome: Progressing  Goal: Optimal Comfort and Wellbeing  Outcome: Progressing  Goal: Readiness for Transition of Care  Outcome: Progressing     Problem: Diabetes Comorbidity  Goal: Blood Glucose Level Within Targeted Range  Outcome: Progressing     Problem: Wound  Goal: Optimal Coping  Outcome: Progressing  Goal: Optimal Functional Ability  Outcome: Progressing  Goal: Absence of Infection Signs and Symptoms  Outcome: Progressing  Goal: Improved Oral Intake  Outcome: Progressing  Goal: Optimal Pain Control and Function  Outcome: Progressing  Goal: Skin Health and Integrity  Outcome: Progressing  Goal: Optimal Wound Healing  Outcome: Progressing     Problem: Fall Injury Risk  Goal: Absence of Fall and Fall-Related Injury  Outcome: Progressing     Problem: Skin Injury Risk Increased  Goal: Skin Health and Integrity  Outcome: Progressing

## 2024-06-13 NOTE — PLAN OF CARE
Problem: Adult Inpatient Plan of Care  Goal: Plan of Care Review  Outcome: Progressing  Goal: Patient-Specific Goal (Individualized)  Outcome: Progressing  Goal: Optimal Comfort and Wellbeing  Outcome: Progressing     Problem: Diabetes Comorbidity  Goal: Blood Glucose Level Within Targeted Range  Outcome: Progressing     Problem: Wound  Goal: Optimal Coping  Outcome: Progressing  Goal: Absence of Infection Signs and Symptoms  Outcome: Progressing  Goal: Optimal Pain Control and Function  Outcome: Progressing  Goal: Skin Health and Integrity  Outcome: Progressing

## 2024-06-13 NOTE — DISCHARGE INSTRUCTIONS
[facility]/Surg  Facility Transfer Orders        Admit to: SNF    Diagnoses:   Active Hospital Problems    Diagnosis  POA    *Altered mental status [R41.82]  Yes    Closed fracture of head of right humerus [S42.291A]  Yes    Right wrist pain [M25.531]  Yes    Chronic respiratory failure with hypoxia [J96.11]  Yes    CAD (coronary artery disease) [I25.10]  Yes     S/p CABG X 1.       Type 2 diabetes mellitus with stage 3 chronic kidney disease, with long-term current use of insulin [E11.22, N18.30, Z79.4]  Not Applicable    Cardiac pacemaker in situ [Z95.0]  Yes     Biotronik device.       H/O prosthetic aortic valve replacement [Z95.2]  Not Applicable      Resolved Hospital Problems    Diagnosis Date Resolved POA    Acute hypoxemic respiratory failure [J96.01] 06/11/2024 Yes     Allergies:   Review of patient's allergies indicates:   Allergen Reactions    Morphine Anxiety       Code Status: DNR    Vitals: Routine       Diet: cardiac diet and diabetic diet: 2000 calorie    Activity: Weight bearing status: non weight bearing: right arm    Nursing Precautions: Fall    Bed/Surface: routine    Consults: PT to evaluate and treat- and OT to evaluate and treat-      Oxygen: 3 liters/min via nasal cannula    Dialysis: Patient is not on dialysis.     Labs:  n/a                 Pending Diagnostic Studies:       Procedure Component Value Units Date/Time    EKG 12-lead [[id number]]     Order Status: Sent Lab Status: No result           Imaging: n/a    Miscellaneous Care:   Right arm sling for comfort  Diabetes Care: Diabetes: Check blood sugar. Fingerstick blood sugar AC and HS  Sliding Scale/Hypoglycemia Protocol: as below for insulin aspart    IV Access: n/a     Medications: Discontinue all previous medication orders, if any. See new list below.  Current Discharge Medication List        START taking these medications    Details   insulin aspart U-100 (NOVOLOG) 100 unit/mL (3 mL) InPn pen Inject 0-5 Units into  the skin before meals and at bedtime as needed (Hyperglycemia).    Comments: Glucose      Pre-meal             2200  151-200         0 unit                0 unit  201-250         2 units              1 unit  251-300         3 units              1 unit  301-350         4 units              2 units  >350              5 units              3 units           CONTINUE these medications which have NOT CHANGED    Details   apixaban (ELIQUIS) 5 mg Tab Take 1 tablet (5 mg total) by mouth 2 (two) times daily.  Qty: 180 tablet, Refills: 3      ascorbic acid, vitamin C, 500 mg Cap Take 500 mg by mouth once daily.      calcium carbonate-vitamin D3 600 mg-5 mcg (200 unit) Cap Take 1 capsule by mouth once daily.      cyanocobalamin 500 MCG tablet Take 1,000 mcg by mouth once daily.      diclofenac sodium (VOLTAREN) 1 % Gel Apply 4 g topically 2 (two) times daily.      digoxin (LANOXIN) 125 mcg tablet Take 1 tablet (125 mcg total) by mouth once daily.  Qty: 90 tablet, Refills: 3      diltiaZEM (CARDIZEM CD) 120 MG Cp24 Take 1 capsule (120 mg total) by mouth once daily.  Qty: 90 capsule, Refills: 1    Comments: .      DULoxetine (CYMBALTA) 30 MG capsule Take 30 mg by mouth once daily.       fluticasone propionate (FLONASE) 50 mcg/actuation nasal spray 1 spray by Each Nostril route once daily.      furosemide (LASIX) 40 MG tablet Take 40 mg by mouth once daily.       hydrocortisone 2.5 % cream Apply 1 application  topically as needed.      levothyroxine (SYNTHROID, LEVOTHROID) 175 MCG tablet Take 175 mcg by mouth once daily.       magnesium oxide (MAG-OX) 250 mg Tab Take 400 mg by mouth once daily.       metoprolol succinate (TOPROL-XL) 100 MG 24 hr tablet Take 1 tablet (100 mg total) by mouth once daily.      pantoprazole (PROTONIX) 20 MG tablet Take 20 mg by mouth once daily.       pyridoxine, vitamin B6, (B-6) 100 MG Tab Take 1 tablet by mouth once daily.      rosuvastatin (CRESTOR) 20 MG tablet Take 20 mg by mouth once daily.      "  AEROCHAMBER PLUS FLOW-VU       albuterol (VENTOLIN HFA) 90 mcg/actuation inhaler Inhale 1 puff into the lungs every 4 (four) hours as needed for Wheezing.       BAQSIMI 3 mg/actuation Spry 1 Dose by Each Nostril route daily as needed.      CONTOUR TEST STRIPS Strp 1 strip by Misc.(Non-Drug; Combo Route) route 2 (two) times a day.   Refills: 1      glucose 4 GM chewable tablet Take 16 g by mouth as needed for Low blood sugar.      nitroGLYCERIN (NITROSTAT) 0.4 MG SL tablet Place 0.4 mg under the tongue every 5 (five) minutes as needed for Chest pain.      omega-3 fatty acids-fish oil 340-1,000 mg Cap Take 1 capsule by mouth once daily.  Qty: 90 capsule, Refills: 3      ondansetron (ZOFRAN-ODT) 4 MG TbDL Take 4 mg by mouth every 6 (six) hours as needed.      TRUE METRIX GLUCOSE METER Misc       !! TRUEPLUS LANCETS 28 gauge Misc 1 lancet by Misc.(Non-Drug; Combo Route) route 2 (two) times a day.       !! TRUEPLUS LANCETS 33 gauge Misc 1 lancet by Misc.(Non-Drug; Combo Route) route 2 (two) times a day.       TRUEPLUS PEN NEEDLE 31 gauge x 1/4" Ndle 1 pen by Misc.(Non-Drug; Combo Route) route 2 (two) times a day.        !! - Potential duplicate medications found. Please discuss with provider.        STOP taking these medications       cyclobenzaprine (FLEXERIL) 10 MG tablet Comments:   Reason for Stopping:         NOVOLOG MIX 70-30 FLEXPEN 100 unit/mL (70-30) InPn pen Comments:   Reason for Stopping:         potassium gluconate 2.5 mEq Tab Comments:   Reason for Stopping:         tobramycin-dexAMETHasone 0.3-0.1% (TOBRADEX) 0.3-0.1 % DrpS Comments:   Reason for Stopping:         traMADoL (ULTRAM) 50 mg tablet Comments:   Reason for Stopping:         nystatin (MYCOSTATIN) cream Comments:   Reason for Stopping:         triamcinolone acetonide 0.1% (KENALOG) 0.1 % cream Comments:   Reason for Stopping:             Follow up:    Follow-up Information       SNF provider Follow up.               Orthopedic. Schedule an " appointment as soon as possible for a visit in 2 week(s).                               Immunizations Administered as of 6/13/2024       Name Date Dose VIS Date Route Exp Date    COVID-19, MRNA, LN-S, PF (Moderna) 11/8/2021 -- -- -- --    COVID-19, MRNA, LN-S, PF (Moderna) 4/29/2021 0.5 mL -- -- --    Lot: [id number]     External: Auto Reconciled From Outside Source     COVID-19, MRNA, LN-S, PF (Moderna) 3/30/2021 0.5 mL -- -- --    Lot: [id number]     External: Auto Reconciled From Outside Source                  Chan Tejada MD

## 2024-06-13 NOTE — PLAN OF CARE
Sent AVS/orders, Ppd, tb ss form, chest xray, and 142&passr to [facility] via [employer]. Called [facility] to inform them that Pt is ready for dc today. Leelee at [facility] unavailable. Spoke with William and sent message. [facility] nurse is reviewing orders and will call back.      06/13/24 9559   Post-Acute Status   Post-Acute Authorization Placement   Discharge Plan   Discharge Plan A Skilled Nursing Facility   Discharge Plan B Skilled Nursing Facility

## 2024-06-13 NOTE — PLAN OF CARE
CHARLY called [facility]. [facility]ly informed CHARLY to call report to [facility] at [phone]. Let [facility] know that transport is scheduled for 1230. Nurse notified.     Called Pt daughter to inform her of  time. Pt is cleared for dc by [facility].       06/13/24 2162   Final Note   Assessment Type Final Discharge Note   Anticipated Discharge Disposition SNF   Post-Acute Status   Post-Acute Authorization Placement

## 2024-06-13 NOTE — PT/OT/SLP PROGRESS
Physical Therapy Treatment    Patient Name:  Milly Lee   MRN:  [id number]    Recommendations:     Discharge Recommendations: Moderate Intensity Therapy  Discharge Equipment Recommendations: none  Barriers to discharge: None    Assessment:     Milly Lee is a 89 y.o. female admitted with a medical diagnosis of Altered mental status.  She presents with the following impairments/functional limitations: weakness, impaired endurance, impaired self care skills, impaired functional mobility, gait instability, impaired balance, decreased lower extremity function, pain, decreased ROM, impaired cardiopulmonary response to activity, orthopedic precautions . Seated in chair at bedside finished breakfast.  Agreed to participate in therapy.  Reported discomfort ( spasms) RUE.  Sit > stand with Min A. Ambulated 40' with HHA , O2 in tow.  Slightly unsteady initially using LUE for balance.  Sat up in recliner at bedside.     Rehab Prognosis: Fair; patient would benefit from acute skilled PT services to address these deficits and reach maximum level of function.    Recent Surgery: * No surgery found *      Plan:     During this hospitalization, patient to be seen 6 x/week to address the identified rehab impairments via gait training, therapeutic activities, therapeutic exercises and progress toward the following goals:    Plan of Care Expires:  06/30/24    Subjective     Chief Complaint: pain RUE   Patient/Family Comments/goals: none stated  Pain/Comfort:  Pain Rating 1: other (see comments) (did not rate)  Location - Side 1: Right  Location - Orientation 1: upper  Location 1: arm (R thumb)  Pain Addressed 1: Reposition, Nurse notified      Objective:     Communicated with nurse Kennedy prior to session.  Patient found up in chair with oxygen, telemetry upon PT entry to room.     General Precautions: Standard, fall, pacemaker  Orthopedic Precautions: RUE non weight bearing  Braces: UE Sling  Respiratory Status:  Nasal cannula, flow 4 L/min     Functional Mobility:  Transfers:     Sit to Stand:  minimum assistance with hand-held assist  Gait: 40' with HHA , O2 in tow.       AM-PAC 6 CLICK MOBILITY          Treatment & Education:  Ambulated slowly with HHA for safety, RUE NWB, in sling.     Patient left up in chair with all lines intact, call button in reach, chair alarm on, and nurse Marcia notified..    GOALS:   Multidisciplinary Problems       Physical Therapy Goals          Problem: Physical Therapy    Goal Priority Disciplines Outcome Goal Variances Interventions   Physical Therapy Goal     PT, PT/OT Progressing     Description: Goals to be met by: 2024     Patient will increase functional independence with mobility by performin. Supine to sit with MInimal Assistance  2. Sit to stand transfer with Minimal Assistance  3. Bed to chair transfer with Minimal Assistance using hemiwalker  4. Gait  x 60 feet with Minimal Assistance using Andi-walker.   5. Lower extremity exercise program x20 reps     R humerus fx/sling 2024 and NWB                         Time Tracking:     PT Received On: 24  PT Start Time: 09     PT Stop Time: 912  PT Total Time (min): 12 min     Billable Minutes: Gait Training 12min    Treatment Type: Treatment  PT/PTA: PTA     Number of PTA visits since last PT visit: 2     2024

## 2024-06-19 LAB
OHS QRS DURATION: 174 MS
OHS QTC CALCULATION: 484 MS

## 2024-06-19 NOTE — PLAN OF CARE
Through communication with [facility], the Inpatient order is being changed to observation as the payer will not authorize Inpatient and the facility agrees not to appeal or challenge the change in status. The account will be changed to Observation for billing purposes.       Corinne Monique MD  Physician Advisor

## 2024-06-22 NOTE — PHYSICIAN QUERY
Please further clarify the nature/etiology of the patient's altered mental status/encephalopathy:  Toxic Encephalopathy

## 2024-09-03 NOTE — DISCHARGE INSTRUCTIONS
Discharge Instructions, [facility]    Thank you for choosing [facility] for your medical care. The primary doctor who is taking care of you at the time of your discharge is Leobardo Diaz MD.     You were admitted to the hospital with Chest pain.     Please note your discharge instructions, including diet/activity restrictions, follow-up appointments, and medication changes.  If you have any questions about your medical issues, prescriptions, or any other questions, please feel free to contact the [facility] Medicine Dept at [phone] and we will help.    If you are previously with Home health, outpatient PT/OT or under a therapy program, you are cleared to return to those programs.    Please direct all long term medication refills and follow up to your primary care provider, Michela Christian MD. Thank you again for letting us take care of your health care needs.    Please note the following discharge instructions per your discharging physician-  Follow up with cardiologist in a week     Wound Care After a Biopsy    What is a skin biopsy?  A skin biopsy allows the doctor to examine a very small piece of tissue under the microscope to determine the diagnosis and the best treatment for the skin condition. A local anesthetic (numbing medicine) is injected with a very small needle into the skin area to be tested. A small piece of skin is taken from the area. Sometimes a suture (stitch) is used.     What are the risks of a skin biopsy?  I will experience scar, bleeding, swelling, pain, crusting and redness. I may experience incomplete removal or recurrence. Risks of this procedure are excessive bleeding, bruising, infection, nerve damage, numbness, thick (hypertrophic or keloidal) scar and non-diagnostic biopsy.    How should I care for my wound for the first 24 hours?  Keep the wound dry and covered for 24 hours  If it bleeds, hold direct pressure on the area for 15 minutes. If bleeding does not stop, call us or go to the emergency room  Avoid strenuous exercise the first 1-2 days or as your doctor instructs you    How should I care for the wound after 24 hours?  After 24 hours, remove the bandage  You may bathe or shower as normal  If you had a scalp biopsy, you can shampoo as usual and can use shower water to clean the biopsy site daily  Clean the wound once a day with gentle soap and water  Do not scrub, be gentle  Apply white petroleum/Vaseline after cleaning the wound with a cotton swab or a clean finger, and keep the site covered with a Bandaid /bandage. Bandages are not necessary with a scalp biopsy  If you are unable to cover the site with a Bandaid /bandage, re-apply ointment 2-3 times a day to keep the site moist. Moisture will help with healing  Avoid strenuous activity for first 1-2 days  Avoid lakes, rivers, pools, and oceans until the stitches are removed or the site is healed    How do I clean my wound?  Wash hands thoroughly with soap or use hand  before all wound care  Clean  the wound with gentle soap and water  Apply white petroleum/Vaseline  to wound after it is clean  Replace the Bandaid /bandage to keep the wound covered for the first few days or as instructed by your doctor  If you had a scalp biopsy, warm shower water to the area on a daily basis should suffice    What should I use to clean my wound?   Cotton-tipped applicators (Qtips )  White petroleum jelly (Vaseline ). Use a clean new container and use Q-tips to apply.  Bandaids  as needed  Gentle soap     How should I care for my wound long term?  Do not get your wound dirty  Keep up with wound care for one week or until the area is healed.  If you have stitches, stitches need to be removed in 14 days. You may return to our clinic for this or you may have it done locally at your doctor s office.  A small scab will form and fall off by itself when the area is completely healed. The area will be red and will become pink in color as it heals. Sun protection is very important for how your scar will turn out. Sunscreen with an SPF 30 or greater is recommended once the area is healed.  You should have some soreness but it should be mild and slowly go away over several days. Talk to your doctor about using tylenol for pain,    When should I call my doctor?  If you have increased:   Pain or swelling  Pus or drainage (clear or slightly yellow drainage is ok)  Temperature over 100F  Spreading redness or warmth around wound    When will I hear about my results?  The biopsy results can take 2 weeks to come back.  Your results will automatically release to [employer] before your provider has even reviewed them.  The clinic will call you with the results, send you a [employer] message, or have you schedule a follow-up clinic or phone time to discuss the results.  Contact our clinics if you do not hear from us in 2 weeks.    Who should I call with questions?  [facility]: [phone]  [facility]  [facility]: [phone]  For urgent needs outside of business hours call the [facility] at [phone] and ask for the dermatology resident on call

## 2024-09-16 PROBLEM — J96.11 CHRONIC RESPIRATORY FAILURE WITH HYPOXIA: Status: RESOLVED | Noted: 2024-06-11 | Resolved: 2024-09-16

## 2024-10-15 ENCOUNTER — OFFICE VISIT (OUTPATIENT)
Dept: PODIATRY | Facility: CLINIC | Age: 89
End: 2024-10-15
Payer: MEDICARE

## 2024-10-15 VITALS — HEIGHT: 60 IN | BODY MASS INDEX: 36.04 KG/M2

## 2024-10-15 DIAGNOSIS — E11.51 TYPE II DIABETES MELLITUS WITH PERIPHERAL CIRCULATORY DISORDER: ICD-10-CM

## 2024-10-15 DIAGNOSIS — I73.9 PERIPHERAL VASCULAR DISEASE: ICD-10-CM

## 2024-10-15 DIAGNOSIS — L60.9 DISEASE OF NAIL: ICD-10-CM

## 2024-10-15 DIAGNOSIS — E11.42 DIABETIC POLYNEUROPATHY ASSOCIATED WITH TYPE 2 DIABETES MELLITUS: Primary | ICD-10-CM

## 2024-10-15 PROCEDURE — 99499 UNLISTED E&M SERVICE: CPT | Mod: S$GLB,,, | Performed by: PODIATRIST

## 2024-10-15 PROCEDURE — 11721 DEBRIDE NAIL 6 OR MORE: CPT | Mod: Q9,S$GLB,, | Performed by: PODIATRIST

## 2024-10-15 NOTE — PROGRESS NOTES
Subjective:     Patient ID: Milly Lee is a 89 y.o. female    Chief Complaint: Nail Care       Milly is a 89 y.o. female who presents to the clinic for evaluation and treatment of high risk feet. Milly has a past medical history of Arthritis, Coronary artery disease, Diabetes mellitus, type 2, Diverticulitis, GERD (gastroesophageal reflux disease), Hyperlipidemia, and Hypertension. The patient's chief complaint is long, thick toenails. This patient has documented high risk feet requiring routine maintenance secondary to diabetes mellitis and those secondary complications of diabetes, as mentioned..    PCP: Michela Christian MD    Date Last Seen by PCP: 10/10/2024    Current shoe gear:  Affected Foot: Tennis shoes     Unaffected Foot: Tennis shoes    Hemoglobin A1C   Date Value Ref Range Status   03/05/2024 6.2 4.5 - 6.2 % Final     Comment:     According to ADA guidelines, hemoglobin A1C <7.0% represents  optimal control in non-pregnant diabetic patients.  Different  metrics may apply to specific populations.   Standards of Medical Care in Diabetes - 2016.    For the purpose of screening for the presence of diabetes:  <5.7%     Consistent with the absence of diabetes  5.7-6.4%  Consistent with increasing risk for diabetes   (prediabetes)  >or=6.5%  Consistent with diabetes    Currently no consensus exists for use of hemoglobin A1C  for diagnosis of diabetes for children.         Review of Systems   Constitutional: Negative.  Negative for chills and fever.   Respiratory:  Negative for cough and shortness of breath.    Cardiovascular:  Positive for leg swelling. Negative for chest pain.   Gastrointestinal:  Negative for diarrhea, nausea and vomiting.   Neurological:  Positive for tingling.        Objective:   Physical Exam  Vitals reviewed.   Constitutional:       General: She is not in acute distress.     Appearance: Normal appearance. She is not ill-appearing.   HENT:      Head: Normocephalic.       Nose: Nose normal.   Cardiovascular:      Pulses:           Dorsalis pedis pulses are 1+ on the right side and 1+ on the left side.        Posterior tibial pulses are 1+ on the right side and 1+ on the left side.   Pulmonary:      Effort: Pulmonary effort is normal. No respiratory distress.   Skin:     Capillary Refill: Capillary refill takes 2 to 3 seconds.   Neurological:      Mental Status: She is alert and oriented to person, place, and time.   Psychiatric:         Mood and Affect: Mood normal.         Behavior: Behavior normal.         Thought Content: Thought content normal.         Judgment: Judgment normal.        Foot Exam    General  General Appearance: appears stated age and healthy   Orientation: alert and oriented to person, place, and time   Affect: appropriate   Assistance: walker use and wheelchair use       Right Foot/Ankle     Inspection and Palpation  Swelling: dorsum   Skin Exam: skin changes and abnormal color;     Neurovascular  Dorsalis pedis: 1+  Posterior tibial: 1+    Muscle Strength  Ankle dorsiflexion: 4-  Ankle plantar flexion: 4-  Ankle inversion: 4-  Ankle eversion: 4-      Left Foot/Ankle      Inspection and Palpation  Swelling: dorsum   Skin Exam: skin changes and abnormal color;     Neurovascular  Dorsalis pedis: 1+  Posterior tibial: 1+    Muscle Strength  Ankle dorsiflexion: 4-  Ankle plantar flexion: 4-  Ankle inversion: 4-  Ankle eversion: 4-      Vascular: +1 nonpitting edema noted bilateral ankle, pedal hair growth is absent bilateral lower extremity, soft tissue atrophic skin changes noted, skin temperature gradient warm to cool from proximal distal bilateral lower extremity   Dermatologic:  Thickened and discolored nail plates 1 through 5 bilateral with elongation,  Neurologic: Positive paresthesias reported    Assessment:         1. Diabetic polyneuropathy associated with type 2 diabetes mellitus    2. Disease of nail    3. Type II diabetes mellitus with peripheral  circulatory disorder    4. Peripheral vascular disease       Plan:     Milly Lee was seen today for   Chief Complaint   Patient presents with    Nail Care       Assessment & Plan           Routine Foot Care    Date/Time: 10/15/2024 2:30 PM    Performed by: Alicia Clark DPM  Authorized by: Alicia Clark DPM    Consent Done?:  Yes (Verbal)  Hyperkeratotic Skin Lesions?: No      Nail Care Type:  Debride  Location(s): All  (Left 1st Toe, Left 3rd Toe, Left 2nd Toe, Left 4th Toe, Left 5th Toe, Right 1st Toe, Right 2nd Toe, Right 3rd Toe, Right 4th Toe and Right 5th Toe)  Patient tolerance:  Patient tolerated the procedure well with no immediate complications       1. Patient was examined and evaluated.    2. Patient was advised elevate lower extremity when at rest for mild swelling.  Patient will consider daily use of compression stockings and monitor dietary salt and water intake.    3. Patient was advised to continue with daily foot monitoring.  Patient will continue efforts proper glycemic control, lowering hemoglobin A1c, adherence to diabetic medication regimen  4. Patient made aware that minor foot pain maybe related to elongated nail plates.  5. Patient will follow-up in 3 months or p.r.n. for complaints      Alicia Clark DPM  [id number] [address] [address]  [phone]      This note was created using [employer] direct voice recognition software. Note may have occasional typographical errors that may not have been identified and edited despite initial review prior to signing.

## 2025-01-28 ENCOUNTER — OFFICE VISIT (OUTPATIENT)
Dept: PODIATRY | Facility: CLINIC | Age: 89
End: 2025-01-28
Payer: MEDICARE

## 2025-01-28 VITALS — HEIGHT: 60 IN | BODY MASS INDEX: 36.04 KG/M2

## 2025-01-28 DIAGNOSIS — L60.9 DISEASE OF NAIL: ICD-10-CM

## 2025-01-28 DIAGNOSIS — M67.471 GANGLION CYST OF BOTH ANKLES: Primary | ICD-10-CM

## 2025-01-28 DIAGNOSIS — I73.9 PERIPHERAL VASCULAR DISEASE: ICD-10-CM

## 2025-01-28 DIAGNOSIS — E11.42 DIABETIC POLYNEUROPATHY ASSOCIATED WITH TYPE 2 DIABETES MELLITUS: ICD-10-CM

## 2025-01-28 DIAGNOSIS — M67.472 GANGLION CYST OF BOTH ANKLES: Primary | ICD-10-CM

## 2025-01-28 DIAGNOSIS — E11.9 COMPREHENSIVE DIABETIC FOOT EXAMINATION, TYPE 2 DM, ENCOUNTER FOR: ICD-10-CM

## 2025-01-28 DIAGNOSIS — E11.51 TYPE II DIABETES MELLITUS WITH PERIPHERAL CIRCULATORY DISORDER: ICD-10-CM

## 2025-01-28 RX ORDER — NYSTATIN 100000 [USP'U]/ML
SUSPENSION ORAL
COMMUNITY
Start: 2024-12-09

## 2025-01-28 RX ORDER — MAGNESIUM HYDROXIDE 400 MG/5ML
1 SUSPENSION, ORAL (FINAL DOSE FORM) ORAL DAILY
COMMUNITY

## 2025-01-28 RX ORDER — TRIAMCINOLONE ACETONIDE 1 MG/G
CREAM TOPICAL
COMMUNITY

## 2025-01-28 NOTE — PROGRESS NOTES
Subjective:     Patient ID: Milly Lee is a 89 y.o. female    Chief Complaint: Nail Care       Milly is a 89 y.o. female who presents to the clinic for evaluation and treatment of high risk feet. Milly has a past medical history of Arthritis, Coronary artery disease, Diabetes mellitus, type 2, Diverticulitis, GERD (gastroesophageal reflux disease), Hyperlipidemia, and Hypertension. The patient's chief complaint is long, thick toenails. This patient has documented high risk feet requiring routine maintenance secondary to diabetes mellitis and those secondary complications of diabetes, as mentioned.  Patient has concerns about soft tissue mass at the bilateral ankle.  Patient states the areas nonpainful but today they are a little puffy or more swollen than they typically are.    PCP: Michela Christian MD    Date Last Seen by PCP: 01/03/2025    Current shoe gear:  Affected Foot: Casual shoes     Unaffected Foot: Casual shoes    Hemoglobin A1C   Date Value Ref Range Status   03/05/2024 6.2 4.5 - 6.2 % Final     Comment:     According to ADA guidelines, hemoglobin A1C <7.0% represents  optimal control in non-pregnant diabetic patients.  Different  metrics may apply to specific populations.   Standards of Medical Care in Diabetes - 2016.    For the purpose of screening for the presence of diabetes:  <5.7%     Consistent with the absence of diabetes  5.7-6.4%  Consistent with increasing risk for diabetes   (prediabetes)  >or=6.5%  Consistent with diabetes    Currently no consensus exists for use of hemoglobin A1C  for diagnosis of diabetes for children.         Review of Systems   Constitutional: Negative.  Negative for chills and fever.   Respiratory:  Negative for cough and shortness of breath.    Cardiovascular:  Positive for leg swelling. Negative for chest pain.   Gastrointestinal:  Negative for diarrhea, nausea and vomiting.   Neurological:  Positive for tingling.        Objective:   Physical  Exam  Vitals reviewed.   Constitutional:       General: She is not in acute distress.     Appearance: Normal appearance. She is not ill-appearing.   HENT:      Head: Normocephalic.      Nose: Nose normal.   Cardiovascular:      Pulses:           Dorsalis pedis pulses are 1+ on the right side and 1+ on the left side.        Posterior tibial pulses are 1+ on the right side and 1+ on the left side.   Pulmonary:      Effort: Pulmonary effort is normal. No respiratory distress.   Musculoskeletal:      Right foot: No bunion.      Left foot: No bunion.   Skin:     Capillary Refill: Capillary refill takes 2 to 3 seconds.   Neurological:      Mental Status: She is alert and oriented to person, place, and time.   Psychiatric:         Mood and Affect: Mood normal.         Behavior: Behavior normal.         Thought Content: Thought content normal.         Judgment: Judgment normal.        Foot Exam    General  General Appearance: appears stated age and healthy   Orientation: alert and oriented to person, place, and time   Affect: appropriate   Gait: antalgic   Assistance: wheelchair use       Right Foot/Ankle     Inspection and Palpation  Swelling: dorsum   Arch: normal  Hallux valgus: no  Skin Exam: abnormal color; no skin changes     Neurovascular  Dorsalis pedis: 1+  Posterior tibial: 1+    Muscle Strength  Ankle dorsiflexion: 4-  Ankle plantar flexion: 4-  Ankle inversion: 4-  Ankle eversion: 4-      Left Foot/Ankle      Inspection and Palpation  Swelling: dorsum   Arch: normal  Hallux valgus: no  Skin Exam: skin changes and abnormal color;     Neurovascular  Dorsalis pedis: 1+  Posterior tibial: 1+    Muscle Strength  Ankle dorsiflexion: 4-  Ankle plantar flexion: 4-  Ankle inversion: 4-  Ankle eversion: 4-      Vascular: +1 nonpitting edema noted bilateral ankle, pedal hair growth is absent bilateral lower extremity, soft tissue atrophic skin changes noted, skin temperature gradient warm to cool from proximal distal  bilateral lower extremity   Dermatologic:  Thickened and discolored nail plates 1 through 5 bilateral with elongation,  Neurologic: Positive paresthesias reported  Musculoskeletal: Presence of ganglionic cyst of the anterior lateral ankle gutter bilateral foot    Assessment:         1. Ganglion cyst of both ankles    2. Comprehensive diabetic foot examination, type 2 DM, encounter for    3. Diabetic polyneuropathy associated with type 2 diabetes mellitus    4. Disease of nail    5. Type II diabetes mellitus with peripheral circulatory disorder    6. Peripheral vascular disease       Plan:     Milly Lee was seen today for   Chief Complaint   Patient presents with    Nail Care       Assessment & Plan           Routine Foot Care    Date/Time: 1/28/2025 1:15 PM    Performed by: Alicia Clark DPM  Authorized by: Alicia Clark DPM    Consent Done?:  Yes (Verbal)  Hyperkeratotic Skin Lesions?: No      Nail Care Type:  Debride  Location(s): All  (Left 1st Toe, Left 3rd Toe, Left 2nd Toe, Left 4th Toe, Left 5th Toe, Right 1st Toe, Right 2nd Toe, Right 3rd Toe, Right 4th Toe and Right 5th Toe)  Patient tolerance:  Patient tolerated the procedure well with no immediate complications       1. Patient was examined and evaluated  2. Patient was advised to continue with daily foot monitoring.  Patient will continue efforts proper glycemic control, lowering hemoglobin A1c, adherence to diabetic medication regimen  3. Discussed with patient etiology of peripheral vascular disease.  Patient was advised elevate lower extremity rest consider daily use of compression stockings.  Patient will monitor dietary salt intake and water consumption.    4. Discussed with patient the etiology of nail fungus and as possible secondary issue to prior nail trauma.  Discussed with patient OTC topical conservative treatments such as Vicks vapor rub, tea tree oil as well as coconut oil.  Discussed with patient risks and benefits  oral Lamisil therapy.   5. Discussed with patient etiology of ganglionic cyst.  Patient made aware that it is a benign lesion.  Discussed with patient treatment options including aspiration of contents, local corticosteroid injection for improvement of inflammation, and surgical excision.  Patient was warned of potential recurrence over time.  Patient was advised to adjust shoe gear for better comfort and fit to prevent direct contact to lesion.  Patient was dispensed offloading pads for prevention of direct contact to lesion.  Patient was advised to adjunct with OTC analgesics stages NSAIDs for pain relief.     6.  Patient will follow-up in 3 months or p.r.n. for complaints       Risa Clark DPM  [id number] [place] [address]  [phone]      This note was created using [email or web address] direct voice recognition software. Note may have occasional typographical errors that may not have been identified and edited despite initial review prior to signing.